# Patient Record
Sex: FEMALE | Race: WHITE | NOT HISPANIC OR LATINO | Employment: FULL TIME | URBAN - METROPOLITAN AREA
[De-identification: names, ages, dates, MRNs, and addresses within clinical notes are randomized per-mention and may not be internally consistent; named-entity substitution may affect disease eponyms.]

---

## 2017-02-10 ENCOUNTER — OFFICE VISIT (OUTPATIENT)
Dept: INTERNAL MEDICINE | Facility: IMAGING CENTER | Age: 47
End: 2017-02-10
Payer: COMMERCIAL

## 2017-02-10 VITALS
BODY MASS INDEX: 34.84 KG/M2 | WEIGHT: 222 LBS | TEMPERATURE: 98.6 F | RESPIRATION RATE: 14 BRPM | HEIGHT: 67 IN | SYSTOLIC BLOOD PRESSURE: 120 MMHG | DIASTOLIC BLOOD PRESSURE: 90 MMHG | HEART RATE: 63 BPM | OXYGEN SATURATION: 95 %

## 2017-02-10 DIAGNOSIS — I10 ESSENTIAL HYPERTENSION: Primary | ICD-10-CM

## 2017-02-10 DIAGNOSIS — E66.9 OBESITY (BMI 30.0-34.9): ICD-10-CM

## 2017-02-10 DIAGNOSIS — E03.9 HYPOTHYROIDISM, UNSPECIFIED TYPE: ICD-10-CM

## 2017-02-10 DIAGNOSIS — F43.0 STRESS REACTION: ICD-10-CM

## 2017-02-10 PROCEDURE — 99214 OFFICE O/P EST MOD 30 MIN: CPT | Performed by: FAMILY MEDICINE

## 2017-02-10 RX ORDER — LEVOTHYROXINE SODIUM 50 MCG
TABLET ORAL
Qty: 180 TAB | Refills: 4 | Status: SHIPPED | OUTPATIENT
Start: 2017-02-10 | End: 2018-03-20 | Stop reason: SDUPTHER

## 2017-02-10 NOTE — MR AVS SNAPSHOT
"        Soraida Fregoso   2/10/2017 8:30 AM   Office Visit   MRN: [id number]    Department:  [facility]   Dept Phone:  [phone]    Description:  Female : 1970   Provider:  Jefry Chavarria M.D.           Reason for Visit     Hypertension           Allergies as of 2/10/2017     Allergen Noted Reactions    Sulfa Drugs 2007   Swelling    Amoxicillin 10/06/2010   Rash      You were diagnosed with     Hypothyroidism, unspecified type   [2297147]       Stress reaction   [211658]         Vital Signs     Blood Pressure Pulse Temperature Respirations Height Weight    120/90 mmHg 63 37 °C (98.6 °F) 14 1.702 m (5' 7.01\") 100.699 kg (222 lb)    Body Mass Index Oxygen Saturation Last Menstrual Period Smoking Status          34.76 kg/m2 95% 10/06/2010 Never Smoker         Basic Information     Date Of Birth Sex Race Ethnicity Preferred Language    1970 Female White Non- English      Your appointments     May 12, 2017 10:00 AM   Established Patient with Jefry Chavarria M.D.   [facility] (--)    [address] [place] [id number]   [phone]           You will be receiving a confirmation call a few days before your appointment from our automated call confirmation system.              Problem List              ICD-10-CM Priority Class Noted - Resolved    Dysthymic disorder F34.1   2012 - Present    Rheumatoid arthritis (CMS-[facility]) M06.9   2012 - Present    Asthma J45.909   2012 - Present    Dysfunction of eustachian tube H69.80   2012 - Present    Premature surgical menopause on HRT E89.40   2012 - Present    Routine general medical examination at a health care facility Z00.00   2012 - Present    Esophagitis K20.9   2012 - Present    Dry eyes H04.123   2012 - Present    GERD (gastroesophageal reflux disease) K21.9   Unknown - Present    Anemia D64.9   Unknown - Present    Polycystic ovaries E28.2   Unknown - Present   " Gluten intolerance K90.0   5/16/2014 - Present    Fatigue R53.83   5/16/2014 - Present    Mixed hyperlipidemia with apolipoprotein E4 variant E78.2   5/16/2014 - Present    Vitamin D deficiency disease E55.9   5/16/2014 - Present    Heterozygous MTHFR mutation Y8969R (CMS-HCC) E72.12   11/7/2014 - Present    Heterozygous MTHFR mutation C677T (CMS-HCC) E72.12   11/7/2014 - Present    Elevated homocysteine (CMS-HCC) E72.11   11/7/2014 - Present    Bladder disorder N32.9   11/7/2014 - Present    Near syncope R55   11/7/2014 - Present    Stress reaction F43.0   8/28/2015 - Present    Hypothyroidism E03.9   8/28/2015 - Present    Other allergic rhinitis J30.89   8/28/2015 - Present    Other specified hypothyroidism E03.8   4/13/2016 - Present    Family history of stroke Z82.3   4/13/2016 - Present    Obesity (BMI 35.0-39.9 without comorbidity) ([facility]) E66.9   11/11/2016 - Present    Essential hypertension I10   11/11/2016 - Present      Health Maintenance        Date Due Completion Dates    IMM PNEUMOCOCCAL 19-64 (ADULT) MEDIUM RISK SERIES (1 of 1 - PPSV23) 2/26/2009 1/1/2009    MAMMOGRAM 9/19/2017 9/19/2015, 3/15/2013 (Done), 7/27/2007, 7/27/2007    Override on 3/15/2013: Done    PAP SMEAR 10/2/2018 10/2/2015, 8/7/2014 (Done), 5/1/2013 (Prv Comp), 11/1/2012 (Done)    Override on 8/7/2014: Done    Override on 5/1/2013: Previously completed    Override on 11/1/2012: Done (Dr. Tracy)    IMM DTaP/Tdap/Td Vaccine (2 - Td) 3/8/2023 3/8/2013            Current Immunizations     13-VALENT PCV PREVNAR 1/1/2009    Influenza TIV (IM) 10/1/2013    Influenza Vaccine Quad Inj (Pf) 10/15/2016    Tdap Vaccine 3/8/2013    Tuberculin Skin Test 11/30/2011  4:31 PM, 12/15/2010, 12/8/2010      Below and/or attached are the medications your provider expects you to take. Review all of your home medications and newly ordered medications with your provider and/or pharmacist. Follow medication instructions as directed by your provider and/or  pharmacist. Please keep your medication list with you and share with your provider. Update the information when medications are discontinued, doses are changed, or new medications (including over-the-counter products) are added; and carry medication information at all times in the event of emergency situations     Allergies:  SULFA DRUGS - Swelling     AMOXICILLIN - Rash               Medications  Valid as of: February 10, 2017 - 10:03 AM    Generic Name Brand Name Tablet Size Instructions for use    Albuterol Sulfate (Aero Soln) albuterol 108 (90 BASE) MCG/ACT Inhale 2 Puffs by mouth every 6 hours as needed for Shortness of Breath.        Beclomethasone Dipropionate (Aero Soln) QVAR 80 MCG/ACT Inhale 1 Puff by mouth every day.        Cholecalciferol (Cap) Vitamin D 2000 UNITS Take 1 Cap by mouth every day.        CycloSPORINE (Emulsion) RESTASIS 0.05 % Place 1 Drop in both eyes 2 times a day.        Escitalopram Oxalate (Tab) LEXAPRO 20 MG TAKE 1 TAB BY MOUTH EVERY DAY.        Estradiol (Solution) Estradiol 1.53 MG/SPRAY Apply 1-3 Sprays to skin as directed every day.        Evening Primrose Oil (Cap) Evening Primrose Oil 500 MG Take 1 Cap by mouth 2 Times a Day.        Fish Oil (Oil) Fish Oil  2 Each by Does not apply route every day. Nature Made Fish Oil [place] 3s EPA and DHA. 227 mg Fish Oil, Omega 3 57 mg, Omega 3 DHA 47.5 mg, Omega 3 EPA 9.5 mg        L-Methylfolate (Tab) L-Methylfolate 1 MG Take 1 Tab by mouth every day. Solgar - Metafolin        Leflunomide (Tab) ARAVA 20 MG Take 1 Tab by mouth every day.        Levalbuterol HCl (Nebu Soln) XOPENEX 0.63 MG/3ML 0.63 mg by Nebulization route every 8 hours as needed. Indications: Spasm of Lung Air Passages        Levothyroxine Sodium (Tab) SYNTHROID 50 MCG TAKE 2 TABS BY MOUTH EVERY DAY. BRAND NAME ONLY!!        Mometasone Furoate (Suspension) NASONEX 50 MCG/ACT Spray 2 Sprays in nose every day. Each nostril        Multiple Vitamin (Tab) THERAGRAN  Take 1 Tab by  mouth every day.        NON SPECIFIED   Take 1 Each by mouth every day. Vitafusion Calcium 500mg Gummy Vitamins        Ramipril (Cap) ALTACE 5 MG Take 1 Cap by mouth every day.        Scopolamine Base (PATCH 72 HR) TRANSDERM-SCOP 1.5 MG/3DAYS Apply 1 Patch to skin as directed every 72 hours.        Testosterone (Gel) TESTIM,ANDROGEL 50 MG/5GM (1%) Apply 5 g to skin as directed every day.        .                 Medicines prescribed today were sent to:     [id number] - [place] - [address] [place] [address]    Phone: [phone] Fax: [phone]    Open 24 Hours?: No      Medication refill instructions:       If your prescription bottle indicates you have medication refills left, it is not necessary to call your provider’s office. Please contact your pharmacy and they will refill your medication.    If your prescription bottle indicates you do not have any refills left, you may request refills at any time through one of the following ways: The online [employer] system (except Urgent Care), by calling your provider’s office, or by asking your pharmacy to contact your provider’s office with a refill request. Medication refills are processed only during regular business hours and may not be available until the next business day. Your provider may request additional information or to have a follow-up visit with you prior to refilling your medication.   *Please Note: Medication refills are assigned a new Rx number when refilled electronically. Your pharmacy may indicate that no refills were authorized even though a new prescription for the same medication is available at the pharmacy. Please request the medicine by name with the pharmacy before contacting your provider for a refill.        Other Notes About Your Plan     Colonoscopy not indicated  Dr. Harris GYN  Rheumatology-[facility]   Pulmonary-[facility] Surgery-[facility]    [facility] insurance - Live[employer] Access Code:  Activation code not generated  Current MyChart Status: Active

## 2017-02-10 NOTE — PROGRESS NOTES
SUBJECTIVE:    Chief Complaint   Patient presents with   • Hypertension       Soraida Fregoso is a 46 y.o. female,   Established Patient     PROBLEM #1-HISTORY OF PRESENT ILLNESS  Existing Problem, but requiring re-evaluation  PATIENT STATEMENT OF PROBLEM - ess. HTN  ONSET - years  COURSE - variable readings. 120/90 today. Asymptomatic.   INTENSITY/STATUS/LOCATION/RADIATION - variable/ on rx  AGGRAVATORS - Multifactorial including inadequate Therapeutic Lifestyle Changes, work stress?  RELIEVERS - meds?   TREATMENTS/COMPLIANCE/@GOAL? - same/ inadequate Therapeutic Lifestyle Changes / no     Allergies   Allergen Reactions   • Sulfa Drugs Swelling   • Amoxicillin Rash       Patient Active Problem List    Diagnosis Date Noted   • Obesity (BMI 35.0-39.9 without comorbidity) ([facility]) 11/11/2016   • Essential hypertension 11/11/2016   • Other specified hypothyroidism 04/13/2016   • Family history of stroke 04/13/2016   • Stress reaction 08/28/2015   • Hypothyroidism 08/28/2015   • Other allergic rhinitis 08/28/2015   • Heterozygous MTHFR mutation V4547A (CMS-HCC) 11/07/2014   • Heterozygous MTHFR mutation C677T (CMS-HCC) 11/07/2014   • Elevated homocysteine (CMS-HCC) 11/07/2014   • Bladder disorder 11/07/2014   • Near syncope 11/07/2014   • Gluten intolerance 05/16/2014   • Fatigue 05/16/2014   • Mixed hyperlipidemia with apolipoprotein E4 variant 05/16/2014   • Vitamin D deficiency disease 05/16/2014   • GERD (gastroesophageal reflux disease)    • Anemia    • Polycystic ovaries    • Dysthymic disorder 11/07/2012   • Rheumatoid arthritis (CMS-HCC) 11/07/2012   • Asthma 11/07/2012   • Dysfunction of eustachian tube 11/07/2012   • Premature surgical menopause on HRT 11/07/2012   • Routine general medical examination at a health care facility 11/07/2012   • Esophagitis 11/07/2012   • Dry eyes 11/07/2012       Outpatient Encounter Prescriptions as of 2/10/2017   Medication Sig Dispense Refill   • SYNTHROID 50 MCG Tab TAKE 2  TABS BY MOUTH EVERY DAY. BRAND NAME ONLY!! 180 Tab 4   • escitalopram (LEXAPRO) 20 MG tablet TAKE 1 TAB BY MOUTH EVERY DAY. 90 Tab 3   • ramipril (ALTACE) 5 MG Cap Take 1 Cap by mouth every day. 90 Cap 4   • Evening Primrose Oil 500 MG Cap Take 1 Cap by mouth 2 Times a Day.     • Estradiol (EVAMIST) 1.53 MG/SPRAY SOLN Apply 1-3 Sprays to skin as directed every day. 1 Bottle 0   • albuterol (VENTOLIN OR PROVENTIL) 108 (90 BASE) MCG/ACT AERS inhalation aerosol Inhale 2 Puffs by mouth every 6 hours as needed for Shortness of Breath. 8.5 g 12   • L-Methylfolate 1 MG TABS Take 1 Tab by mouth every day. Solgar - Metafolin     • leflunomide (ARAVA) 20 MG TABS Take 1 Tab by mouth every day. 90 Tab 3   • testosterone (TESTIM,ANDROGEL) 50 MG/5GM GEL gel Apply 5 g to skin as directed every day.     • Cholecalciferol (VITAMIN D) 2000 UNITS CAPS Take 1 Cap by mouth every day. 30 Cap    • NON SPECIFIED Take 1 Each by mouth every day. Vitafusion Calcium 500mg Gummy Vitamins     • Fish Oil OIL 2 Each by Does not apply route every day. Nature Made Fish Oil [place] 3s EPA and DHA. 227 mg Fish Oil, Omega 3 57 mg, Omega 3 DHA 47.5 mg, Omega 3 EPA 9.5 mg     • levalbuterol (XOPENEX) 0.63 MG/3ML NEBU 0.63 mg by Nebulization route every 8 hours as needed. Indications: Spasm of Lung Air Passages     • multivitamin (THERAGRAN) per tablet Take 1 Tab by mouth every day.     • cyclosporin (RESTASIS) 0.05 % ophthalmic emulsion Place 1 Drop in both eyes 2 times a day. 1 Each 3   • beclomethasone (QVAR) 80 MCG/ACT inhaler Inhale 1 Puff by mouth every day.     • mometasone (NASONEX) 50 MCG/ACT nasal spray Spray 2 Sprays in nose every day. Each nostril 3 Inhaler 4   • scopolamine (TRANSDERM-SCOP) 1.5 MG/3DAYS PATCH 72 HR Apply 1 Patch to skin as directed every 72 hours. 4 Patch 3   • [DISCONTINUED] SYNTHROID 50 MCG Tab TAKE 2 TABS BY MOUTH EVERY DAY. BRAND NAME ONLY!! 180 Tab 4   • [DISCONTINUED] alprazolam (XANAX) 0.25 MG TABS Take 0.5 Tabs by mouth  "3 times a day as needed. 45 Each 1     No facility-administered encounter medications on file as of 2/10/2017.       Social History   Substance Use Topics   • Smoking status: Never Smoker    • Smokeless tobacco: Never Used   • Alcohol Use: No      Comment: twice ayear       Family History   Problem Relation Age of Onset   • Stroke Father 41   • Heart Disease Father      \"hole\" in heart causing stroke   • Alcohol/Drug Brother    • Arthritis Sister      rheumatoid-severe       Patient's Past, Social, and Family History reviewed and updated by me in EPIC today.    REVIEW OF SYMPTOMS:               Pertinent Positives as above.    All other systems reviewed and negative.     OBJECTIVE:    /90 mmHg  Pulse 63  Temp(Src) 37 °C (98.6 °F)  Resp 14  Ht 1.702 m (5' 7.01\")  Wt 100.699 kg (222 lb)  BMI 34.76 kg/m2  SpO2 95%  LMP 10/06/2010  Body mass index is 34.76 kg/(m^2).    Well developed, well nourished female, no acute distress, non-ill appearing. Comfortable, appears stated age, pleasant and cooperative  HEAD: atraumatic, normocephalic   EYES: Conjunctiva normal, EOMI, PERRLA, acuity grossly intact.   EARS/NOSE/THROAT: TM's normal, no SSX of infection, no perforation, no hemotympanum, acuity grossly intact. Oropharynx: benign, no lesions noted. Nares: benign.   NECK: supple, no adenopathy, no thyromegaly or nodules, no JVD, no carotid bruits.   CHEST/LUNGS: clear to auscultation and percussion bilaterally. No adventitious breath sounds.   CARDIOVASCULAR: regular rate and rhythm, no murmur. PMI not displaced. Good central and peripheral pulses.   BACK: no CVA tenderness.   ABDOMEN: soft, non-tender, non-distended, no masses, no hepatosplenomegaly. Normal active bowel tones.   : deferred.   Rectal: deferred.   Extremities: warm/well-perfused, no cyanosis, clubbing, or edema.   SKIN: clear, unbroken, no rashes, normal turgor.   Neuro: Mental Status: Alert and Oriented x 3. CN II-XII grossly intact. Gait " normal. Non-focal, intact. Normal strength, sensation    ASSESSMENT:    1. Essential hypertension     2. Stress reaction     3. Obesity (BMI 30.0-34.9)     4. Hypothyroidism, unspecified type  SYNTHROID 50 MCG Tab       PLAN:    Total Face-to-Face time spent with patient: 30 minutes  Amount of time spent counseling patient and/or coordinating care: 20 minutes    The nature of patient counseling as below:  -Patient Education, including below topics:  -Differential Diagnoses and treatment options discussed  -Risks, benefits, alternatives discussed  -Labs reviewed with patient in detail  -Health Maintenance Exam issues discussed  -Exercise  -Dietary recommendations discussed  -Weight Loss strategies discussed  -Stress Management  -Therapeutic Lifestyle Changes discussed    The nature of coordination of care as below:  -Medications added: Multiple Refills  -Medications discontinued: none. Med list updated.   -Medications adjusted: none  -Continue (other) present chronic medications  -Blood Pressure Diary  -I asked her to bring home BP monitor in to clinic to assess accuracy  -Seek medical attention immediately if worse    FOLLOW-UP:  -in 3 months  -and sooner if test/consult results warrant  And for Health Care Maintenance Exams  And as needed.

## 2017-02-15 NOTE — PATIENT INSTRUCTIONS
Current Outpatient Prescriptions Ordered in [facility]   Medication Sig Dispense Refill   • SYNTHROID 50 MCG Tab TAKE 2 TABS BY MOUTH EVERY DAY. BRAND NAME ONLY!! 180 Tab 4   • escitalopram (LEXAPRO) 20 MG tablet TAKE 1 TAB BY MOUTH EVERY DAY. 90 Tab 3   • ramipril (ALTACE) 5 MG Cap Take 1 Cap by mouth every day. 90 Cap 4   • Evening Primrose Oil 500 MG Cap Take 1 Cap by mouth 2 Times a Day.     • Estradiol (EVAMIST) 1.53 MG/SPRAY SOLN Apply 1-3 Sprays to skin as directed every day. 1 Bottle 0   • albuterol (VENTOLIN OR PROVENTIL) 108 (90 BASE) MCG/ACT AERS inhalation aerosol Inhale 2 Puffs by mouth every 6 hours as needed for Shortness of Breath. 8.5 g 12   • L-Methylfolate 1 MG TABS Take 1 Tab by mouth every day. Solgar - Metafolin     • leflunomide (ARAVA) 20 MG TABS Take 1 Tab by mouth every day. 90 Tab 3   • testosterone (TESTIM,ANDROGEL) 50 MG/5GM GEL gel Apply 5 g to skin as directed every day.     • Cholecalciferol (VITAMIN D) 2000 UNITS CAPS Take 1 Cap by mouth every day. 30 Cap    • NON SPECIFIED Take 1 Each by mouth every day. Vitafusion Calcium 500mg Gummy Vitamins     • Fish Oil OIL 2 Each by Does not apply route every day. Nature Made Fish Oil [place] 3s EPA and DHA. 227 mg Fish Oil, Omega 3 57 mg, Omega 3 DHA 47.5 mg, Omega 3 EPA 9.5 mg     • levalbuterol (XOPENEX) 0.63 MG/3ML NEBU 0.63 mg by Nebulization route every 8 hours as needed. Indications: Spasm of Lung Air Passages     • multivitamin (THERAGRAN) per tablet Take 1 Tab by mouth every day.     • cyclosporin (RESTASIS) 0.05 % ophthalmic emulsion Place 1 Drop in both eyes 2 times a day. 1 Each 3   • beclomethasone (QVAR) 80 MCG/ACT inhaler Inhale 1 Puff by mouth every day.     • mometasone (NASONEX) 50 MCG/ACT nasal spray Spray 2 Sprays in nose every day. Each nostril 3 Inhaler 4   • scopolamine (TRANSDERM-SCOP) 1.5 MG/3DAYS PATCH 72 HR Apply 1 Patch to skin as directed every 72 hours. 4 Patch 3     No current Epic-ordered facility-administered  medications on file.

## 2017-05-12 ENCOUNTER — HOSPITAL ENCOUNTER (OUTPATIENT)
Facility: MEDICAL CENTER | Age: 47
End: 2017-05-12
Attending: FAMILY MEDICINE
Payer: COMMERCIAL

## 2017-05-12 ENCOUNTER — OFFICE VISIT (OUTPATIENT)
Dept: INTERNAL MEDICINE | Facility: IMAGING CENTER | Age: 47
End: 2017-05-12
Payer: COMMERCIAL

## 2017-05-12 VITALS
DIASTOLIC BLOOD PRESSURE: 76 MMHG | WEIGHT: 222 LBS | SYSTOLIC BLOOD PRESSURE: 124 MMHG | RESPIRATION RATE: 14 BRPM | BODY MASS INDEX: 34.84 KG/M2 | OXYGEN SATURATION: 93 % | HEIGHT: 67 IN | TEMPERATURE: 98.8 F | HEART RATE: 78 BPM

## 2017-05-12 DIAGNOSIS — E66.9 OBESITY (BMI 35.0-39.9 WITHOUT COMORBIDITY): ICD-10-CM

## 2017-05-12 DIAGNOSIS — E55.9 VITAMIN D DEFICIENCY DISEASE: ICD-10-CM

## 2017-05-12 DIAGNOSIS — M06.00 RHEUMATOID ARTHRITIS WITH NEGATIVE RHEUMATOID FACTOR, INVOLVING UNSPECIFIED SITE (HCC): ICD-10-CM

## 2017-05-12 DIAGNOSIS — I10 ESSENTIAL HYPERTENSION: ICD-10-CM

## 2017-05-12 DIAGNOSIS — E03.9 HYPOTHYROIDISM, UNSPECIFIED TYPE: ICD-10-CM

## 2017-05-12 DIAGNOSIS — J45.20 MILD INTERMITTENT ASTHMA WITHOUT COMPLICATION: ICD-10-CM

## 2017-05-12 DIAGNOSIS — L60.8 TOENAIL DEFORMITY: ICD-10-CM

## 2017-05-12 LAB — TSH SERPL DL<=0.005 MIU/L-ACNC: 0.41 UIU/ML (ref 0.3–3.7)

## 2017-05-12 PROCEDURE — 84443 ASSAY THYROID STIM HORMONE: CPT

## 2017-05-12 PROCEDURE — 99214 OFFICE O/P EST MOD 30 MIN: CPT | Performed by: FAMILY MEDICINE

## 2017-05-12 NOTE — MR AVS SNAPSHOT
"        Soraida Fregoso   2017 3:00 PM   Office Visit   MRN: [id number]    Department:  [facility]   Dept Phone:  [phone]    Description:  Female : 1970   Provider:  Sandie Cuba M.D.           Reason for Visit     Hypertension discontinued Ramipril 5mg 4 days ago due to side effects      Allergies as of 2017     Allergen Noted Reactions    Sulfa Drugs 2007   Swelling    Amoxicillin 10/06/2010   Rash      You were diagnosed with     Hypothyroidism, unspecified type   [3040212]         Vital Signs     Blood Pressure Pulse Temperature Respirations Height Weight    124/76 mmHg 78 37.1 °C (98.8 °F) 14 1.702 m (5' 7.01\") 100.699 kg (222 lb)    Body Mass Index Oxygen Saturation Last Menstrual Period Smoking Status          34.76 kg/m2 93% 10/06/2010 Never Smoker         Basic Information     Date Of Birth Sex Race Ethnicity Preferred Language    1970 Female White Non- English      Your appointments     Nov 10, 2017  3:00 PM   Established Patient with Sandie Cuba M.D.   [facility] (--)    [address] [place] [id number] [phone]           You will be receiving a confirmation call a few days before your appointment from our automated call confirmation system.              Problem List              ICD-10-CM Priority Class Noted - Resolved    Dysthymic disorder F34.1   2012 - Present    Rheumatoid arthritis (CMS-[facility]) M06.9   2012 - Present    Asthma J45.909   2012 - Present    Dysfunction of eustachian tube H69.80   2012 - Present    Premature surgical menopause on HRT E89.40   2012 - Present    Routine general medical examination at a health care facility Z00.00   2012 - Present    Esophagitis K20.9   2012 - Present    Dry eyes H04.123   2012 - Present    GERD (gastroesophageal reflux disease) K21.9   Unknown - Present    Anemia D64.9   Unknown - Present    Polycystic " ovaries E28.2   Unknown - Present    Gluten intolerance K90.0   5/16/2014 - Present    Fatigue R53.83   5/16/2014 - Present    Mixed hyperlipidemia with apolipoprotein E4 variant E78.2   5/16/2014 - Present    Vitamin D deficiency disease E55.9   5/16/2014 - Present    Heterozygous MTHFR mutation L4600D (CMS-HCC) E72.12   11/7/2014 - Present    Heterozygous MTHFR mutation C677T (CMS-HCC) E72.12   11/7/2014 - Present    Elevated homocysteine (CMS-HCC) E72.11   11/7/2014 - Present    Bladder disorder N32.9   11/7/2014 - Present    Near syncope R55   11/7/2014 - Present    Stress reaction F43.0   8/28/2015 - Present    Hypothyroidism E03.9   8/28/2015 - Present    Other allergic rhinitis J30.89   8/28/2015 - Present    Other specified hypothyroidism E03.8   4/13/2016 - Present    Family history of stroke Z82.3   4/13/2016 - Present    Obesity (BMI 35.0-39.9 without comorbidity) ([facility]) E66.9   11/11/2016 - Present    Essential hypertension I10   11/11/2016 - Present      Health Maintenance        Date Due Completion Dates    IMM PNEUMOCOCCAL 19-64 (ADULT) MEDIUM RISK SERIES (1 of 1 - PPSV23) 2/26/2009 1/1/2009    MAMMOGRAM 9/19/2017 9/19/2015, 3/15/2013 (Done), 7/27/2007, 7/27/2007    Override on 3/15/2013: Done    PAP SMEAR 10/2/2018 10/2/2015, 8/7/2014 (Done), 5/1/2013 (Prv Comp), 11/1/2012 (Done)    Override on 8/7/2014: Done    Override on 5/1/2013: Previously completed    Override on 11/1/2012: Done (Dr. Tracy)    IMM DTaP/Tdap/Td Vaccine (2 - Td) 3/8/2023 3/8/2013            Current Immunizations     13-VALENT PCV PREVNAR 1/1/2009    Influenza TIV (IM) 10/1/2013    Influenza Vaccine Quad Inj (Pf) 10/15/2016    Tdap Vaccine 3/8/2013    Tuberculin Skin Test 11/30/2011  4:31 PM, 12/15/2010, 12/8/2010      Below and/or attached are the medications your provider expects you to take. Review all of your home medications and newly ordered medications with your provider and/or pharmacist. Follow medication instructions as  directed by your provider and/or pharmacist. Please keep your medication list with you and share with your provider. Update the information when medications are discontinued, doses are changed, or new medications (including over-the-counter products) are added; and carry medication information at all times in the event of emergency situations     Allergies:  SULFA DRUGS - Swelling     AMOXICILLIN - Rash               Medications  Valid as of: May 12, 2017 -  4:06 PM    Generic Name Brand Name Tablet Size Instructions for use    Albuterol Sulfate (Aero Soln) albuterol 108 (90 BASE) MCG/ACT Inhale 2 Puffs by mouth every 6 hours as needed for Shortness of Breath.        Beclomethasone Dipropionate (Aero Soln) QVAR 80 MCG/ACT Inhale 1 Puff by mouth every day.        Cholecalciferol (Cap) Vitamin D 2000 UNITS Take 1 Cap by mouth every day.        CycloSPORINE (Emulsion) RESTASIS 0.05 % Place 1 Drop in both eyes 2 times a day.        Escitalopram Oxalate (Tab) LEXAPRO 20 MG TAKE 1 TAB BY MOUTH EVERY DAY.        Estradiol (Solution) Estradiol 1.53 MG/SPRAY Apply 1-3 Sprays to skin as directed every day.        Evening Primrose Oil (Cap) Evening Primrose Oil 500 MG Take 1 Cap by mouth 2 Times a Day.        Fish Oil (Oil) Fish Oil  2 Each by Does not apply route every day. Nature Made Fish Oil [place] 3s EPA and DHA. 227 mg Fish Oil, Omega 3 57 mg, Omega 3 DHA 47.5 mg, Omega 3 EPA 9.5 mg        L-Methylfolate (Tab) L-Methylfolate 1 MG Take 1 Tab by mouth every day. Solgar - Metafolin        Leflunomide (Tab) ARAVA 20 MG Take 1 Tab by mouth every day.        Levalbuterol HCl (Nebu Soln) XOPENEX 0.63 MG/3ML 0.63 mg by Nebulization route every 8 hours as needed. Indications: Spasm of Lung Air Passages        Levothyroxine Sodium (Tab) SYNTHROID 50 MCG TAKE 2 TABS BY MOUTH EVERY DAY. BRAND NAME ONLY!!        Mometasone Furoate (Suspension) NASONEX 50 MCG/ACT Spray 2 Sprays in nose every day. Each nostril        Multiple Vitamin  (Tab) THERAGRAN  Take 1 Tab by mouth every day.        NON SPECIFIED   Take 1 Each by mouth every day. Vitafusion Calcium 500mg Gummy Vitamins        Scopolamine Base (PATCH 72 HR) TRANSDERM-SCOP 1.5 MG/3DAYS Apply 1 Patch to skin as directed every 72 hours.        .                 Medicines prescribed today were sent to:     [facility]/PHARMACY #[id number] - [place] - [address] [place] [address]    Phone: [phone] Fax: [phone]    Open 24 Hours?: No      Medication refill instructions:       If your prescription bottle indicates you have medication refills left, it is not necessary to call your provider’s office. Please contact your pharmacy and they will refill your medication.    If your prescription bottle indicates you do not have any refills left, you may request refills at any time through one of the following ways: The online [employer] system (except Urgent Care), by calling your provider’s office, or by asking your pharmacy to contact your provider’s office with a refill request. Medication refills are processed only during regular business hours and may not be available until the next business day. Your provider may request additional information or to have a follow-up visit with you prior to refilling your medication.   *Please Note: Medication refills are assigned a new Rx number when refilled electronically. Your pharmacy may indicate that no refills were authorized even though a new prescription for the same medication is available at the pharmacy. Please request the medicine by name with the pharmacy before contacting your provider for a refill.        Your To Do List     Future Labs/Procedures Complete By Expires    TSH WITH REFLEX TO FT4  As directed 5/12/2018      Other Notes About Your Plan     Dr. Harris GYN  Rheumatology-Calixto; Pulmonary-Jen; Surgery-Carlos; Opth-Baltazar             [email or web address] Access Code: Activation code not generated  Current [employer] Status: Active

## 2017-05-12 NOTE — PROGRESS NOTES
"Chief Complaint   Patient presents with   • Hypertension     discontinued Ramipril 5mg 4 days ago due to side effects       HPI:  Patient is a 47 y.o. female established patient who presents today to transfer care from Dr. Chavarria to myself and discuss multiple issues. Pt had an extremely stressful and malignant job in the past which had caused many health and stress issues. During that time, she was diagnosed with HTN and placed on ramipril. She has reduced her stress considerably since changing jobs within the last year and has been monitoring her BP at home. She has noticed low BP with feelings of being \"unbalanced\" and incurred headaches. Pt stopped medication four days ago on her own and has not had any symptoms since that time. She has chronic RA followed by Dr. De Luna and had recent labs done which we will request. She has chronic hypothyroidism and is well controlled on Synthroid (brand name) only and is due for her 6 month thyroid panel at our visit. She has chronic obesity and has struggles with weight loss. She has started an exercise program last week and is committed to improving her diet - target weight 170lb. She is also concerned about new b/l great toe toenail growth problems that she would like me to evaluate. She has her yearly gyn appointment with Dr. Harris and UTD with her pap/pelvic exam. She is in good spirits today and reports 100% medication compliance.     Patient Active Problem List    Diagnosis Date Noted   • Obesity (BMI 35.0-39.9 without comorbidity) ([facility]) 11/11/2016   • Essential hypertension 11/11/2016   • Family history of stroke 04/13/2016   • Stress reaction 08/28/2015   • Hypothyroidism 08/28/2015   • Other allergic rhinitis 08/28/2015   • Heterozygous MTHFR mutation U8083P (CMS-HCC) 11/07/2014   • Heterozygous MTHFR mutation C677T (CMS-HCC) 11/07/2014   • Elevated homocysteine (CMS-HCC) 11/07/2014   • Bladder disorder 11/07/2014   • Gluten intolerance 05/16/2014   • Fatigue " "05/16/2014   • Mixed hyperlipidemia with apolipoprotein E4 variant 05/16/2014   • Vitamin D deficiency disease 05/16/2014   • GERD (gastroesophageal reflux disease)    • Anemia    • Polycystic ovaries    • Dysthymic disorder 11/07/2012   • Rheumatoid arthritis with negative rheumatoid factor (CMS-[facility]) 11/07/2012   • Asthma 11/07/2012   • Premature surgical menopause on HRT 11/07/2012   • Routine general medical examination at a health care facility 11/07/2012   • Dry eyes 11/07/2012     Past medical, surgical, family, and social history was reviewed and updated in Epic chart by me today.     Medications and allergies reviewed and updated in Epic chart by me today.     ROS:  Pertinent positives listed above in HPI. All other systems have been reviewed and are negative.    PE:   /76 mmHg  Pulse 78  Temp(Src) 37.1 °C (98.8 °F)  Resp 14  Ht 1.702 m (5' 7.01\")  Wt 100.699 kg (222 lb)  BMI 34.76 kg/m2  SpO2 93%  LMP 10/06/2010  Vital signs reviewed with patient.     Gen: Well developed; well nourished; no acute distress; age appropriate appearance   HEENT: Normocephalic; atraumatic; PEERLA b/l; sclera clear b/l; b/l external auditory canals WNL; b/l TM WNL; oropharynx clear; oral mucosa moist; tongue midline; dentition adequate   Neck: No adenopathy; no thyromegaly  CV: Regular rate and rhythm; S1/ S2 present; no murmur, gallop or rub noted  Pulm: No respiratory distress; clear to ascultation b/l; no wheezing or stridor noted b/l  Abd: Adequate bowel sounds noted; soft and nontender; no rebound, rigidity, nor distention; obese   Extremities: No peripheral edema b/l LE extremities/ no clubbing nor cyanosis noted; b/l pedal pulses present; all toenails have gel polish on them so I cannot visualize natural nail color and texture. Both great toenails have curved stunted growth with significant debris noted underneath - no odor or secondary infection noted; mild inward growth of great toes noted with lateral " calluses.   Skin: Warm and dry; no rashes noted   Neuro: No focal deficits noted   Psych: AAOx4; mood and affect are appropriate    A/P:  1. Chronic hypothyroidism, unspecified type  Stable/ pt due for thyroid panel at visit today. Pt to continue taking daily synthroid.  - TSH WITH REFLEX TO FT4; Future    2. Chronic mild intermittent asthma without complication  Stable/ no recent exacerbation/ well controlled with current medications.     3. Chronic vitamin D deficiency   Stable/ pt to continue taking daily vitamin D3 replacement.     4. Obesity (BMI 35.0-39.9 without comorbidity) ([facility])  Pt has started dedicated exercise program and is focusing on her diet - target weight 170 lbs. Encouragement provided.  - Patient identified as having weight management issue.  Appropriate orders and counseling given.    5. Chronic essential hypertension  Markedly improved with lifestyle changes. Pt remains off ramipril and BP well controlled. Pt will maintain home BP log and contact me if her SBP trends >140.     6. Chronic rheumatoid arthritis with negative rheumatoid factor, involving unspecified site (CMS-[facility])  Stable on current treatment plan with no recent flair. Will request most recent lab result and note from Dr De Luna. Pt to continue yearly f/u exam with Dr. De Luna.     7. Toenail deformity  High suspicion for toenail fungus. Given her RA history and mild great toe deformities in addition to nail issues, will refer to podiatry for formal evaluation and treatment planning.   - REFERRAL TO PODIATRY     I will f/u with pt directly when lab results available. She is to see me in 6 months/ PRN sooner if current condition changes.

## 2017-05-17 PROBLEM — M05.79 RHEUMATOID ARTHRITIS INVOLVING MULTIPLE SITES WITH POSITIVE RHEUMATOID FACTOR (HCC): Chronic | Status: ACTIVE | Noted: 2017-05-17

## 2017-05-19 DIAGNOSIS — J45.30 ASTHMA IN ADULT, MILD PERSISTENT, UNCOMPLICATED: ICD-10-CM

## 2017-05-19 RX ORDER — ALBUTEROL SULFATE 90 UG/1
2 AEROSOL, METERED RESPIRATORY (INHALATION) EVERY 6 HOURS PRN
Qty: 8.5 G | Refills: 12 | Status: SHIPPED | OUTPATIENT
Start: 2017-05-19 | End: 2022-06-28 | Stop reason: SDUPTHER

## 2017-07-14 ENCOUNTER — OFFICE VISIT (OUTPATIENT)
Dept: INTERNAL MEDICINE | Facility: IMAGING CENTER | Age: 47
End: 2017-07-14
Payer: COMMERCIAL

## 2017-07-14 ENCOUNTER — HOSPITAL ENCOUNTER (OUTPATIENT)
Facility: MEDICAL CENTER | Age: 47
End: 2017-07-14
Attending: FAMILY MEDICINE
Payer: COMMERCIAL

## 2017-07-14 VITALS
WEIGHT: 226 LBS | HEIGHT: 67 IN | BODY MASS INDEX: 35.47 KG/M2 | SYSTOLIC BLOOD PRESSURE: 140 MMHG | RESPIRATION RATE: 14 BRPM | TEMPERATURE: 98.4 F | OXYGEN SATURATION: 97 % | DIASTOLIC BLOOD PRESSURE: 90 MMHG | HEART RATE: 65 BPM

## 2017-07-14 DIAGNOSIS — R60.0 EDEMA EXTREMITIES: ICD-10-CM

## 2017-07-14 DIAGNOSIS — E78.2 MIXED HYPERLIPIDEMIA WITH APOLIPOPROTEIN E4 VARIANT: Chronic | ICD-10-CM

## 2017-07-14 DIAGNOSIS — Z00.00 HEALTH CARE MAINTENANCE: ICD-10-CM

## 2017-07-14 DIAGNOSIS — R53.83 FATIGUE, UNSPECIFIED TYPE: ICD-10-CM

## 2017-07-14 DIAGNOSIS — E55.9 VITAMIN D DEFICIENCY DISEASE: ICD-10-CM

## 2017-07-14 DIAGNOSIS — I10 ESSENTIAL HYPERTENSION: Chronic | ICD-10-CM

## 2017-07-14 LAB
25(OH)D3 SERPL-MCNC: 30 NG/ML (ref 30–100)
ALBUMIN SERPL BCP-MCNC: 4.2 G/DL (ref 3.2–4.9)
ALBUMIN/GLOB SERPL: 1.5 G/DL
ALP SERPL-CCNC: 77 U/L (ref 30–99)
ALT SERPL-CCNC: 29 U/L (ref 2–50)
ANION GAP SERPL CALC-SCNC: 6 MMOL/L (ref 0–11.9)
AST SERPL-CCNC: 29 U/L (ref 12–45)
BASOPHILS # BLD AUTO: 0.7 % (ref 0–1.8)
BASOPHILS # BLD: 0.03 K/UL (ref 0–0.12)
BILIRUB SERPL-MCNC: 0.5 MG/DL (ref 0.1–1.5)
BUN SERPL-MCNC: 11 MG/DL (ref 8–22)
CALCIUM SERPL-MCNC: 9.6 MG/DL (ref 8.5–10.5)
CHLORIDE SERPL-SCNC: 107 MMOL/L (ref 96–112)
CHOLEST SERPL-MCNC: 211 MG/DL (ref 100–199)
CO2 SERPL-SCNC: 24 MMOL/L (ref 20–33)
CREAT SERPL-MCNC: 0.76 MG/DL (ref 0.5–1.4)
EOSINOPHIL # BLD AUTO: 0.27 K/UL (ref 0–0.51)
EOSINOPHIL NFR BLD: 5.9 % (ref 0–6.9)
ERYTHROCYTE [DISTWIDTH] IN BLOOD BY AUTOMATED COUNT: 44.6 FL (ref 35.9–50)
GFR SERPL CREATININE-BSD FRML MDRD: >60 ML/MIN/1.73 M 2
GLOBULIN SER CALC-MCNC: 2.8 G/DL (ref 1.9–3.5)
GLUCOSE SERPL-MCNC: 93 MG/DL (ref 65–99)
HCT VFR BLD AUTO: 51.3 % (ref 37–47)
HDLC SERPL-MCNC: 64 MG/DL
HGB BLD-MCNC: 16.2 G/DL (ref 12–16)
IMM GRANULOCYTES # BLD AUTO: 0.01 K/UL (ref 0–0.11)
IMM GRANULOCYTES NFR BLD AUTO: 0.2 % (ref 0–0.9)
LDLC SERPL CALC-MCNC: 122 MG/DL
LYMPHOCYTES # BLD AUTO: 1.5 K/UL (ref 1–4.8)
LYMPHOCYTES NFR BLD: 32.6 % (ref 22–41)
MAGNESIUM SERPL-MCNC: 2 MG/DL (ref 1.5–2.5)
MCH RBC QN AUTO: 29.2 PG (ref 27–33)
MCHC RBC AUTO-ENTMCNC: 31.6 G/DL (ref 33.6–35)
MCV RBC AUTO: 92.6 FL (ref 81.4–97.8)
MONOCYTES # BLD AUTO: 0.36 K/UL (ref 0–0.85)
MONOCYTES NFR BLD AUTO: 7.8 % (ref 0–13.4)
NEUTROPHILS # BLD AUTO: 2.43 K/UL (ref 2–7.15)
NEUTROPHILS NFR BLD: 52.8 % (ref 44–72)
NRBC # BLD AUTO: 0 K/UL
NRBC BLD AUTO-RTO: 0 /100 WBC
PLATELET # BLD AUTO: 199 K/UL (ref 164–446)
PMV BLD AUTO: 11.9 FL (ref 9–12.9)
POTASSIUM SERPL-SCNC: 3.9 MMOL/L (ref 3.6–5.5)
PROT SERPL-MCNC: 7 G/DL (ref 6–8.2)
RBC # BLD AUTO: 5.54 M/UL (ref 4.2–5.4)
SODIUM SERPL-SCNC: 137 MMOL/L (ref 135–145)
TRIGL SERPL-MCNC: 123 MG/DL (ref 0–149)
VIT B12 SERPL-MCNC: 554 PG/ML (ref 211–911)
WBC # BLD AUTO: 4.6 K/UL (ref 4.8–10.8)

## 2017-07-14 PROCEDURE — 82607 VITAMIN B-12: CPT

## 2017-07-14 PROCEDURE — 83735 ASSAY OF MAGNESIUM: CPT

## 2017-07-14 PROCEDURE — 82306 VITAMIN D 25 HYDROXY: CPT

## 2017-07-14 PROCEDURE — 99214 OFFICE O/P EST MOD 30 MIN: CPT | Performed by: FAMILY MEDICINE

## 2017-07-14 PROCEDURE — 80053 COMPREHEN METABOLIC PANEL: CPT

## 2017-07-14 PROCEDURE — 85025 COMPLETE CBC W/AUTO DIFF WBC: CPT

## 2017-07-14 PROCEDURE — 80061 LIPID PANEL: CPT

## 2017-07-14 RX ORDER — FUROSEMIDE 20 MG/1
20 TABLET ORAL DAILY
Qty: 5 TAB | Refills: 0 | Status: SHIPPED | OUTPATIENT
Start: 2017-07-14 | End: 2017-08-18 | Stop reason: SDUPTHER

## 2017-07-14 RX ORDER — POTASSIUM CHLORIDE 20 MEQ/1
20 TABLET, EXTENDED RELEASE ORAL DAILY
Qty: 5 TAB | Refills: 0 | Status: SHIPPED | OUTPATIENT
Start: 2017-07-14 | End: 2017-08-18 | Stop reason: SDUPTHER

## 2017-07-14 NOTE — MR AVS SNAPSHOT
"        Soraida Fregoso   2017 11:00 AM   Office Visit   MRN: [id number]    Department:  [facility]   Dept Phone:  [phone]    Description:  Female : 1970   Provider:  Sandie Cuba M.D.           Reason for Visit     Other burning sensation bilaterally below knees x 10 days    Foot Swelling bilaterally L>R      Allergies as of 2017     Allergen Noted Reactions    Sulfa Drugs 2007   Swelling    Amoxicillin 10/06/2010   Rash      You were diagnosed with     Edema extremities   [[id number]]       Health care maintenance   [858868]       Fatigue, unspecified type   [4850464]       Essential hypertension   [9467914]         Vital Signs     Blood Pressure Pulse Temperature Respirations Height Weight    140/90 mmHg 65 36.9 °C (98.4 °F) 14 1.702 m (5' 7.01\") 102.513 kg (226 lb)    Body Mass Index Oxygen Saturation Last Menstrual Period Smoking Status          35.39 kg/m2 97% 10/06/2010 Never Smoker         Basic Information     Date Of Birth Sex Race Ethnicity Preferred Language    1970 Female White Non- English      Your appointments     Nov 10, 2017  3:00 PM   Established Patient with Sandie Cuba M.D.   [facility] (--)    [address] [facility] [address] [facility] [id number] [phone]           You will be receiving a confirmation call a few days before your appointment from our automated call confirmation system.              Problem List              ICD-10-CM Priority Class Noted - Resolved    Dysthymic disorder F34.1   2012 - Present    Asthma J45.909   2012 - Present    Premature surgical menopause on HRT E89.40   2012 - Present    Routine general medical examination at a health care facility Z00.00   2012 - Present    Dry eyes H04.123   2012 - Present    GERD (gastroesophageal reflux disease) K21.9   Unknown - Present    Polycystic ovaries (Chronic) E28.2   Unknown - Present    Gluten intolerance K90.0   " 5/16/2014 - Present    Mixed hyperlipidemia with apolipoprotein E4 variant E78.2   5/16/2014 - Present    Vitamin D deficiency disease E55.9   5/16/2014 - Present    Heterozygous MTHFR mutation V9557X (CMS-HCC) E72.12   11/7/2014 - Present    Heterozygous MTHFR mutation C677T (CMS-HCC) E72.12   11/7/2014 - Present    Elevated homocysteine (CMS-HCC) E72.11   11/7/2014 - Present    Bladder disorder N32.9   11/7/2014 - Present    Stress reaction F43.0   8/28/2015 - Present    Hypothyroidism (Chronic) E03.9   8/28/2015 - Present    Other allergic rhinitis J30.89   8/28/2015 - Present    Family history of stroke Z82.3   4/13/2016 - Present    Obesity (BMI 35.0-39.9 without comorbidity) (HCC) (Chronic) E66.9   11/11/2016 - Present    Essential hypertension (Chronic) I10   11/11/2016 - Present    Rheumatoid arthritis involving multiple sites with positive rheumatoid factor (CMS-HCC) (Chronic) M05.79   5/17/2017 - Present      Health Maintenance        Date Due Completion Dates    IMM PNEUMOCOCCAL 19-64 (ADULT) MEDIUM RISK SERIES (1 of 1 - PPSV23) 5/1/2018 (Originally 2/26/2009) 1/1/2009    IMM INFLUENZA (1) 9/1/2017 10/15/2016, 10/1/2013    MAMMOGRAM 9/19/2017 9/19/2015, 3/15/2013 (Done), 7/27/2007, 7/27/2007    Override on 3/15/2013: Done    PAP SMEAR 10/2/2017 10/2/2015, 10/2/2015, 8/7/2014 (Done), 5/1/2013 (Prv Comp), 11/1/2012 (Done)    Override on 8/7/2014: Done    Override on 5/1/2013: Previously completed    Override on 11/1/2012: Done (Dr. Tracy)    IMM DTaP/Tdap/Td Vaccine (2 - Td) 3/8/2023 3/8/2013            Current Immunizations     13-VALENT PCV PREVNAR 1/1/2009    Influenza TIV (IM) 10/1/2013    Influenza Vaccine Quad Inj (Pf) 10/15/2016    Tdap Vaccine 3/8/2013    Tuberculin Skin Test 11/30/2011  4:31 PM, 12/15/2010, 12/8/2010      Below and/or attached are the medications your provider expects you to take. Review all of your home medications and newly ordered medications with your provider and/or  pharmacist. Follow medication instructions as directed by your provider and/or pharmacist. Please keep your medication list with you and share with your provider. Update the information when medications are discontinued, doses are changed, or new medications (including over-the-counter products) are added; and carry medication information at all times in the event of emergency situations     Allergies:  SULFA DRUGS - Swelling     AMOXICILLIN - Rash               Medications  Valid as of: July 14, 2017 -  1:08 PM    Generic Name Brand Name Tablet Size Instructions for use    Albuterol Sulfate (Aero Soln) albuterol 108 (90 BASE) MCG/ACT Inhale 2 Puffs by mouth every 6 hours as needed for Shortness of Breath.        Beclomethasone Dipropionate (Aero Soln) QVAR 80 MCG/ACT Inhale 1 Puff by mouth every day.        Cholecalciferol (Cap) Vitamin D 2000 UNITS Take 1 Cap by mouth every day.        CycloSPORINE (Emulsion) RESTASIS 0.05 % Place 1 Drop in both eyes 2 times a day.        Escitalopram Oxalate (Tab) LEXAPRO 20 MG TAKE 1 TAB BY MOUTH EVERY DAY.        Estradiol (Solution) Estradiol 1.53 MG/SPRAY Apply 1-3 Sprays to skin as directed every day.        Evening Primrose Oil (Cap) Evening Primrose Oil 500 MG Take 1 Cap by mouth 2 Times a Day.        Fish Oil (Oil) Fish Oil  2 Each by Does not apply route every day. Nature Made Fish Oil [place] 3s EPA and DHA. 227 mg Fish Oil, Omega 3 57 mg, Omega 3 DHA 47.5 mg, Omega 3 EPA 9.5 mg        Furosemide (Tab) LASIX 20 MG Take 1 Tab by mouth every day.        L-Methylfolate (Tab) L-Methylfolate 1 MG Take 1 Tab by mouth every day. Solgar - Metafolin        Leflunomide (Tab) ARAVA 20 MG Take 1 Tab by mouth every day.        Levalbuterol HCl (Nebu Soln) XOPENEX 0.63 MG/3ML 0.63 mg by Nebulization route every 8 hours as needed. Indications: Spasm of Lung Air Passages        Levothyroxine Sodium (Tab) SYNTHROID 50 MCG TAKE 2 TABS BY MOUTH EVERY DAY. BRAND NAME ONLY!!        Mometasone  Furoate (Suspension) NASONEX 50 MCG/ACT Spray 2 Sprays in nose every day. Each nostril        Multiple Vitamin (Tab) THERAGRAN  Take 1 Tab by mouth every day.        NON SPECIFIED   Take 1 Each by mouth every day. Vitafusion Calcium 500mg Gummy Vitamins        Potassium Chloride Omayra CR (Tab CR) Kdur 20 MEQ Take 1 Tab by mouth every day. (Take tablet each morning with lasix tablet)        Scopolamine Base (PATCH 72 HR) TRANSDERM-SCOP 1.5 MG/3DAYS Apply 1 Patch to skin as directed every 72 hours.        Testosterone   Apply  to skin as directed. Indications: compounded cream - 1/4 tsp applied to thigh daily        .                 Medicines prescribed today were sent to:     [facility]/PHARMACY #[id number] - [place] - [address]    [address] [place] [address]    Phone: [phone] Fax: [phone]    Open 24 Hours?: No      Medication refill instructions:       If your prescription bottle indicates you have medication refills left, it is not necessary to call your provider’s office. Please contact your pharmacy and they will refill your medication.    If your prescription bottle indicates you do not have any refills left, you may request refills at any time through one of the following ways: The online [employer] system (except Urgent Care), by calling your provider’s office, or by asking your pharmacy to contact your provider’s office with a refill request. Medication refills are processed only during regular business hours and may not be available until the next business day. Your provider may request additional information or to have a follow-up visit with you prior to refilling your medication.   *Please Note: Medication refills are assigned a new Rx number when refilled electronically. Your pharmacy may indicate that no refills were authorized even though a new prescription for the same medication is available at the pharmacy. Please request the medicine by name with the pharmacy before contacting your provider for a  refill.        Your To Do List     Future Labs/Procedures Complete By Expires    CBC WITH DIFFERENTIAL  As directed 7/14/2018    COMP METABOLIC PANEL  As directed 7/14/2018    LIPID PROFILE  As directed 7/14/2018    MAGNESIUM  As directed 7/14/2018    VITAMIN B12  As directed 7/14/2018    VITAMIN D,25 HYDROXY  As directed 7/14/2018      Other Notes About Your Plan     GYN: Dr. Harris  Rheumatology: Dr. De Luna  Pulmonary: Jen  GS: Dr. Henrry Gonzalez  Opth: Dr. Baltazar Camp Access Code: Activation code not generated  Current [employer] Status: Active

## 2017-07-14 NOTE — PROGRESS NOTES
Chief Complaint   Patient presents with   • Other     burning sensation bilaterally below knees x 10 days   • Foot Swelling     bilaterally L>R       HPI:  Patient is a 47 y.o. female established patient who presents today for evaluation of new ten day history of b/l foot and mainly ankle swelling, intermittent burning sensation and zinging pain for the past ten days. During this time frame, pt went camping with family and denies any changes with activity/no additional exertion/ no bug bites on lower legs/ no air travel/ no medication changes/ no diet changes. Pt cannot correlate complaints directly to rest vs activity or morning vs. Night and states that today her symptoms are much improved - residual L ankle swelling noted. Pt states that her leg skin is always red and does not notice any changes with this recently. She denies associated systemic complaints. She also complains of non - specific fatigue that is ongoing and hard to characterize for her. She has chronic HTN and reports controlled home BP readings - she is aware that her BP is elevated today. She is due for fasting labs and would like results sent to Dr. De Luna for his records.     Patient Active Problem List    Diagnosis Date Noted   • Rheumatoid arthritis involving multiple sites with positive rheumatoid factor (CMS-HCC) 05/17/2017   • Obesity (BMI 35.0-39.9 without comorbidity) ([facility]) 11/11/2016   • Essential hypertension 11/11/2016   • Family history of stroke 04/13/2016   • Stress reaction 08/28/2015   • Hypothyroidism 08/28/2015   • Other allergic rhinitis 08/28/2015   • Heterozygous MTHFR mutation Q8146E (CMS-HCC) 11/07/2014   • Heterozygous MTHFR mutation C677T (CMS-HCC) 11/07/2014   • Elevated homocysteine (CMS-HCC) 11/07/2014   • Bladder disorder 11/07/2014   • Gluten intolerance 05/16/2014   • Mixed hyperlipidemia with apolipoprotein E4 variant 05/16/2014   • Vitamin D deficiency disease 05/16/2014   • GERD (gastroesophageal reflux disease)   "  • Polycystic ovaries    • Dysthymic disorder 11/07/2012   • Asthma 11/07/2012   • Premature surgical menopause on HRT 11/07/2012   • Dry eyes 11/07/2012     Past medical, surgical, family, and social history was reviewed and updated in Epic chart by me today.     Medications and allergies reviewed and updated in Epic chart by me today.     ROS:  Pertinent positives listed above in HPI. All other systems have been reviewed and are negative.    PE:   /90 mmHg  Pulse 65  Temp(Src) 36.9 °C (98.4 °F)  Resp 14  Ht 1.702 m (5' 7.01\")  Wt 102.513 kg (226 lb)  BMI 35.39 kg/m2  SpO2 97%  LMP 10/06/2010  Vital signs reviewed with patient.     Gen: Well developed; well nourished; no acute distress; age appropriate appearance   CV: Regular rate and rhythm; S1/ S2 present; no murmur, gallop or rub noted  Pulm: No respiratory distress; clear to ascultation b/l; no wheezing or stridor noted b/l  Abd: Adequate bowel sounds noted; soft and nontender; no rebound, rigidity, nor distention  Extremities: No peripheral edema R LE extremities/ L ankle 2+ global edema noted/ no clubbing nor cyanosis noted; strong b/l pedal pulses noted/ no calf pain b/l with palpation/ no gross deformities noted; no open lesions or sores  Skin: Warm and dry; no rashes noted; b/l LE skin is red but no signs of infection (same hue as her arms) and no increased temp to touch/ no bug bites noted  Neuro: No focal deficits noted   Psych: AAOx4; mood and affect are appropriate    A/P:  1. Edema extremities  No signs of heart failure noted - will try diuresis trial with lasix and KCl supplementation to see if this is the root cause of her concurrent pain complaints. Will do imaging/ consider causes of neuropathy if not effective.   - furosemide (LASIX) 20 MG Tab; Take 1 Tab by mouth every day.  Dispense: 5 Tab; Refill: 0  - potassium chloride SA (KDUR) 20 MEQ Tab CR; Take 1 Tab by mouth every day. (Take tablet each morning with lasix tablet)  " Dispense: 5 Tab; Refill: 0  - CBC WITH DIFFERENTIAL; Future  - COMP METABOLIC PANEL; Future    2. Health care maintenance  Due for fasting labs  - CBC WITH DIFFERENTIAL; Future    3. Fatigue, unspecified type  Complaint is very non-specific - will check B12 and Mg levels with labs today.   - VITAMIN B12; Future  - MAGNESIUM; Future    4. Essential hypertension  Currently not on BP control medication/ BP mildly elevated today - hopeful that lasix will help with BP control. Pt is to continue home monitoring.     5. Mixed hyperlipidemia with apolipoprotein E4 variant  Due for fasting labs today.  - LIPID PROFILE; Future    6. Vitamin D deficiency disease   - VITAMIN D,25 HYDROXY; Future     I will follow-up with patient regarding lab results when available, and she will let me know if above treatment plan is effective on Monday/ Tuesday/ PRN sooner if condition worsens.

## 2017-07-19 ENCOUNTER — TELEPHONE (OUTPATIENT)
Dept: INTERNAL MEDICINE | Facility: IMAGING CENTER | Age: 47
End: 2017-07-19

## 2017-08-18 ENCOUNTER — OFFICE VISIT (OUTPATIENT)
Dept: INTERNAL MEDICINE | Facility: IMAGING CENTER | Age: 47
End: 2017-08-18
Payer: COMMERCIAL

## 2017-08-18 VITALS
TEMPERATURE: 98.2 F | HEIGHT: 67 IN | OXYGEN SATURATION: 95 % | HEART RATE: 83 BPM | WEIGHT: 225 LBS | BODY MASS INDEX: 35.31 KG/M2 | RESPIRATION RATE: 14 BRPM | SYSTOLIC BLOOD PRESSURE: 120 MMHG | DIASTOLIC BLOOD PRESSURE: 90 MMHG

## 2017-08-18 DIAGNOSIS — E66.9 OBESITY (BMI 35.0-39.9 WITHOUT COMORBIDITY): Chronic | ICD-10-CM

## 2017-08-18 DIAGNOSIS — I10 ESSENTIAL HYPERTENSION: Chronic | ICD-10-CM

## 2017-08-18 DIAGNOSIS — R60.0 BILATERAL LOWER EXTREMITY EDEMA: ICD-10-CM

## 2017-08-18 PROCEDURE — 99214 OFFICE O/P EST MOD 30 MIN: CPT | Performed by: FAMILY MEDICINE

## 2017-08-18 RX ORDER — POTASSIUM CHLORIDE 20 MEQ/1
20 TABLET, EXTENDED RELEASE ORAL DAILY
Qty: 30 TAB | Refills: 1 | Status: SHIPPED | OUTPATIENT
Start: 2017-08-18 | End: 2017-10-11 | Stop reason: SDUPTHER

## 2017-08-18 RX ORDER — FUROSEMIDE 20 MG/1
20 TABLET ORAL DAILY
Qty: 30 TAB | Refills: 1 | Status: SHIPPED | OUTPATIENT
Start: 2017-08-18 | End: 2017-10-11 | Stop reason: SDUPTHER

## 2017-08-18 NOTE — PROGRESS NOTES
Chief Complaint   Patient presents with   • Hypertension     HPI:  Patient is a 47 y.o. female established patient who presents today to discuss many issues. She has chronic essential HTN and has been sporadically taking her BP at home averaging 129-165/ with higher number predominant with record review today. She is currently not taking a daily BP control medication and had some brief BP control when taking 5 day course of lasix/KCl for lower extremity edema. Her edema did significantly improve during this treatment period, and she feels like her edema is slowly coming back at ankle level, specifically medial malleoli area. She is having her b/l great toe nails removed by Dr. Medina on 9/8/17 and has detailed this treatment process for me today. She is also concerned about her chronic obesity despite having lap band surgery in 2006 - she does work out multiple times per week and eats 6 small meals a day - is not losing weight despite these measures. She also had vaginal biofeedback done by Dr. Harris in the past and she is feeling some intermittent fullness at times, more in her rectal area than in her bladder, but denies overt problems with bowel movements or urination. She also has had momentary episodes of pinpoint muscle pain in her chest and sternum area with reaching or stretching that are new to her. She reports that her other chronic medical issues are relatively stable and has 100% medication compliance.    Patient Active Problem List    Diagnosis Date Noted   • Rheumatoid arthritis involving multiple sites with positive rheumatoid factor (CMS-HCC) 05/17/2017   • Obesity (BMI 35.0-39.9 without comorbidity) ([facility]) 11/11/2016   • Essential hypertension 11/11/2016   • Family history of stroke 04/13/2016   • Hypothyroidism 08/28/2015   • Heterozygous MTHFR mutation Q4008A (CMS-HCC) 11/07/2014   • Heterozygous MTHFR mutation C677T (CMS-HCC) 11/07/2014   • Elevated homocysteine (CMS-HCC) 11/07/2014   •  "Gluten intolerance 05/16/2014   • Mixed hyperlipidemia with apolipoprotein E4 variant 05/16/2014   • Vitamin D deficiency disease 05/16/2014   • GERD (gastroesophageal reflux disease)    • Polycystic ovaries    • Dysthymic disorder 11/07/2012   • Asthma 11/07/2012   • Premature surgical menopause on HRT 11/07/2012   • Dry eyes 11/07/2012     Past medical, surgical, family, and social history was reviewed and updated in Epic chart by me today.     Medications and allergies reviewed and updated in Epic chart by me today.     ROS:  Pertinent positives listed above in HPI. All other systems have been reviewed and are negative.    PE:   /90 mmHg  Pulse 83  Temp(Src) 36.8 °C (98.2 °F)  Resp 14  Ht 1.702 m (5' 7.01\")  Wt 102.059 kg (225 lb)  BMI 35.23 kg/m2  SpO2 95%  LMP 10/06/2010  Vital signs reviewed with patient.     Gen: Well developed; well nourished; no acute distress; age appropriate appearance   Chest: I cannot reproduce her pain symptoms with palpation of anterior wall/ sternum; cannot reproduce symptoms with passive ROM of each arm over head and lateral movement b/l  CV: Regular rate and rhythm; S1/ S2 present; no murmur, gallop or rub noted  Pulm: No respiratory distress; clear to ascultation b/l; no wheezing or stridor noted b/l  Abd: Adequate bowel sounds noted; soft and nontender; no rebound, rigidity, nor distention  Extremities: mild peripheral edema noted at medial malleoli b/l LE extremities/ no clubbing nor cyanosis noted  Skin: Warm and dry; no rashes noted; her skin tone is red at baseline   Neuro: No focal deficits noted   Psych: AAOx4; mood and affect are appropriate    A/P:  1. Chronic essential hypertension  Uncontrolled/ will have patient start daily lasix/KCl both for BP control and edema control - will follow response.   - furosemide (LASIX) 20 MG Tab; Take 1 Tab by mouth every day.  Dispense: 30 Tab; Refill: 1  - potassium chloride SA (KDUR) 20 MEQ Tab CR; Take 1 Tab by mouth " every day. (Take tablet each morning with lasix tablet)  Dispense: 30 Tab; Refill: 1    2. Bilateral lower extremity edema  Markedly improved with prior 5 day use of lasix/KCl combo; will restart lasix and KCl daily to control edema accumulation.  - furosemide (LASIX) 20 MG Tab; Take 1 Tab by mouth every day.  Dispense: 30 Tab; Refill: 1  - potassium chloride SA (KDUR) 20 MEQ Tab CR; Take 1 Tab by mouth every day. (Take tablet each morning with lasix tablet)  Dispense: 30 Tab; Refill: 1     3. Chronic obesity (BMI 35)  Stable/ pt is frustrated with this topic overall and discussed diet strategies at length for her to research (weight watchers/ carb cycling in particular) that may interest her and have proven, safe results.     Pt is to return in approximately one month for medication reassessment/ PRN sooner if current condition changes. Pt is to contact me if she experiences more frequent pains in her anterior chest area - likely musculoskeletal in origin and any bowel or bladder complaints.

## 2017-08-18 NOTE — MR AVS SNAPSHOT
"        Soraida Fregoso   2017 9:00 AM   Office Visit   MRN: [id number]    [facility]   Dept Phone:  [phone]    Description:  Female : 1970   Provider:  Sandie Cuba M.D.           Reason for Visit     Hypertension           Allergies as of 2017     Allergen Noted Reactions    Sulfa Drugs 2007   Swelling    Amoxicillin 10/06/2010   Rash      You were diagnosed with     Essential hypertension   [5957275]       Bilateral lower extremity edema   [396860]       Edema extremities   [858723]         Vital Signs     Blood Pressure Pulse Temperature Respirations Height Weight    120/90 mmHg 83 36.8 °C (98.2 °F) 14 1.702 m (5' 7.01\") 102.059 kg (225 lb)    Body Mass Index Oxygen Saturation Last Menstrual Period Smoking Status          35.23 kg/m2 95% 10/06/2010 Never Smoker         Basic Information     Date Of Birth Sex Race Ethnicity Preferred Language    1970 Female White Non- English      Your appointments     Sep 22, 2017  9:00 AM   Established Patient with Sandie Cuba M.D.   [facility] (--)    [address]  [place] [id number] [phone]           You will be receiving a confirmation call a few days before your appointment from our automated call confirmation system.            Nov 10, 2017  3:00 PM   Established Patient with Sandie Cuba M.D.   [facility] (--)    [address]  [place] [id number] [phone]           You will be receiving a confirmation call a few days before your appointment from our automated call confirmation system.              Problem List              ICD-10-CM Priority Class Noted - Resolved    Dysthymic disorder (Chronic) F34.1   2012 - Present    Asthma (Chronic) J45.909   2012 - Present    Premature surgical menopause on HRT (Chronic) E89.40   2012 - Present    Dry eyes (Chronic) H04.123   2012 - Present "    GERD (gastroesophageal reflux disease) K21.9   Unknown - Present    Polycystic ovaries (Chronic) E28.2   Unknown - Present    Gluten intolerance K90.0   5/16/2014 - Present    Mixed hyperlipidemia with apolipoprotein E4 variant (Chronic) E78.2   5/16/2014 - Present    Vitamin D deficiency disease E55.9   5/16/2014 - Present    Heterozygous MTHFR mutation Q7546L (CMS-HCC) E72.12   11/7/2014 - Present    Heterozygous MTHFR mutation C677T (CMS-HCC) E72.12   11/7/2014 - Present    Elevated homocysteine (CMS-HCC) E72.11   11/7/2014 - Present    Bladder disorder N32.9   11/7/2014 - Present    Stress reaction F43.0   8/28/2015 - Present    Hypothyroidism (Chronic) E03.9   8/28/2015 - Present    Other allergic rhinitis J30.89   8/28/2015 - Present    Family history of stroke Z82.3   4/13/2016 - Present    Obesity (BMI 35.0-39.9 without comorbidity) (HCC) (Chronic) E66.9   11/11/2016 - Present    Essential hypertension (Chronic) I10   11/11/2016 - Present    Rheumatoid arthritis involving multiple sites with positive rheumatoid factor (CMS-HCC) (Chronic) M05.79   5/17/2017 - Present      Health Maintenance        Date Due Completion Dates    IMM PNEUMOCOCCAL 19-64 (ADULT) MEDIUM RISK SERIES (1 of 1 - PPSV23) 5/1/2018 (Originally 2/26/2009) 1/1/2009    IMM INFLUENZA (1) 9/1/2017 10/15/2016, 10/1/2013    MAMMOGRAM 9/19/2017 9/19/2015, 3/15/2013 (Done), 7/27/2007, 7/27/2007    Override on 3/15/2013: Done    PAP SMEAR 10/2/2017 10/2/2015, 10/2/2015, 8/7/2014 (Done), 5/1/2013 (Prv Comp), 11/1/2012 (Done)    Override on 8/7/2014: Done    Override on 5/1/2013: Previously completed    Override on 11/1/2012: Done (Dr. Tracy)    IMM DTaP/Tdap/Td Vaccine (2 - Td) 3/8/2023 3/8/2013            Current Immunizations     13-VALENT PCV PREVNAR 1/1/2009    Influenza TIV (IM) 10/1/2013    Influenza Vaccine Quad Inj (Pf) 10/15/2016    Tdap Vaccine 3/8/2013    Tuberculin Skin Test 11/30/2011  4:31 PM, 12/15/2010, 12/8/2010      Below and/or  attached are the medications your provider expects you to take. Review all of your home medications and newly ordered medications with your provider and/or pharmacist. Follow medication instructions as directed by your provider and/or pharmacist. Please keep your medication list with you and share with your provider. Update the information when medications are discontinued, doses are changed, or new medications (including over-the-counter products) are added; and carry medication information at all times in the event of emergency situations     Allergies:  SULFA DRUGS - Swelling     AMOXICILLIN - Rash               Medications  Valid as of: August 18, 2017 -  1:07 PM    Generic Name Brand Name Tablet Size Instructions for use    Albuterol Sulfate (Aero Soln) albuterol 108 (90 Base) MCG/ACT Inhale 2 Puffs by mouth every 6 hours as needed for Shortness of Breath.        Beclomethasone Dipropionate (Aero Soln) QVAR 80 MCG/ACT Inhale 1 Puff by mouth every day.        Cholecalciferol (Cap) Vitamin D 2000 units Take 1 Cap by mouth every day.        CycloSPORINE (Emulsion) RESTASIS 0.05 % Place 1 Drop in both eyes 2 times a day.        Escitalopram Oxalate (Tab) LEXAPRO 20 MG TAKE 1 TAB BY MOUTH EVERY DAY.        Estradiol (Solution) Estradiol 1.53 MG/SPRAY Apply 1-3 Sprays to skin as directed every day.        Evening Primrose Oil (Cap) Evening Primrose Oil 500 MG Take 1 Cap by mouth 2 Times a Day.        Fish Oil (Oil) Fish Oil  2 Each by Does not apply route every day. Nature Made Fish Oil Arrey 3s EPA and DHA. 227 mg Fish Oil, Omega 3 57 mg, Omega 3 DHA 47.5 mg, Omega 3 EPA 9.5 mg        Furosemide (Tab) LASIX 20 MG Take 1 Tab by mouth every day.        L-Methylfolate (Tab) L-Methylfolate 1 MG Take 1 Tab by mouth every day. Solgar - Metafolin        Leflunomide (Tab) ARAVA 20 MG Take 1 Tab by mouth every day.        Levalbuterol HCl (Nebu Soln) XOPENEX 0.63 MG/3ML 0.63 mg by Nebulization route every 8 hours as needed.  Indications: Spasm of Lung Air Passages        Levothyroxine Sodium (Tab) SYNTHROID 50 MCG TAKE 2 TABS BY MOUTH EVERY DAY. BRAND NAME ONLY!!        Mometasone Furoate (Suspension) NASONEX 50 MCG/ACT Spray 2 Sprays in nose every day. Each nostril        Multiple Vitamin (Tab) THERAGRAN  Take 1 Tab by mouth every day.        NON SPECIFIED   Take 1 Each by mouth every day. Vitafusion Calcium 500mg Gummy Vitamins        Potassium Chloride Omayra CR (Tab CR) Kdur 20 MEQ Take 1 Tab by mouth every day. (Take tablet each morning with lasix tablet)        Scopolamine Base (PATCH 72 HR) TRANSDERM-SCOP 1 MG/3DAYS Apply 1 Patch to skin as directed every 72 hours.        Testosterone   Apply  to skin as directed. Indications: compounded cream - 1/4 tsp applied to thigh daily        .                 Medicines prescribed today were sent to:     [id number] - [place] - [address] [place] [address]    Phone: [phone] Fax: [phone]    Open 24 Hours?: No      Medication refill instructions:       If your prescription bottle indicates you have medication refills left, it is not necessary to call your provider’s office. Please contact your pharmacy and they will refill your medication.    If your prescription bottle indicates you do not have any refills left, you may request refills at any time through one of the following ways: The online [employer] system (except Urgent Care), by calling your provider’s office, or by asking your pharmacy to contact your provider’s office with a refill request. Medication refills are processed only during regular business hours and may not be available until the next business day. Your provider may request additional information or to have a follow-up visit with you prior to refilling your medication.   *Please Note: Medication refills are assigned a new Rx number when refilled electronically. Your pharmacy may indicate that no refills were authorized even though a new  prescription for the same medication is available at the pharmacy. Please request the medicine by name with the pharmacy before contacting your provider for a refill.        Other Notes About Your Plan     GYN: Dr. Harris  Rheumatology: Dr. De Luna  Pulmonary: Jen  GS: Dr. Henrry Gonzalez  Opth: Dr. Blatazar Camp Access Code: Activation code not generated  Current [facility] Status: Active

## 2017-10-02 RX ORDER — ESCITALOPRAM OXALATE 20 MG/1
20 TABLET ORAL
Qty: 90 TAB | Refills: 3 | Status: SHIPPED | OUTPATIENT
Start: 2017-10-02 | End: 2018-06-20 | Stop reason: SDUPTHER

## 2017-10-20 DIAGNOSIS — R60.0 BILATERAL LOWER EXTREMITY EDEMA: ICD-10-CM

## 2017-10-20 DIAGNOSIS — I10 ESSENTIAL HYPERTENSION: Chronic | ICD-10-CM

## 2017-10-20 RX ORDER — POTASSIUM CHLORIDE 20 MEQ/1
TABLET, EXTENDED RELEASE ORAL
Qty: 30 TAB | Refills: 3 | Status: SHIPPED | OUTPATIENT
Start: 2017-10-20 | End: 2018-03-09 | Stop reason: SDUPTHER

## 2017-10-20 RX ORDER — FUROSEMIDE 20 MG/1
20 TABLET ORAL
Qty: 30 TAB | Refills: 3 | Status: SHIPPED | OUTPATIENT
Start: 2017-10-20 | End: 2018-03-09 | Stop reason: SDUPTHER

## 2017-11-17 ENCOUNTER — OFFICE VISIT (OUTPATIENT)
Dept: INTERNAL MEDICINE | Facility: IMAGING CENTER | Age: 47
End: 2017-11-17
Payer: COMMERCIAL

## 2017-11-17 VITALS
TEMPERATURE: 97.7 F | RESPIRATION RATE: 14 BRPM | BODY MASS INDEX: 35.63 KG/M2 | SYSTOLIC BLOOD PRESSURE: 130 MMHG | WEIGHT: 227 LBS | HEIGHT: 67 IN | HEART RATE: 82 BPM | OXYGEN SATURATION: 95 % | DIASTOLIC BLOOD PRESSURE: 86 MMHG

## 2017-11-17 DIAGNOSIS — J06.9 UPPER RESPIRATORY TRACT INFECTION, UNSPECIFIED TYPE: ICD-10-CM

## 2017-11-17 DIAGNOSIS — J30.89 OTHER ALLERGIC RHINITIS: ICD-10-CM

## 2017-11-17 DIAGNOSIS — I10 ESSENTIAL HYPERTENSION: Chronic | ICD-10-CM

## 2017-11-17 PROCEDURE — 99214 OFFICE O/P EST MOD 30 MIN: CPT | Performed by: FAMILY MEDICINE

## 2017-11-17 RX ORDER — AZITHROMYCIN 250 MG/1
TABLET, FILM COATED ORAL
Qty: 6 TAB | Refills: 0 | Status: SHIPPED | OUTPATIENT
Start: 2017-11-17 | End: 2018-01-05

## 2017-11-17 RX ORDER — MOMETASONE FUROATE 50 UG/1
2 SPRAY, METERED NASAL DAILY
Qty: 3 INHALER | Refills: 4 | Status: SHIPPED | OUTPATIENT
Start: 2017-11-17 | End: 2019-03-18 | Stop reason: SDUPTHER

## 2017-11-17 RX ORDER — LEVALBUTEROL INHALATION SOLUTION 0.63 MG/3ML
0.63 SOLUTION RESPIRATORY (INHALATION) EVERY 8 HOURS PRN
Qty: 24 ML | Refills: 3 | Status: SHIPPED | OUTPATIENT
Start: 2017-11-17 | End: 2019-11-11 | Stop reason: SDUPTHER

## 2017-11-17 NOTE — PROGRESS NOTES
"CC: URI for past 11 days    HPI:  Patient is a 47 y.o. female established patient who presents today to for evaluation of new URI symptoms, including nasal congestion, scratchy throat, clogged ears, and hacking cough present for the past 11 days. At the beginning of her illness, she also experienced GI symptoms that have since resolved. She has used her 2 inhalers, nasonex, humidifier with Vicks, salt water gargles, and intermittent mucinex without complete resolution of her symptoms. She continues to have nighttime cough and sputum production. She has chronic essential HTN and has brought in her home BP log today - averaging 130s/80s and b/l LE edema is completely resolved on daily lasix 20 mg/ KCl 20 meq daily.     Patient Active Problem List    Diagnosis Date Noted   • Rheumatoid arthritis involving multiple sites with positive rheumatoid factor (CMS-HCC) 05/17/2017   • Obesity (BMI 35.0-39.9 without comorbidity) 11/11/2016   • Essential hypertension 11/11/2016   • Family history of stroke 04/13/2016   • Hypothyroidism 08/28/2015   • Heterozygous MTHFR mutation F0394Q (CMS-HCC) 11/07/2014   • Heterozygous MTHFR mutation C677T (CMS-HCC) 11/07/2014   • Elevated homocysteine (CMS-HCC) 11/07/2014   • Gluten intolerance 05/16/2014   • Mixed hyperlipidemia with apolipoprotein E4 variant 05/16/2014   • Vitamin D deficiency disease 05/16/2014   • GERD (gastroesophageal reflux disease)    • Polycystic ovaries    • Dysthymic disorder 11/07/2012   • Asthma 11/07/2012   • Premature surgical menopause on HRT 11/07/2012   • Dry eyes 11/07/2012       Past medical, surgical, family, and social history was reviewed and updated in Epic chart by me today.     Medications and allergies reviewed and updated in Epic chart by me today.     ROS:  Pertinent positives listed above in HPI. All other systems have been reviewed and are negative.    PE:   /86   Pulse 82   Temp 36.5 °C (97.7 °F)   Resp 14   Ht 1.702 m (5' 7.01\")   " Wt 103 kg (227 lb)   LMP 10/06/2010   SpO2 95%   BMI 35.54 kg/m²   Vital signs reviewed with patient.     Gen: Well developed; well nourished; no acute distress; age appropriate appearance   HEENT: Normocephalic; atraumatic; PEERLA b/l; sclera clear b/l; b/l external auditory canals WNL; b/l TM WNL; b/l turbinates inflamed; oropharynx clear/posterior injected; oral mucosa moist; tongue midline; dentition adequate   Neck: No adenopathy; no thyromegaly  CV: Regular rate and rhythm; S1/ S2 present; no murmur, gallop or rub noted  Pulm: No respiratory distress; clear to ascultation b/l; no wheezing or stridor noted b/l  Abd: Adequate bowel sounds noted; soft and nontender; no rebound, rigidity, nor distention  Extremities: No significant edema b/l LE extremities/ no clubbing nor cyanosis noted  Skin: Warm and dry; no rashes noted   Neuro: No focal deficits noted   Psych: AAOx4; mood and affect are appropriate    A/P:  1. Other allergic rhinitis  Stable/ pt can use nasonex BID while ill/ refill sent to pharmacy.  - mometasone (NASONEX) 50 MCG/ACT nasal spray; Spray 2 Sprays in nose every day.  Dispense: 3 Inhaler; Refill: 4    2. Upper respiratory tract infection, unspecified type  Pt is to continue mucinex BID/nasonex BID while ill/ inhalers and nebulizers as needed/ OTC cough medication (she did not want RX type)/ rest/ maintain hydration/ continue salt gargles/ use humidifier.   - azithromycin (ZITHROMAX) 250 MG Tab; Take tablets by mouth as directed.  Dispense: 6 Tab; Refill: 0  - mometasone (NASONEX) 50 MCG/ACT nasal spray; Spray 2 Sprays in nose every day.  Dispense: 3 Inhaler; Refill: 4  - levalbuterol (XOPENEX) 0.63 MG/3ML Nebu Soln; 3 mL by Nebulization route every 8 hours as needed.  Dispense: 24 mL; Refill: 3    3. Chronic essential hypertension  Stable/ pt to continue current daily medication - also b/l LE edema resolved.    Pt will call our office if current URI does not improve with treatment plan  outline above.

## 2018-01-05 ENCOUNTER — OFFICE VISIT (OUTPATIENT)
Dept: INTERNAL MEDICINE | Facility: IMAGING CENTER | Age: 48
End: 2018-01-05
Payer: COMMERCIAL

## 2018-01-05 VITALS
RESPIRATION RATE: 14 BRPM | DIASTOLIC BLOOD PRESSURE: 84 MMHG | HEART RATE: 88 BPM | SYSTOLIC BLOOD PRESSURE: 128 MMHG | BODY MASS INDEX: 34.84 KG/M2 | WEIGHT: 222 LBS | HEIGHT: 67 IN | TEMPERATURE: 98.6 F | OXYGEN SATURATION: 94 %

## 2018-01-05 DIAGNOSIS — R05.9 COUGH: ICD-10-CM

## 2018-01-05 DIAGNOSIS — E66.9 OBESITY (BMI 35.0-39.9 WITHOUT COMORBIDITY): Chronic | ICD-10-CM

## 2018-01-05 DIAGNOSIS — J10.1 INFLUENZA A: ICD-10-CM

## 2018-01-05 LAB
FLUAV+FLUBV AG SPEC QL IA: NORMAL
INT CON NEG: NEGATIVE
INT CON POS: POSITIVE

## 2018-01-05 PROCEDURE — 99214 OFFICE O/P EST MOD 30 MIN: CPT | Performed by: FAMILY MEDICINE

## 2018-01-05 PROCEDURE — 87804 INFLUENZA ASSAY W/OPTIC: CPT | Performed by: FAMILY MEDICINE

## 2018-01-05 RX ORDER — OSELTAMIVIR PHOSPHATE 75 MG/1
75 CAPSULE ORAL 2 TIMES DAILY
Qty: 10 CAP | Refills: 0 | Status: SHIPPED | OUTPATIENT
Start: 2018-01-05 | End: 2018-03-09

## 2018-01-05 NOTE — PROGRESS NOTES
"Chief Complaint   Patient presents with   • Cough   • Emesis   • Diarrhea       HPI:  Patient is a 47 y.o. female established patient who presents today for evaluation of new illness present for the past week including body aches, poor appetite, malaise, nausea, congestion and dry cough. She recently traveled to [facility] and has student sick with Influenza A currently. She denies associated F/D/bowel or bladder concerns and has been using Dayquil/Nyquil products for relief. She reports that her other medical conditions are stable at this time and she would like to obtain Pneumovax when feeling better.     Patient Active Problem List    Diagnosis Date Noted   • Rheumatoid arthritis involving multiple sites with positive rheumatoid factor (CMS-HCC) 05/17/2017   • Obesity (BMI 35.0-39.9 without comorbidity) 11/11/2016   • Essential hypertension 11/11/2016   • Family history of stroke 04/13/2016   • Hypothyroidism 08/28/2015   • Heterozygous MTHFR mutation [id number] (CMS-HCC) 11/07/2014   • Heterozygous MTHFR mutation C677T (CMS-HCC) 11/07/2014   • Elevated homocysteine (CMS-HCC) 11/07/2014   • Gluten intolerance 05/16/2014   • Mixed hyperlipidemia with apolipoprotein E4 variant 05/16/2014   • Vitamin D deficiency disease 05/16/2014   • GERD (gastroesophageal reflux disease)    • Polycystic ovaries    • Dysthymic disorder 11/07/2012   • Asthma 11/07/2012   • Premature surgical menopause on HRT 11/07/2012   • Dry eyes 11/07/2012     Past medical, surgical, family, and social history was reviewed and updated in Epic chart by me today.     Medications and allergies reviewed and updated in Epic chart by me today.     ROS:  Pertinent positives listed above in HPI. All other systems have been reviewed and are negative.    PE:   /84   Pulse 88   Temp 37 °C (98.6 °F)   Resp 14   Ht 1.702 m (5' 7.01\")   Wt 100.7 kg (222 lb)   LMP 10/06/2010   SpO2 94%   BMI 34.76 kg/m²   Vital signs reviewed with patient.     Gen: " Well developed; well nourished; no acute distress; ill appearance   HEENT: Normocephalic; atraumatic; PEERLA b/l; sclera clear b/l; b/l external auditory canals WNL; b/l TM WNL; oropharynx clear; oral mucosa moist; tongue midline; dentition adequate   Neck: No adenopathy; no thyromegaly  CV: Regular rate and rhythm; S1/ S2 present; no murmur, gallop or rub noted  Pulm: No respiratory distress; clear to ascultation b/l; no wheezing or stridor noted b/l  Abd: Adequate bowel sounds noted; soft and nontender; no rebound, rigidity, nor distention; obese  Extremities: No peripheral edema b/l LE extremities/ no clubbing nor cyanosis noted  Skin: Warm and dry; no rashes noted   Neuro: No focal deficits noted   Psych: AAOx4; mood and affect are appropriate    A/P:  1. Influenza A  POCT positive for Influenza A today/ Pt is to start Tamiflu ASAP, continue hydration, rest, good hand hygiene, and stay out of public contact until she feels better.   - oseltamivir (TAMIFLU) 75 MG Cap; Take 1 Cap by mouth 2 times a day.  Dispense: 10 Cap; Refill: 0    2. Cough  Recommend mucinex BID/ OTC cough medication as needed for cough control.   - POCT Influenza A/B    3. Chronic obesity (BMI 35.0-39.9 without comorbidity)  Stable  - Patient identified as having weight management issue.  Appropriate orders and counseling given.      Pt is to call our office if current condition does not improve with treatment plans above. Ok for her to obtain Pneumovax when feeling well.

## 2018-01-27 ENCOUNTER — HOSPITAL ENCOUNTER (OUTPATIENT)
Dept: LAB | Facility: MEDICAL CENTER | Age: 48
End: 2018-01-27
Attending: SPECIALIST
Payer: COMMERCIAL

## 2018-01-27 LAB
ALBUMIN SERPL BCP-MCNC: 4.1 G/DL (ref 3.2–4.9)
ALBUMIN/GLOB SERPL: 1.6 G/DL
ALP SERPL-CCNC: 68 U/L (ref 30–99)
ALT SERPL-CCNC: 13 U/L (ref 2–50)
ANION GAP SERPL CALC-SCNC: 7 MMOL/L (ref 0–11.9)
AST SERPL-CCNC: 18 U/L (ref 12–45)
BASOPHILS # BLD AUTO: 0.8 % (ref 0–1.8)
BASOPHILS # BLD: 0.03 K/UL (ref 0–0.12)
BILIRUB SERPL-MCNC: 0.5 MG/DL (ref 0.1–1.5)
BUN SERPL-MCNC: 14 MG/DL (ref 8–22)
CALCIUM SERPL-MCNC: 9.3 MG/DL (ref 8.5–10.5)
CHLORIDE SERPL-SCNC: 107 MMOL/L (ref 96–112)
CO2 SERPL-SCNC: 28 MMOL/L (ref 20–33)
CREAT SERPL-MCNC: 0.86 MG/DL (ref 0.5–1.4)
EOSINOPHIL # BLD AUTO: 0.12 K/UL (ref 0–0.51)
EOSINOPHIL NFR BLD: 3.1 % (ref 0–6.9)
ERYTHROCYTE [DISTWIDTH] IN BLOOD BY AUTOMATED COUNT: 43.4 FL (ref 35.9–50)
GLOBULIN SER CALC-MCNC: 2.5 G/DL (ref 1.9–3.5)
GLUCOSE SERPL-MCNC: 83 MG/DL (ref 65–99)
HCT VFR BLD AUTO: 48.4 % (ref 37–47)
HGB BLD-MCNC: 15.8 G/DL (ref 12–16)
IMM GRANULOCYTES # BLD AUTO: 0 K/UL (ref 0–0.11)
IMM GRANULOCYTES NFR BLD AUTO: 0 % (ref 0–0.9)
LYMPHOCYTES # BLD AUTO: 1.3 K/UL (ref 1–4.8)
LYMPHOCYTES NFR BLD: 33.5 % (ref 22–41)
MCH RBC QN AUTO: 29.5 PG (ref 27–33)
MCHC RBC AUTO-ENTMCNC: 32.6 G/DL (ref 33.6–35)
MCV RBC AUTO: 90.3 FL (ref 81.4–97.8)
MONOCYTES # BLD AUTO: 0.32 K/UL (ref 0–0.85)
MONOCYTES NFR BLD AUTO: 8.2 % (ref 0–13.4)
NEUTROPHILS # BLD AUTO: 2.11 K/UL (ref 2–7.15)
NEUTROPHILS NFR BLD: 54.4 % (ref 44–72)
NRBC # BLD AUTO: 0 K/UL
NRBC BLD-RTO: 0 /100 WBC
PLATELET # BLD AUTO: 189 K/UL (ref 164–446)
PMV BLD AUTO: 12 FL (ref 9–12.9)
POTASSIUM SERPL-SCNC: 3.6 MMOL/L (ref 3.6–5.5)
PROT SERPL-MCNC: 6.6 G/DL (ref 6–8.2)
RBC # BLD AUTO: 5.36 M/UL (ref 4.2–5.4)
SODIUM SERPL-SCNC: 142 MMOL/L (ref 135–145)
WBC # BLD AUTO: 3.9 K/UL (ref 4.8–10.8)

## 2018-01-27 PROCEDURE — 80053 COMPREHEN METABOLIC PANEL: CPT

## 2018-01-27 PROCEDURE — 85025 COMPLETE CBC W/AUTO DIFF WBC: CPT

## 2018-01-27 PROCEDURE — 36415 COLL VENOUS BLD VENIPUNCTURE: CPT

## 2018-03-09 ENCOUNTER — OFFICE VISIT (OUTPATIENT)
Dept: INTERNAL MEDICINE | Facility: IMAGING CENTER | Age: 48
End: 2018-03-09
Payer: COMMERCIAL

## 2018-03-09 VITALS
OXYGEN SATURATION: 95 % | SYSTOLIC BLOOD PRESSURE: 144 MMHG | HEART RATE: 76 BPM | RESPIRATION RATE: 14 BRPM | HEIGHT: 67 IN | DIASTOLIC BLOOD PRESSURE: 86 MMHG | WEIGHT: 220 LBS | BODY MASS INDEX: 34.53 KG/M2 | TEMPERATURE: 97.9 F

## 2018-03-09 DIAGNOSIS — Z12.31 ENCOUNTER FOR SCREENING MAMMOGRAM FOR BREAST CANCER: ICD-10-CM

## 2018-03-09 DIAGNOSIS — R60.0 BILATERAL LOWER EXTREMITY EDEMA: ICD-10-CM

## 2018-03-09 DIAGNOSIS — Z23 NEED FOR 23-POLYVALENT PNEUMOCOCCAL POLYSACCHARIDE VACCINE: ICD-10-CM

## 2018-03-09 DIAGNOSIS — I10 ESSENTIAL HYPERTENSION: Chronic | ICD-10-CM

## 2018-03-09 PROCEDURE — 90732 PPSV23 VACC 2 YRS+ SUBQ/IM: CPT | Performed by: FAMILY MEDICINE

## 2018-03-09 PROCEDURE — 99214 OFFICE O/P EST MOD 30 MIN: CPT | Mod: 25 | Performed by: FAMILY MEDICINE

## 2018-03-09 PROCEDURE — 90471 IMMUNIZATION ADMIN: CPT | Performed by: FAMILY MEDICINE

## 2018-03-09 RX ORDER — FUROSEMIDE 20 MG/1
40 TABLET ORAL
Qty: 60 TAB | Refills: 6 | Status: SHIPPED | OUTPATIENT
Start: 2018-03-09 | End: 2019-03-08

## 2018-03-09 RX ORDER — POTASSIUM CHLORIDE 20 MEQ/1
40 TABLET, EXTENDED RELEASE ORAL DAILY
Qty: 60 TAB | Refills: 6 | Status: SHIPPED | OUTPATIENT
Start: 2018-03-09 | End: 2019-03-08

## 2018-03-09 ASSESSMENT — PATIENT HEALTH QUESTIONNAIRE - PHQ9: CLINICAL INTERPRETATION OF PHQ2 SCORE: 0

## 2018-03-09 NOTE — PROGRESS NOTES
Chief Complaint   Patient presents with   • Blood Pressure Problem       HPI:  Patient is a 48 y.o. female established patient who presents today to discuss her ongoing blood pressure lability. Her home BP readings have ranged 132-144/86-90 and she has chronic essential HTN. She also has been dealing with bilateral lower extremity edema that has improved with lasix 20 mg daily. Recent labs done by Dr. De Luna are stable with no decreased renal function. She is otherwise feeling well today and will up date her GYN exam this year with Dr. Harris. She reports increased work stress over the past nine months, which is now starting to decrease. She is planning to increase her physical activity now which will help her overall condition. She is due for pneumovax (approved by Dr. De Luna) and was reminded to obtain her screening mammogram.     Patient Active Problem List    Diagnosis Date Noted   • Rheumatoid arthritis involving multiple sites with positive rheumatoid factor (CMS-HCC) 05/17/2017   • Obesity (BMI 35.0-39.9 without comorbidity) 11/11/2016   • Essential hypertension 11/11/2016   • Family history of stroke 04/13/2016   • Hypothyroidism 08/28/2015   • Heterozygous MTHFR mutation [id number] (CMS-HCC) 11/07/2014   • Heterozygous MTHFR mutation C677T (CMS-HCC) 11/07/2014   • Elevated homocysteine (CMS-HCC) 11/07/2014   • Gluten intolerance 05/16/2014   • Mixed hyperlipidemia with apolipoprotein E4 variant 05/16/2014   • Vitamin D deficiency disease 05/16/2014   • GERD (gastroesophageal reflux disease)    • Polycystic ovaries    • Dysthymic disorder 11/07/2012   • Asthma 11/07/2012   • Premature surgical menopause on HRT 11/07/2012   • Dry eyes 11/07/2012     Past medical, surgical, family, and social history was reviewed in Epic chart by me today.     Medications and allergies reviewed and updated in Epic chart by me today.     ROS:  Pertinent positives listed above in HPI. All other systems have been reviewed and are  "negative.    PE:   /86   Pulse 76   Temp 36.6 °C (97.9 °F)   Resp 14   Ht 1.702 m (5' 7.01\")   Wt 99.8 kg (220 lb)   LMP 10/06/2010   SpO2 95%   BMI 34.45 kg/m²   Vital signs reviewed with patient.     Gen: Well developed; well nourished; no acute distress; age appropriate appearance   HEENT: Normocephalic; atraumatic; PEERLA b/l; sclera clear b/l; b/l external auditory canals WNL; b/l TM WNL; nares patent b/l; oropharynx clear; oral mucosa moist; tongue midline; dentition adequate   Neck: No adenopathy; no thyromegaly  CV: Regular rate and rhythm; S1/ S2 present; no murmur, gallop or rub noted  Pulm: No respiratory distress; clear to ascultation b/l; no wheezing or stridor noted b/l  Abd: Adequate bowel sounds noted; soft and nontender; no rebound, rigidity, nor distention; obese  Extremities: 1+ peripheral edema b/l LE extremities/ no clubbing nor cyanosis noted  Skin: Warm and dry; no rashes noted   Neuro: No focal deficits noted   Psych: AAOx4; mood and affect are appropriate    A/P:  1. Need for 23-polyvalent pneumococcal polysaccharide vaccine  Vaccine administered at visit today.   - PneumoVax PPV23 =>1yo    2. Encounter for screening mammogram for breast cancer  Pt is overdue for mammogram - provided her with number to call to schedule this important screening test.   - MA-SCREEN MAMMO W/CAD-BILAT; Future    3. Chronic essential hypertension  Improved but not fully controlled/ recommend trial of increasing lasix to 40 mg daily (can stagger dose) with increased KCl dose and follow response for both blood pressure and lower extremity edema. Pt is to continue home blood pressure monitoring daily.   - furosemide (LASIX) 20 MG Tab; Take 2 Tabs by mouth every day.  Dispense: 60 Tab; Refill: 6  - potassium chloride SA (KDUR) 20 MEQ Tab CR; Take 2 Tabs by mouth every day. (when taking lasix tablets)  Dispense: 60 Tab; Refill: 6    4. Bilateral lower extremity edema  Improved overall/ refer to #3/ will " follow response of increased medication use over the next few weeks.   - furosemide (LASIX) 20 MG Tab; Take 2 Tabs by mouth every day.  Dispense: 60 Tab; Refill: 6  - potassium chloride SA (KDUR) 20 MEQ Tab CR; Take 2 Tabs by mouth every day. (when taking lasix tablets)  Dispense: 60 Tab; Refill: 6      Pt is to update me within the next month about her blood pressure and edema response to medication changes.

## 2018-03-15 DIAGNOSIS — R60.0 BILATERAL LOWER EXTREMITY EDEMA: ICD-10-CM

## 2018-03-15 DIAGNOSIS — I10 ESSENTIAL HYPERTENSION: Chronic | ICD-10-CM

## 2018-03-15 RX ORDER — POTASSIUM CHLORIDE 20 MEQ/1
TABLET, EXTENDED RELEASE ORAL
Refills: 2 | OUTPATIENT
Start: 2018-03-15

## 2018-03-15 RX ORDER — FUROSEMIDE 20 MG/1
TABLET ORAL
Refills: 2 | OUTPATIENT
Start: 2018-03-15

## 2018-03-20 DIAGNOSIS — E03.9 HYPOTHYROIDISM, UNSPECIFIED TYPE: ICD-10-CM

## 2018-03-20 RX ORDER — LEVOTHYROXINE SODIUM 50 MCG
TABLET ORAL
Qty: 180 TAB | Refills: 0 | Status: SHIPPED | OUTPATIENT
Start: 2018-03-20 | End: 2018-06-18 | Stop reason: SDUPTHER

## 2018-03-30 ENCOUNTER — OFFICE VISIT (OUTPATIENT)
Dept: INTERNAL MEDICINE | Facility: IMAGING CENTER | Age: 48
End: 2018-03-30
Payer: COMMERCIAL

## 2018-03-30 ENCOUNTER — HOSPITAL ENCOUNTER (OUTPATIENT)
Dept: RADIOLOGY | Facility: MEDICAL CENTER | Age: 48
End: 2018-03-30
Attending: FAMILY MEDICINE
Payer: COMMERCIAL

## 2018-03-30 ENCOUNTER — HOSPITAL ENCOUNTER (OUTPATIENT)
Dept: RADIOLOGY | Facility: MEDICAL CENTER | Age: 48
End: 2018-03-30

## 2018-03-30 VITALS
BODY MASS INDEX: 34.53 KG/M2 | RESPIRATION RATE: 14 BRPM | HEART RATE: 67 BPM | OXYGEN SATURATION: 96 % | SYSTOLIC BLOOD PRESSURE: 140 MMHG | HEIGHT: 67 IN | DIASTOLIC BLOOD PRESSURE: 80 MMHG | WEIGHT: 220 LBS | TEMPERATURE: 98.1 F

## 2018-03-30 DIAGNOSIS — Z12.31 ENCOUNTER FOR SCREENING MAMMOGRAM FOR BREAST CANCER: ICD-10-CM

## 2018-03-30 DIAGNOSIS — L30.9 DERMATITIS: ICD-10-CM

## 2018-03-30 PROCEDURE — 99213 OFFICE O/P EST LOW 20 MIN: CPT | Performed by: FAMILY MEDICINE

## 2018-03-30 PROCEDURE — 77063 BREAST TOMOSYNTHESIS BI: CPT

## 2018-03-30 RX ORDER — TRIAMCINOLONE ACETONIDE 1 MG/G
1 OINTMENT TOPICAL 2 TIMES DAILY
Qty: 1 TUBE | Refills: 1 | Status: SHIPPED | OUTPATIENT
Start: 2018-03-30 | End: 2018-04-06

## 2018-03-30 NOTE — PROGRESS NOTES
"Chief Complaint   Patient presents with   • Rash     R side of chin       HPI:  Patient is a 48 y.o. female established patient who presents today for evaluation of new red, itchy rash on R chin area present since having a facial and microdermaplane two months ago. She has been using OTC hydrocortisone cream intermittently without resolution but she denies that there is discharge or scabbing at rash site. She is otherwise well at this time.     Patient Active Problem List    Diagnosis Date Noted   • Rheumatoid arthritis involving multiple sites with positive rheumatoid factor (CMS-HCC) 05/17/2017   • Obesity (BMI 35.0-39.9 without comorbidity) 11/11/2016   • Essential hypertension 11/11/2016   • Family history of stroke 04/13/2016   • Hypothyroidism 08/28/2015   • Heterozygous MTHFR mutation L8134M (CMS-HCC) 11/07/2014   • Heterozygous MTHFR mutation C677T (CMS-HCC) 11/07/2014   • Elevated homocysteine (CMS-HCC) 11/07/2014   • Gluten intolerance 05/16/2014   • Mixed hyperlipidemia with apolipoprotein E4 variant 05/16/2014   • Vitamin D deficiency disease 05/16/2014   • GERD (gastroesophageal reflux disease)    • Polycystic ovaries    • Dysthymic disorder 11/07/2012   • Asthma 11/07/2012   • Premature surgical menopause on HRT 11/07/2012   • Dry eyes 11/07/2012     Past medical, surgical, family, and social history was reviewed and updated in Epic chart by me today.     Medications and allergies reviewed and updated in Epic chart by me today.     ROS:  Pertinent positives listed above in HPI. All other systems have been reviewed and are negative.    PE:   /80   Pulse 67   Temp 36.7 °C (98.1 °F)   Resp 14   Ht 1.702 m (5' 7.01\")   Wt 99.8 kg (220 lb)   LMP 10/06/2010   SpO2 96%   BMI 34.45 kg/m²   Vital signs reviewed with patient.     Gen: Well developed; well nourished; no acute distress; age appropriate appearance   Skin: Warm and dry; mild pink patch of bumps noted on R chin area near crease of " mouth  Neuro: No focal deficits noted   Psych: AAOx4; mood and affect are appropriate    A/P:  1. Dermatitis  Recommend patient tiral Kenalog ointment, use Cerave or Eucerin and follow response.   - triamcinolone acetonide (KENALOG) 0.1 % Ointment; Apply 1 Application to affected area(s) 2 times a day for 7 days. (use sparingly to affected chin area)  Dispense: 1 Tube; Refill: 1     Face to face time: 15 minutes spent with greater than 50% of time spent with direct patient care and counseling about diagnosis, prognosis, risk versus benefits of treatment, and importance of compliance with instructions. All questions answered and support provided during visit today.

## 2018-06-01 ENCOUNTER — OFFICE VISIT (OUTPATIENT)
Dept: INTERNAL MEDICINE | Facility: IMAGING CENTER | Age: 48
End: 2018-06-01
Payer: COMMERCIAL

## 2018-06-01 VITALS
DIASTOLIC BLOOD PRESSURE: 88 MMHG | HEIGHT: 67 IN | TEMPERATURE: 98.1 F | BODY MASS INDEX: 35.16 KG/M2 | SYSTOLIC BLOOD PRESSURE: 140 MMHG | OXYGEN SATURATION: 95 % | HEART RATE: 83 BPM | WEIGHT: 224 LBS | RESPIRATION RATE: 14 BRPM

## 2018-06-01 DIAGNOSIS — Z79.890 PREMATURE SURGICAL MENOPAUSE ON HRT: Chronic | ICD-10-CM

## 2018-06-01 DIAGNOSIS — I10 ESSENTIAL HYPERTENSION: Chronic | ICD-10-CM

## 2018-06-01 DIAGNOSIS — E55.9 VITAMIN D DEFICIENCY DISEASE: Chronic | ICD-10-CM

## 2018-06-01 DIAGNOSIS — F34.1 DYSTHYMIC DISORDER: Chronic | ICD-10-CM

## 2018-06-01 DIAGNOSIS — R21 SKIN RASH: ICD-10-CM

## 2018-06-01 DIAGNOSIS — E03.9 ACQUIRED HYPOTHYROIDISM: Chronic | ICD-10-CM

## 2018-06-01 DIAGNOSIS — E78.2 MIXED HYPERLIPIDEMIA WITH APOLIPOPROTEIN E4 VARIANT: Chronic | ICD-10-CM

## 2018-06-01 DIAGNOSIS — Z87.898 HISTORY OF PERIPHERAL EDEMA: ICD-10-CM

## 2018-06-01 DIAGNOSIS — E89.40 PREMATURE SURGICAL MENOPAUSE ON HRT: Chronic | ICD-10-CM

## 2018-06-01 PROCEDURE — 99214 OFFICE O/P EST MOD 30 MIN: CPT | Performed by: FAMILY MEDICINE

## 2018-06-01 NOTE — PROGRESS NOTES
"Chief Complaint   Patient presents with   • Other     feeling distant and flat for one month       HPI:  Patient is a 48 y.o. female established patient who presents today for evaluation of new one month history of feeling \"flat and distant\". She reports that she is lacking motivation to do basic activities and is experiencing changes in mood and hot flashes. She has been on long term HRT managed by her GYN team and denies recent stressors nor changes in medications or routine. She went to her GYN office last Wednesday for GYN exam with pap smear and has pending hormone lab orders. She also has been taking lasix 20 - 40 mg daily to control peripheral edema with burning issues and recently has noticed that she feels dizzy on the higher dosage. She discontinued lasix use entirely and now feels that her feet \"are on fire\" again without significant edema noted. Her home BP averages have been 118/80, and she denies changes in her other chronic medical conditions. She also has used kenalog cream on faint red chin rash without any change in her condition. She denies any safety issues and is wondering if she needs medication adjustments to make her feel improved.     Patient Active Problem List    Diagnosis Date Noted   • Rheumatoid arthritis involving multiple sites with positive rheumatoid factor ([facility]) 05/17/2017   • Obesity (BMI 35.0-39.9 without comorbidity) 11/11/2016   • Essential hypertension 11/11/2016   • Family history of stroke 04/13/2016   • Hypothyroidism 08/28/2015   • Heterozygous MTHFR mutation [id number] ([facility]) 11/07/2014   • Heterozygous MTHFR mutation C677T ([facility]) 11/07/2014   • Elevated homocysteine ([facility]) 11/07/2014   • Gluten intolerance 05/16/2014   • Mixed hyperlipidemia with apolipoprotein E4 variant 05/16/2014   • Vitamin D deficiency disease 05/16/2014   • GERD (gastroesophageal reflux disease)    • Polycystic ovaries    • Dysthymic disorder 11/07/2012   • Asthma 11/07/2012   • Premature surgical " "menopause on HRT 11/07/2012   • Dry eyes 11/07/2012     Past medical, surgical, family, and social history was reviewed in Epic chart by me today.     Medications and allergies reviewed and updated in Epic chart by me today.     ROS:  Pertinent positives listed above in HPI. All other systems have been reviewed and are negative.    PE:   /88   Pulse 83   Temp 36.7 °C (98.1 °F)   Resp 14   Ht 1.702 m (5' 7.01\")   Wt 101.6 kg (224 lb)   LMP 10/06/2010   SpO2 95%   BMI 35.07 kg/m²   Vital signs reviewed with patient.     Gen: Well developed; well nourished; no acute distress; age appropriate appearance   CV: Regular rate and rhythm; S1/ S2 present; no murmur, gallop or rub noted  Pulm: No respiratory distress; clear to ascultation b/l; no wheezing or stridor noted b/l  Abd: Adequate bowel sounds noted; soft and nontender; no rebound, rigidity, nor distention  Extremities: No peripheral edema b/l LE extremities/ no clubbing nor cyanosis noted  Skin: Warm and dry; mild pink rash noted on chin area (no signs of secondary infection nor drainage)  Neuro: No focal deficits noted   Psych: AAOx4; mood and affect are appropriate    A/P:  1. Skin rash  Non responsive to steroid cream trial/ will refer to [facility] for evaluation and annual skin check exam.   - REFERRAL TO DERMATOLOGY    2. Chronic essential hypertension  Stable/ pt maintains good home BP monitoring (avg 118/80) and will check fasting labs.   - CBC WITH DIFFERENTIAL; Future  - COMP METABOLIC PANEL; Future    3. Chronic dysthymic disorder  Etiology unclear regarding patient's complaints about feeling distant and flat for the past month. I will check fasting labs to rule out metabolic component, and she has pending hormone labs from her GYN team. Next step would be to change her lexapro dose and follow response if lab results are unrevealing. She is to continue daily lexapro for now.     4. Chronic acquired hypothyroidism  I have a suspicion " that her thyroid labs may be source of her complaints over the last month. Pt is to continue daily levothyroxine and will obtain thyroid labs for further care planning.   - TSH WITH REFLEX TO FT4; Future  - T3 FREE; Future    5. Chronic mixed hyperlipidemia with apolipoprotein E4 variant  Pt is due for fasting lipid panel and is to continue daily fish oil supplementation  - LIPID PROFILE; Future    8. Chronic vitamin D deficiency disease  Stable/ pt is to continue daily vitamin D supplementation and is due for Vitamin D level  - VITAMIN D,25 HYDROXY; Future    9. Premature surgical menopause on HRT  Managed by GYN team with labs pending     10. History of peripheral edema  Discussed treatment strategies today with patient. I would prefer that she use the lasix 20 mg either whole or cut in half daily or every other day as tolerated to control her ongoing symptomatology.     I will follow up with patient regarding lab results when available for further care planning.

## 2018-06-02 ENCOUNTER — HOSPITAL ENCOUNTER (OUTPATIENT)
Dept: LAB | Facility: MEDICAL CENTER | Age: 48
End: 2018-06-02
Attending: FAMILY MEDICINE
Payer: COMMERCIAL

## 2018-06-02 ENCOUNTER — HOSPITAL ENCOUNTER (OUTPATIENT)
Dept: LAB | Facility: MEDICAL CENTER | Age: 48
End: 2018-06-02
Attending: SPECIALIST
Payer: COMMERCIAL

## 2018-06-02 ENCOUNTER — HOSPITAL ENCOUNTER (OUTPATIENT)
Dept: LAB | Facility: MEDICAL CENTER | Age: 48
End: 2018-06-02
Attending: NURSE PRACTITIONER
Payer: COMMERCIAL

## 2018-06-02 DIAGNOSIS — I10 ESSENTIAL HYPERTENSION: Chronic | ICD-10-CM

## 2018-06-02 DIAGNOSIS — E03.9 ACQUIRED HYPOTHYROIDISM: Chronic | ICD-10-CM

## 2018-06-02 DIAGNOSIS — E78.2 MIXED HYPERLIPIDEMIA WITH APOLIPOPROTEIN E4 VARIANT: Chronic | ICD-10-CM

## 2018-06-02 DIAGNOSIS — E55.9 VITAMIN D DEFICIENCY DISEASE: Chronic | ICD-10-CM

## 2018-06-02 LAB
25(OH)D3 SERPL-MCNC: 34 NG/ML (ref 30–100)
ALBUMIN SERPL BCP-MCNC: 4.1 G/DL (ref 3.2–4.9)
ALBUMIN SERPL BCP-MCNC: 4.3 G/DL (ref 3.2–4.9)
ALBUMIN/GLOB SERPL: 1.5 G/DL
ALBUMIN/GLOB SERPL: 1.6 G/DL
ALP SERPL-CCNC: 81 U/L (ref 30–99)
ALP SERPL-CCNC: 84 U/L (ref 30–99)
ALT SERPL-CCNC: 19 U/L (ref 2–50)
ALT SERPL-CCNC: 20 U/L (ref 2–50)
ANION GAP SERPL CALC-SCNC: 4 MMOL/L (ref 0–11.9)
ANION GAP SERPL CALC-SCNC: 4 MMOL/L (ref 0–11.9)
AST SERPL-CCNC: 20 U/L (ref 12–45)
AST SERPL-CCNC: 22 U/L (ref 12–45)
BASOPHILS # BLD AUTO: 0.7 % (ref 0–1.8)
BASOPHILS # BLD AUTO: 0.9 % (ref 0–1.8)
BASOPHILS # BLD: 0.03 K/UL (ref 0–0.12)
BASOPHILS # BLD: 0.04 K/UL (ref 0–0.12)
BILIRUB SERPL-MCNC: 0.6 MG/DL (ref 0.1–1.5)
BILIRUB SERPL-MCNC: 0.6 MG/DL (ref 0.1–1.5)
BUN SERPL-MCNC: 12 MG/DL (ref 8–22)
BUN SERPL-MCNC: 12 MG/DL (ref 8–22)
CALCIUM SERPL-MCNC: 8.9 MG/DL (ref 8.5–10.5)
CALCIUM SERPL-MCNC: 9.1 MG/DL (ref 8.5–10.5)
CHLORIDE SERPL-SCNC: 107 MMOL/L (ref 96–112)
CHLORIDE SERPL-SCNC: 107 MMOL/L (ref 96–112)
CHOLEST SERPL-MCNC: 206 MG/DL (ref 100–199)
CO2 SERPL-SCNC: 27 MMOL/L (ref 20–33)
CO2 SERPL-SCNC: 27 MMOL/L (ref 20–33)
CREAT SERPL-MCNC: 0.97 MG/DL (ref 0.5–1.4)
CREAT SERPL-MCNC: 1.05 MG/DL (ref 0.5–1.4)
EOSINOPHIL # BLD AUTO: 0.2 K/UL (ref 0–0.51)
EOSINOPHIL # BLD AUTO: 0.22 K/UL (ref 0–0.51)
EOSINOPHIL NFR BLD: 4.6 % (ref 0–6.9)
EOSINOPHIL NFR BLD: 4.9 % (ref 0–6.9)
ERYTHROCYTE [DISTWIDTH] IN BLOOD BY AUTOMATED COUNT: 41.8 FL (ref 35.9–50)
ERYTHROCYTE [DISTWIDTH] IN BLOOD BY AUTOMATED COUNT: 42.8 FL (ref 35.9–50)
ESTRADIOL SERPL-MCNC: 257 PG/ML
GLOBULIN SER CALC-MCNC: 2.7 G/DL (ref 1.9–3.5)
GLOBULIN SER CALC-MCNC: 2.8 G/DL (ref 1.9–3.5)
GLUCOSE SERPL-MCNC: 88 MG/DL (ref 65–99)
GLUCOSE SERPL-MCNC: 88 MG/DL (ref 65–99)
HCT VFR BLD AUTO: 47.7 % (ref 37–47)
HCT VFR BLD AUTO: 49.7 % (ref 37–47)
HDLC SERPL-MCNC: 59 MG/DL
HGB BLD-MCNC: 15.8 G/DL (ref 12–16)
HGB BLD-MCNC: 16.1 G/DL (ref 12–16)
IMM GRANULOCYTES # BLD AUTO: 0.01 K/UL (ref 0–0.11)
IMM GRANULOCYTES # BLD AUTO: 0.01 K/UL (ref 0–0.11)
IMM GRANULOCYTES NFR BLD AUTO: 0.2 % (ref 0–0.9)
IMM GRANULOCYTES NFR BLD AUTO: 0.2 % (ref 0–0.9)
LDLC SERPL CALC-MCNC: 127 MG/DL
LYMPHOCYTES # BLD AUTO: 1.31 K/UL (ref 1–4.8)
LYMPHOCYTES # BLD AUTO: 1.39 K/UL (ref 1–4.8)
LYMPHOCYTES NFR BLD: 29.8 % (ref 22–41)
LYMPHOCYTES NFR BLD: 30.8 % (ref 22–41)
MCH RBC QN AUTO: 28.6 PG (ref 27–33)
MCH RBC QN AUTO: 30 PG (ref 27–33)
MCHC RBC AUTO-ENTMCNC: 31.8 G/DL (ref 33.6–35)
MCHC RBC AUTO-ENTMCNC: 33.8 G/DL (ref 33.6–35)
MCV RBC AUTO: 89 FL (ref 81.4–97.8)
MCV RBC AUTO: 90 FL (ref 81.4–97.8)
MONOCYTES # BLD AUTO: 0.31 K/UL (ref 0–0.85)
MONOCYTES # BLD AUTO: 0.35 K/UL (ref 0–0.85)
MONOCYTES NFR BLD AUTO: 6.9 % (ref 0–13.4)
MONOCYTES NFR BLD AUTO: 8 % (ref 0–13.4)
NEUTROPHILS # BLD AUTO: 2.48 K/UL (ref 2–7.15)
NEUTROPHILS # BLD AUTO: 2.55 K/UL (ref 2–7.15)
NEUTROPHILS NFR BLD: 56.5 % (ref 44–72)
NEUTROPHILS NFR BLD: 56.5 % (ref 44–72)
NRBC # BLD AUTO: 0 K/UL
NRBC # BLD AUTO: 0 K/UL
NRBC BLD-RTO: 0 /100 WBC
NRBC BLD-RTO: 0 /100 WBC
PLATELET # BLD AUTO: 193 K/UL (ref 164–446)
PLATELET # BLD AUTO: 195 K/UL (ref 164–446)
PMV BLD AUTO: 11.9 FL (ref 9–12.9)
PMV BLD AUTO: 12.1 FL (ref 9–12.9)
POTASSIUM SERPL-SCNC: 3.9 MMOL/L (ref 3.6–5.5)
POTASSIUM SERPL-SCNC: 4.1 MMOL/L (ref 3.6–5.5)
PROT SERPL-MCNC: 6.9 G/DL (ref 6–8.2)
PROT SERPL-MCNC: 7 G/DL (ref 6–8.2)
RBC # BLD AUTO: 5.36 M/UL (ref 4.2–5.4)
RBC # BLD AUTO: 5.52 M/UL (ref 4.2–5.4)
SODIUM SERPL-SCNC: 138 MMOL/L (ref 135–145)
SODIUM SERPL-SCNC: 138 MMOL/L (ref 135–145)
T3FREE SERPL-MCNC: 3.5 PG/ML (ref 2.4–4.2)
TRIGL SERPL-MCNC: 101 MG/DL (ref 0–149)
TSH SERPL DL<=0.005 MIU/L-ACNC: 0.97 UIU/ML (ref 0.38–5.33)
WBC # BLD AUTO: 4.4 K/UL (ref 4.8–10.8)
WBC # BLD AUTO: 4.5 K/UL (ref 4.8–10.8)

## 2018-06-02 PROCEDURE — 85025 COMPLETE CBC W/AUTO DIFF WBC: CPT | Mod: 91

## 2018-06-02 PROCEDURE — 80053 COMPREHEN METABOLIC PANEL: CPT | Mod: 91

## 2018-06-02 PROCEDURE — 80053 COMPREHEN METABOLIC PANEL: CPT

## 2018-06-02 PROCEDURE — 82306 VITAMIN D 25 HYDROXY: CPT

## 2018-06-02 PROCEDURE — 82670 ASSAY OF TOTAL ESTRADIOL: CPT

## 2018-06-02 PROCEDURE — 84481 FREE ASSAY (FT-3): CPT

## 2018-06-02 PROCEDURE — 84403 ASSAY OF TOTAL TESTOSTERONE: CPT

## 2018-06-02 PROCEDURE — 36415 COLL VENOUS BLD VENIPUNCTURE: CPT

## 2018-06-02 PROCEDURE — 84270 ASSAY OF SEX HORMONE GLOBUL: CPT

## 2018-06-02 PROCEDURE — 85025 COMPLETE CBC W/AUTO DIFF WBC: CPT

## 2018-06-02 PROCEDURE — 84443 ASSAY THYROID STIM HORMONE: CPT

## 2018-06-02 PROCEDURE — 80061 LIPID PANEL: CPT

## 2018-06-07 LAB
SHBG SERPL-SCNC: 42 NMOL/L (ref 30–135)
TESTOST FREE SERPL-MCNC: 70.6 PG/ML (ref 1.1–5.8)
TESTOST SERPL-MCNC: 435 NG/DL (ref 9–55)

## 2018-06-11 DIAGNOSIS — F34.1 DYSTHYMIC DISORDER: Chronic | ICD-10-CM

## 2018-06-11 RX ORDER — ESCITALOPRAM OXALATE 10 MG/1
10 TABLET ORAL DAILY
Qty: 90 TAB | Refills: 3 | Status: SHIPPED | OUTPATIENT
Start: 2018-06-11 | End: 2018-10-05 | Stop reason: SINTOL

## 2018-06-14 ENCOUNTER — OFFICE VISIT (OUTPATIENT)
Dept: DERMATOLOGY | Facility: IMAGING CENTER | Age: 48
End: 2018-06-14
Payer: COMMERCIAL

## 2018-06-14 VITALS — HEIGHT: 67 IN | BODY MASS INDEX: 31.39 KG/M2 | TEMPERATURE: 98.7 F | WEIGHT: 200 LBS

## 2018-06-14 DIAGNOSIS — L71.0 PERIORAL DERMATITIS: ICD-10-CM

## 2018-06-14 PROCEDURE — 99202 OFFICE O/P NEW SF 15 MIN: CPT | Performed by: DERMATOLOGY

## 2018-06-14 RX ORDER — DOXYCYCLINE HYCLATE 100 MG/1
CAPSULE ORAL
Qty: 150 CAP | Refills: 2 | Status: SHIPPED | OUTPATIENT
Start: 2018-06-14 | End: 2018-12-26

## 2018-06-14 RX ORDER — METRONIDAZOLE 7.5 MG/G
1 GEL TOPICAL 2 TIMES DAILY
Qty: 1 TUBE | Refills: 2 | Status: SHIPPED | OUTPATIENT
Start: 2018-06-14 | End: 2018-09-12

## 2018-06-14 ASSESSMENT — ENCOUNTER SYMPTOMS
FEVER: 0
CHILLS: 0

## 2018-06-14 NOTE — PROGRESS NOTES
Dermatology New Patient Visit    Chief Complaint   Patient presents with   • Rash       Subjective:     HPI:   Soraida Fregoso is a 48 y.o. female presenting for    HPI: rash on the chin   Onset: 8 months ago  Red, bumpy, very itchy  Aggravating factors: none , no known life stressors  Alleviating factors: no  New creams/topicals: none prior to onset of symptoms (no occlusive creams)  New medications (up to last 6-9 months): no   New travel: no  Other exposures: no   Treatments: triamcinolone 0.1%        Past Medical History:   Diagnosis Date   • Anemia     improved   • Anesthesia     N & V   • ASTHMA    • Depression    • GERD (gastroesophageal reflux disease)    • Grave's disease 8/2010   • Heart burn    • Hiatus hernia syndrome    • Hypothyroid    • Overweight(278.02)    • Polycystic ovaries    • rheumatoid 2000    rheumatoid       Current Outpatient Prescriptions on File Prior to Visit   Medication Sig Dispense Refill   • escitalopram (LEXAPRO) 10 MG Tab Take 1 Tab by mouth every day. (Take daily with 20 mg tab by mouth for total daily dose of 30 mg) 90 Tab 3   • SYNTHROID 50 MCG Tab TAKE 2 TABS BY MOUTH EVERY DAY. BRAND NAME ONLY!! 180 Tab 0   • furosemide (LASIX) 20 MG Tab Take 2 Tabs by mouth every day. 60 Tab 6   • potassium chloride SA (KDUR) 20 MEQ Tab CR Take 2 Tabs by mouth every day. (when taking lasix tablets) 60 Tab 6   • mometasone (NASONEX) 50 MCG/ACT nasal spray Spray 2 Sprays in nose every day. 3 Inhaler 4   • levalbuterol (XOPENEX) 0.63 MG/3ML Nebu Soln 3 mL by Nebulization route every 8 hours as needed. 24 mL 3   • escitalopram (LEXAPRO) 20 MG tablet Take 1 Tab by mouth every day. TAKE 1 TAB BY MOUTH EVERY DAY. 90 Tab 3   • TESTOSTERONE TD Apply  to skin as directed. Indications: compounded cream - 1/4 tsp applied to thigh daily     • albuterol 108 (90 BASE) MCG/ACT Aero Soln inhalation aerosol Inhale 2 Puffs by mouth every 6 hours as needed for Shortness of Breath. 8.5 g 12   • scopolamine  "(TRANSDERM-SCOP) 1.5 MG/3DAYS PATCH 72 HR Apply 1 Patch to skin as directed every 72 hours. 4 Patch 3   • Evening Primrose Oil 500 MG Cap Take 1 Cap by mouth 2 Times a Day.     • Estradiol (EVAMIST) 1.53 MG/SPRAY SOLN Apply 1-3 Sprays to skin as directed every day. 1 Bottle 0   • L-Methylfolate 1 MG TABS Take 1 Tab by mouth every day. Solgar - Metafolin     • leflunomide (ARAVA) 20 MG TABS Take 1 Tab by mouth every day. 90 Tab 3   • Cholecalciferol (VITAMIN D) 2000 UNITS CAPS Take 1 Cap by mouth every day. 30 Cap    • NON SPECIFIED Take 1 Each by mouth every day. Vitafusion Calcium 500mg Gummy Vitamins     • Fish Oil OIL 2 Each by Does not apply route every day. Nature Made Fish Oil [place] 3s EPA and DHA. 227 mg Fish Oil, Omega 3 57 mg, Omega 3 DHA 47.5 mg, Omega 3 EPA 9.5 mg     • multivitamin (THERAGRAN) per tablet Take 1 Tab by mouth every day.     • cyclosporin (RESTASIS) 0.05 % ophthalmic emulsion Place 1 Drop in both eyes 2 times a day. 1 Each 3   • beclomethasone (QVAR) 80 MCG/ACT inhaler Inhale 1 Puff by mouth every day.       No current facility-administered medications on file prior to visit.        Allergies   Allergen Reactions   • Sulfa Drugs Swelling   • Amoxicillin Rash       Family History   Problem Relation Age of Onset   • Stroke Father 41   • Heart Disease Father      \"hole\" in heart causing stroke   • Alcohol/Drug Brother    • Arthritis Sister      rheumatoid-severe       Social History     Social History   • Marital status:      Spouse name: N/A   • Number of children: N/A   • Years of education: N/A     Occupational History   • Not on file.     Social History Main Topics   • Smoking status: Never Smoker   • Smokeless tobacco: Never Used   • Alcohol use No      Comment: twice ayear   • Drug use: No   • Sexual activity: Yes     Partners: Male     Other Topics Concern   • Not on file     Social History Narrative   • No narrative on file       Review of Systems   Constitutional: Negative for " "chills and fever.   Skin: Positive for itching and rash.   All other systems reviewed and are negative.       Objective:     A focused cutaneous exam was completed including: hair, ears, face, eyelids, conjunctiva, lips, neck with the following pertinent findings listed below. Remaining above-listed examined areas within normal limits / negative for rashes or lesions.    Temperature 37.1 °C (98.7 °F), height 1.702 m (5' 7\"), weight 90.7 kg (200 lb), last menstrual period 10/06/2010.    Physical Exam   Constitutional: She is oriented to person, place, and time and well-developed, well-nourished, and in no distress.   HENT:   Head: Normocephalic and atraumatic.       Eyes: Conjunctivae and lids are normal.   Neck: Normal range of motion.   Pulmonary/Chest: Effort normal.   Neurological: She is alert and oriented to person, place, and time.   Skin: Skin is warm and dry.   Psychiatric: Mood and affect normal.   Vitals reviewed.      DATA: none applicable to review    Assessment and Plan:     1. Perioral dermatitis  1. 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 Instructed to take with food & water. Side effects including, but not limited to, GI upset, photosensitivity (and need for photoprotection) discussed.   -- instructed to start metro gel 0.75% gel daily to affected area on the skin  - recommended good OTC moisturizing products (neutrogenia hydro boost hydrating serum)  - discussed importance of regular sun protection/sunscreen use, SPF 30 or greater with broad spectrum coverage, need for reapplication every  minutes  - trigger avoidance    Followup: Return in about 3 months (around 9/14/2018).    Elida Tracy M.D.        "

## 2018-06-18 DIAGNOSIS — E03.9 HYPOTHYROIDISM, UNSPECIFIED TYPE: ICD-10-CM

## 2018-06-19 RX ORDER — LEVOTHYROXINE SODIUM 50 MCG
TABLET ORAL
Qty: 180 TAB | Refills: 3 | Status: SHIPPED | OUTPATIENT
Start: 2018-06-19 | End: 2019-07-18 | Stop reason: SDUPTHER

## 2018-06-20 DIAGNOSIS — F34.1 DYSTHYMIC DISORDER: Chronic | ICD-10-CM

## 2018-06-20 RX ORDER — ESCITALOPRAM OXALATE 20 MG/1
TABLET ORAL
Qty: 45 TAB | Refills: 3 | Status: SHIPPED | OUTPATIENT
Start: 2018-06-20 | End: 2019-03-08

## 2018-10-05 ENCOUNTER — HOSPITAL ENCOUNTER (OUTPATIENT)
Dept: LAB | Facility: MEDICAL CENTER | Age: 48
End: 2018-10-05
Attending: NURSE PRACTITIONER
Payer: COMMERCIAL

## 2018-10-05 ENCOUNTER — OFFICE VISIT (OUTPATIENT)
Dept: INTERNAL MEDICINE | Facility: IMAGING CENTER | Age: 48
End: 2018-10-05
Payer: COMMERCIAL

## 2018-10-05 ENCOUNTER — HOSPITAL ENCOUNTER (OUTPATIENT)
Dept: LAB | Facility: MEDICAL CENTER | Age: 48
End: 2018-10-05
Attending: SPECIALIST
Payer: COMMERCIAL

## 2018-10-05 VITALS
WEIGHT: 223 LBS | HEART RATE: 67 BPM | DIASTOLIC BLOOD PRESSURE: 90 MMHG | RESPIRATION RATE: 14 BRPM | OXYGEN SATURATION: 95 % | BODY MASS INDEX: 35 KG/M2 | TEMPERATURE: 98.2 F | HEIGHT: 67 IN | SYSTOLIC BLOOD PRESSURE: 158 MMHG

## 2018-10-05 DIAGNOSIS — I10 ESSENTIAL HYPERTENSION: Chronic | ICD-10-CM

## 2018-10-05 DIAGNOSIS — F51.01 PRIMARY INSOMNIA: ICD-10-CM

## 2018-10-05 LAB
ALBUMIN SERPL BCP-MCNC: 4.3 G/DL (ref 3.2–4.9)
ALBUMIN/GLOB SERPL: 1.4 G/DL
ALP SERPL-CCNC: 82 U/L (ref 30–99)
ALT SERPL-CCNC: 18 U/L (ref 2–50)
ANION GAP SERPL CALC-SCNC: 6 MMOL/L (ref 0–11.9)
AST SERPL-CCNC: 25 U/L (ref 12–45)
BASOPHILS # BLD AUTO: 0.9 % (ref 0–1.8)
BASOPHILS # BLD: 0.03 K/UL (ref 0–0.12)
BILIRUB SERPL-MCNC: 0.6 MG/DL (ref 0.1–1.5)
BUN SERPL-MCNC: 13 MG/DL (ref 8–22)
CALCIUM SERPL-MCNC: 9.7 MG/DL (ref 8.5–10.5)
CHLORIDE SERPL-SCNC: 105 MMOL/L (ref 96–112)
CO2 SERPL-SCNC: 27 MMOL/L (ref 20–33)
CREAT SERPL-MCNC: 0.89 MG/DL (ref 0.5–1.4)
EOSINOPHIL # BLD AUTO: 0.25 K/UL (ref 0–0.51)
EOSINOPHIL NFR BLD: 7.2 % (ref 0–6.9)
ERYTHROCYTE [DISTWIDTH] IN BLOOD BY AUTOMATED COUNT: 42.8 FL (ref 35.9–50)
ESTRADIOL SERPL-MCNC: 91 PG/ML
GLOBULIN SER CALC-MCNC: 3.1 G/DL (ref 1.9–3.5)
GLUCOSE SERPL-MCNC: 97 MG/DL (ref 65–99)
HCT VFR BLD AUTO: 49 % (ref 37–47)
HGB BLD-MCNC: 16.3 G/DL (ref 12–16)
IMM GRANULOCYTES # BLD AUTO: 0.01 K/UL (ref 0–0.11)
IMM GRANULOCYTES NFR BLD AUTO: 0.3 % (ref 0–0.9)
LYMPHOCYTES # BLD AUTO: 1.01 K/UL (ref 1–4.8)
LYMPHOCYTES NFR BLD: 29.2 % (ref 22–41)
MCH RBC QN AUTO: 29.8 PG (ref 27–33)
MCHC RBC AUTO-ENTMCNC: 33.3 G/DL (ref 33.6–35)
MCV RBC AUTO: 89.6 FL (ref 81.4–97.8)
MONOCYTES # BLD AUTO: 0.3 K/UL (ref 0–0.85)
MONOCYTES NFR BLD AUTO: 8.7 % (ref 0–13.4)
NEUTROPHILS # BLD AUTO: 1.86 K/UL (ref 2–7.15)
NEUTROPHILS NFR BLD: 53.7 % (ref 44–72)
NRBC # BLD AUTO: 0 K/UL
NRBC BLD-RTO: 0 /100 WBC
PLATELET # BLD AUTO: 189 K/UL (ref 164–446)
PMV BLD AUTO: 12.2 FL (ref 9–12.9)
POTASSIUM SERPL-SCNC: 3.9 MMOL/L (ref 3.6–5.5)
PROT SERPL-MCNC: 7.4 G/DL (ref 6–8.2)
RBC # BLD AUTO: 5.47 M/UL (ref 4.2–5.4)
SODIUM SERPL-SCNC: 138 MMOL/L (ref 135–145)
WBC # BLD AUTO: 3.5 K/UL (ref 4.8–10.8)

## 2018-10-05 PROCEDURE — 80053 COMPREHEN METABOLIC PANEL: CPT

## 2018-10-05 PROCEDURE — 82670 ASSAY OF TOTAL ESTRADIOL: CPT

## 2018-10-05 PROCEDURE — 84403 ASSAY OF TOTAL TESTOSTERONE: CPT

## 2018-10-05 PROCEDURE — 85025 COMPLETE CBC W/AUTO DIFF WBC: CPT

## 2018-10-05 PROCEDURE — 99214 OFFICE O/P EST MOD 30 MIN: CPT | Performed by: FAMILY MEDICINE

## 2018-10-05 PROCEDURE — 84270 ASSAY OF SEX HORMONE GLOBUL: CPT

## 2018-10-05 RX ORDER — LISINOPRIL 10 MG/1
10 TABLET ORAL DAILY
Qty: 30 TAB | Refills: 6 | Status: SHIPPED | OUTPATIENT
Start: 2018-10-05 | End: 2018-12-26

## 2018-10-05 ASSESSMENT — PATIENT HEALTH QUESTIONNAIRE - PHQ9
7. TROUBLE CONCENTRATING ON THINGS, SUCH AS READING THE NEWSPAPER OR WATCHING TELEVISION: NOT AT ALL
4. FEELING TIRED OR HAVING LITTLE ENERGY: NOT AT ALL
8. MOVING OR SPEAKING SO SLOWLY THAT OTHER PEOPLE COULD HAVE NOTICED. OR THE OPPOSITE, BEING SO FIGETY OR RESTLESS THAT YOU HAVE BEEN MOVING AROUND A LOT MORE THAN USUAL: NOT AT ALL
6. FEELING BAD ABOUT YOURSELF - OR THAT YOU ARE A FAILURE OR HAVE LET YOURSELF OR YOUR FAMILY DOWN: NOT AL ALL
SUM OF ALL RESPONSES TO PHQ QUESTIONS 1-9: 0
SUM OF ALL RESPONSES TO PHQ9 QUESTIONS 1 AND 2: 0
3. TROUBLE FALLING OR STAYING ASLEEP OR SLEEPING TOO MUCH: NOT AT ALL
5. POOR APPETITE OR OVEREATING: NOT AT ALL
9. THOUGHTS THAT YOU WOULD BE BETTER OFF DEAD, OR OF HURTING YOURSELF: NOT AT ALL
2. FEELING DOWN, DEPRESSED, IRRITABLE, OR HOPELESS: NOT AT ALL
1. LITTLE INTEREST OR PLEASURE IN DOING THINGS: NOT AT ALL

## 2018-10-07 PROBLEM — F51.01 PRIMARY INSOMNIA: Status: ACTIVE | Noted: 2018-10-07

## 2018-10-08 NOTE — PROGRESS NOTES
Chief Complaint   Patient presents with   • Insomnia   • Blood Pressure Problem       HPI:  Patient is a 48 y.o. female established patient who presents today to discuss ongoing issues with primary insomnia and blood pressure control. She is transitioning to a new job later this month and has associated stress that is causing inability to fall asleep consistently. She prefers to avoid prescription sleep medication and is open to OTC options. She also feels that her lack of consistent sleep and stress are also causing her average blood pressure to rise. Her home blood pressures have been averaging 140s-150s/85 and has been on ramipril in the past. Dr. Chavarria discontinued the ramipril due to stable blood pressure control off medication. She reports stable mood in lexapro 30 mg daily for chronic depression and denies other new medical conditions at this time.     Patient Active Problem List    Diagnosis Date Noted   • Primary insomnia 10/07/2018   • Rheumatoid arthritis involving multiple sites with positive rheumatoid factor (HCC) 05/17/2017   • Obesity (BMI 35.0-39.9 without comorbidity) 11/11/2016   • Essential hypertension 11/11/2016   • Family history of stroke 04/13/2016   • Hypothyroidism 08/28/2015   • Heterozygous MTHFR mutation [id number] ([facility]) 11/07/2014   • Heterozygous MTHFR mutation C677T ([facility]) 11/07/2014   • Elevated homocysteine ([facility]) 11/07/2014   • Gluten intolerance 05/16/2014   • Mixed hyperlipidemia with apolipoprotein E4 variant 05/16/2014   • Vitamin D deficiency disease 05/16/2014   • GERD (gastroesophageal reflux disease)    • Polycystic ovaries    • Dysthymic disorder 11/07/2012   • Asthma 11/07/2012   • Premature surgical menopause on HRT 11/07/2012   • Dry eyes 11/07/2012     Past medical, surgical, family, and social history was reviewed in Epic chart by me today.     Medications and allergies reviewed and updated in Epic chart by me today.     ROS:  Pertinent positives listed above in HPI. All  "other systems have been reviewed and are negative.    PE:   /90 (BP Location: Left arm, Patient Position: Sitting, BP Cuff Size: Large adult)   Pulse 67   Temp 36.8 °C (98.2 °F) (Temporal)   Resp 14   Ht 1.702 m (5' 7\")   Wt 101.2 kg (223 lb)   LMP 10/06/2010   SpO2 95%   BMI 34.93 kg/m²   Vital signs reviewed with patient.     Gen: Well developed; well nourished; no acute distress; age appropriate appearance   CV: Regular rate and rhythm; S1/ S2 present; no murmur, gallop or rub noted  Pulm: No respiratory distress; clear to ascultation b/l; no wheezing or stridor noted b/l  Abd: Adequate bowel sounds noted; soft and nontender; no rebound, rigidity, nor distention; obese  Extremities: No peripheral edema b/l LE extremities/ no clubbing nor cyanosis noted  Skin: Warm and dry; no rashes noted   Neuro: No focal deficits noted   Psych: AAOx4; mood and affect are appropriate    A/P:  1. Essential hypertension  Uncontrolled without medication use; recommend patient start lisinopril daily and continue daily home blood pressure log. Target BP < 130/80. New RX sent to pharmacy.  - lisinopril (PRINIVIL) 10 MG Tab; Take 1 Tab by mouth every day.  Dispense: 30 Tab; Refill: 6    2. Primary insomnia  Uncontrolled; Recommend that patient try nightly melatonin for at least seven nights to saturate receptors and can add qhs prn benadryl if additional sleep support required.     Pt is to contact our office if her current conditions do not improve with treatment plans detailed above.     "

## 2018-10-11 LAB
SHBG SERPL-SCNC: 45 NMOL/L (ref 30–135)
TESTOST FREE SERPL-MCNC: 52.3 PG/ML (ref 1.1–5.8)
TESTOST SERPL-MCNC: 345 NG/DL (ref 9–55)

## 2018-10-23 ENCOUNTER — OFFICE VISIT (OUTPATIENT)
Dept: DERMATOLOGY | Facility: IMAGING CENTER | Age: 48
End: 2018-10-23
Payer: COMMERCIAL

## 2018-10-23 DIAGNOSIS — L71.0 PERIORAL DERMATITIS: ICD-10-CM

## 2018-10-23 PROCEDURE — 99213 OFFICE O/P EST LOW 20 MIN: CPT | Performed by: DERMATOLOGY

## 2018-10-23 RX ORDER — DOXYCYCLINE 40 MG/1
40 CAPSULE ORAL EVERY MORNING
Qty: 30 CAP | Refills: 3 | Status: SHIPPED | OUTPATIENT
Start: 2018-10-23 | End: 2019-04-24 | Stop reason: SDUPTHER

## 2018-10-23 RX ORDER — METRONIDAZOLE 7.5 MG/G
1 GEL TOPICAL 2 TIMES DAILY
Qty: 1 TUBE | Refills: 3 | Status: SHIPPED | OUTPATIENT
Start: 2018-10-23 | End: 2019-01-21

## 2018-10-23 ASSESSMENT — ENCOUNTER SYMPTOMS
CHILLS: 0
FEVER: 0

## 2018-10-23 NOTE — PROGRESS NOTES
Dermatology Return Patient Visit    Chief Complaint   Patient presents with   • Rosacea       Subjective:     HPI:   Soraida Fregoso is a 48 y.o. female presenting for    Follow up perioral dermatitis  Has improved   Currently on doxy 100mg daily, using metrogel daily  No new bumps in several weeks    History  HPI: rash on the chin   Onset: ~1 year ago  Red, bumpy, very itchy  Aggravating factors: none , no known life stressors  Alleviating factors: no  New creams/topicals: none prior to onset of symptoms (no occlusive creams), no other exposures  Prior treatments: triamcinolone 0.1%        Past Medical History:   Diagnosis Date   • Anemia     improved   • Anesthesia     N & V   • ASTHMA    • Depression    • GERD (gastroesophageal reflux disease)    • Grave's disease 8/2010   • Heart burn    • Hiatus hernia syndrome    • Hypothyroid    • Overweight(278.02)    • Polycystic ovaries    • rheumatoid 2000    rheumatoid       Current Outpatient Prescriptions on File Prior to Visit   Medication Sig Dispense Refill   • lisinopril (PRINIVIL) 10 MG Tab Take 1 Tab by mouth every day. 30 Tab 6   • escitalopram (LEXAPRO) 20 MG tablet Take 1/2 tablet by mouth daily. (Patient taking differently: 30 mg every day. Take 1.5 tablets daily) 45 Tab 3   • SYNTHROID 50 MCG Tab TAKE TWO TABLETS BY MOUTH DAILY 180 Tab 3   • doxycycline (VIBRAMYCIN) 100 MG Cap With food and water, twice daily for 6 weeks, then decrease to daily 150 Cap 2   • furosemide (LASIX) 20 MG Tab Take 2 Tabs by mouth every day. 60 Tab 6   • potassium chloride SA (KDUR) 20 MEQ Tab CR Take 2 Tabs by mouth every day. (when taking lasix tablets) 60 Tab 6   • mometasone (NASONEX) 50 MCG/ACT nasal spray Spray 2 Sprays in nose every day. 3 Inhaler 4   • levalbuterol (XOPENEX) 0.63 MG/3ML Nebu Soln 3 mL by Nebulization route every 8 hours as needed. 24 mL 3   • TESTOSTERONE TD Apply  to skin as directed. Indications: compounded cream - 1/4 tsp applied to thigh daily     •  "albuterol 108 (90 BASE) MCG/ACT Aero Soln inhalation aerosol Inhale 2 Puffs by mouth every 6 hours as needed for Shortness of Breath. 8.5 g 12   • scopolamine (TRANSDERM-SCOP) 1.5 MG/3DAYS PATCH 72 HR Apply 1 Patch to skin as directed every 72 hours. 4 Patch 3   • Evening Primrose Oil 500 MG Cap Take 1 Cap by mouth 2 Times a Day.     • Estradiol (EVAMIST) 1.53 MG/SPRAY SOLN Apply 1-3 Sprays to skin as directed every day. 1 Bottle 0   • L-Methylfolate 1 MG TABS Take 1 Tab by mouth every day. Solgar - Metafolin     • leflunomide (ARAVA) 20 MG TABS Take 1 Tab by mouth every day. 90 Tab 3   • Cholecalciferol (VITAMIN D) 2000 UNITS CAPS Take 1 Cap by mouth every day. 30 Cap    • Fish Oil OIL 2 Each by Does not apply route every day. Nature Made Fish Oil [place] 3s EPA and DHA. 227 mg Fish Oil, Omega 3 57 mg, Omega 3 DHA 47.5 mg, Omega 3 EPA 9.5 mg     • multivitamin (THERAGRAN) per tablet Take 1 Tab by mouth every day.     • cyclosporin (RESTASIS) 0.05 % ophthalmic emulsion Place 1 Drop in both eyes 2 times a day. 1 Each 3   • beclomethasone (QVAR) 80 MCG/ACT inhaler Inhale 1 Puff by mouth every day.       No current facility-administered medications on file prior to visit.        Allergies   Allergen Reactions   • Sulfa Drugs Swelling   • Amoxicillin Rash       Family History   Problem Relation Age of Onset   • Stroke Father 41   • Heart Disease Father         \"hole\" in heart causing stroke   • Alcohol/Drug Brother    • Arthritis Sister         rheumatoid-severe       Social History     Social History   • Marital status:      Spouse name: N/A   • Number of children: N/A   • Years of education: N/A     Occupational History   • Not on file.     Social History Main Topics   • Smoking status: Never Smoker   • Smokeless tobacco: Never Used   • Alcohol use No      Comment: twice ayear   • Drug use: No   • Sexual activity: Yes     Partners: Male     Other Topics Concern   • Not on file     Social History Narrative   • No " narrative on file       Review of Systems   Constitutional: Negative for chills and fever.   Skin: Negative for itching and rash.   All other systems reviewed and are negative.       Objective:     A focused cutaneous exam was completed including: hair, ears, face, eyelids, conjunctiva, lips, neck, upper chest with the following pertinent findings listed below. Remaining above-listed examined areas within normal limits / negative for rashes or lesions.    Physical Exam   Constitutional: She is oriented to person, place, and time and well-developed, well-nourished, and in no distress.   HENT:   Head: Normocephalic and atraumatic.       Eyes: Conjunctivae and lids are normal.   Neck: Normal range of motion.   Pulmonary/Chest: Effort normal.   Neurological: She is alert and oriented to person, place, and time.   Skin: Skin is warm and dry.   Psychiatric: Mood and affect normal.       DATA: none applicable to review    Assessment and Plan:     1. Perioral dermatitis  - discussed continued management options, and expectations of treatment  - d/c doxy 100, start Oracea 40mg daily; take with food and water; s/e GI upset sun sensitivity discussed  - continue metro gel 0.75% gel daily to affected area on the skin  - recommended good OTC moisturizing products (neutrogenia hydro boost hydrating serum)  - discussed importance of regular sun protection/sunscreen use, SPF 30 or greater with broad spectrum coverage, need for reapplication every  minutes  - trigger avoidance    Followup: Return in about 4 months (around 2/23/2019).    Elida Tracy M.D.

## 2018-10-30 DIAGNOSIS — I10 ESSENTIAL HYPERTENSION: ICD-10-CM

## 2018-10-30 RX ORDER — LOSARTAN POTASSIUM 50 MG/1
50 TABLET ORAL DAILY
Qty: 30 TAB | Refills: 2 | Status: SHIPPED | OUTPATIENT
Start: 2018-10-30 | End: 2018-12-26 | Stop reason: SDUPTHER

## 2018-12-20 ENCOUNTER — TELEPHONE (OUTPATIENT)
Dept: INTERNAL MEDICINE | Facility: IMAGING CENTER | Age: 48
End: 2018-12-20

## 2018-12-21 NOTE — TELEPHONE ENCOUNTER
Soraida states that the Lexapro 30 mg is not helping her.  She wants to know if you can increase the Lexapro.

## 2018-12-26 ENCOUNTER — OFFICE VISIT (OUTPATIENT)
Dept: INTERNAL MEDICINE | Facility: IMAGING CENTER | Age: 48
End: 2018-12-26
Payer: COMMERCIAL

## 2018-12-26 VITALS
HEART RATE: 71 BPM | BODY MASS INDEX: 35.31 KG/M2 | OXYGEN SATURATION: 95 % | HEIGHT: 67 IN | DIASTOLIC BLOOD PRESSURE: 80 MMHG | WEIGHT: 225 LBS | TEMPERATURE: 97.7 F | RESPIRATION RATE: 14 BRPM | SYSTOLIC BLOOD PRESSURE: 130 MMHG

## 2018-12-26 DIAGNOSIS — I10 ESSENTIAL HYPERTENSION: ICD-10-CM

## 2018-12-26 DIAGNOSIS — F41.9 ANXIETY: ICD-10-CM

## 2018-12-26 DIAGNOSIS — F34.1 DYSTHYMIC DISORDER: Chronic | ICD-10-CM

## 2018-12-26 PROCEDURE — 99214 OFFICE O/P EST MOD 30 MIN: CPT | Performed by: FAMILY MEDICINE

## 2018-12-26 RX ORDER — VENLAFAXINE HYDROCHLORIDE 75 MG/1
75 CAPSULE, EXTENDED RELEASE ORAL DAILY
Qty: 30 CAP | Refills: 3 | Status: SHIPPED | OUTPATIENT
Start: 2018-12-26 | End: 2019-03-08 | Stop reason: SDUPTHER

## 2018-12-26 RX ORDER — VENLAFAXINE HYDROCHLORIDE 37.5 MG/1
37.5 CAPSULE, EXTENDED RELEASE ORAL DAILY
Qty: 15 CAP | Refills: 0 | Status: SHIPPED | OUTPATIENT
Start: 2018-12-26 | End: 2019-01-10

## 2018-12-26 RX ORDER — LOSARTAN POTASSIUM 50 MG/1
50 TABLET ORAL DAILY
Qty: 90 TAB | Refills: 3 | Status: SHIPPED | OUTPATIENT
Start: 2018-12-26 | End: 2020-02-25

## 2018-12-27 NOTE — PROGRESS NOTES
"Chief Complaint   Patient presents with   • Depression   • Anxiety       HPI:  Patient is a 48 y.o. female established patient who presents today to discuss ongoing concerns about daily lexapro not effectively controlling chronic dysthymia/ anxiety any further. She has a long standing history of not tolerating dysthymia/ anxiety medications long term:  Lexapro: increased both conditions over time  Celexa: increased both conditions over time  Paxil and Prozac: increased both conditions and increased sugar cravings over time  Wellbutrin: caused her to \"zone out\" and become dysfunctional  Zoloft: she cannot recall response but does not feel that it worked  Effexor: short term use in past without known side effects  She reports having increased anxiety in the morning and has been taking qhs melatonin 5 mg with improved sleep habits. She does not feel unsafe at this time and but remains motivated to help even out her symptomatology. She is not interested in a psychiatry referral at this time but is willing to trial another medication to help her. She is also interested in having her lap band removed and going through VGS surgery within the next year to control chronic obesity. She is also hoping that the surgery will help improve her other chronic medical conditions. She endorses 100% medication compliance at this time and remains busy with work and family commitments.     Patient Active Problem List    Diagnosis Date Noted   • Primary insomnia 10/07/2018   • Rheumatoid arthritis involving multiple sites with positive rheumatoid factor ([facility]) 05/17/2017   • Obesity (BMI 35.0-39.9 without comorbidity) 11/11/2016   • Essential hypertension 11/11/2016   • Family history of stroke 04/13/2016   • Hypothyroidism 08/28/2015   • Heterozygous MTHFR mutation [id number] ([facility]) 11/07/2014   • Heterozygous MTHFR mutation C677T ([facility]) 11/07/2014   • Elevated homocysteine ([facility]) 11/07/2014   • Gluten intolerance 05/16/2014   • Mixed " "hyperlipidemia with apolipoprotein E4 variant 05/16/2014   • Vitamin D deficiency disease 05/16/2014   • GERD (gastroesophageal reflux disease)    • Polycystic ovaries    • Dysthymic disorder 11/07/2012   • Asthma 11/07/2012   • Premature surgical menopause on HRT 11/07/2012   • Dry eyes 11/07/2012       Past medical, surgical, family, and social history was reviewed and updated in Epic chart by me today.     Medications and allergies reviewed and updated in Epic chart by me today.     ROS:  Pertinent positives listed above in HPI. All other systems have been reviewed and are negative.    PE:   /80 (BP Location: Left arm, Patient Position: Sitting)   Pulse 71   Temp 36.5 °C (97.7 °F) (Temporal)   Resp 14   Ht 1.702 m (5' 7\")   Wt 102.1 kg (225 lb)   LMP 10/06/2010   SpO2 95%   BMI 35.24 kg/m²   Vital signs reviewed with patient.     Gen: Well developed; well nourished; no acute distress; age appropriate appearance   Skin: Warm and dry; no rashes noted   Neuro: No focal deficits noted   Psych: AAOx4; mood and affect are calm and interactive    A/P:  1. Dysthymic disorder  Long discussion with patient regarding treatment options available to her. Please refer to HPI for further details of prior experiences with medications to treat dysthymia. She is not interested in psych referral at this time and will trial Effexor XR. She would like to start with low dose and slow increase over two week period of time. No safety concerns present at this time. New prescriptions sent to pharmacy today for patient.   - venlafaxine XR (EFFEXOR XR) 37.5 MG CAPSULE SR 24 HR; Take 1 Cap by mouth every day for 15 days.  Dispense: 15 Cap; Refill: 0  - venlafaxine XR (EFFEXOR XR) 75 MG CAPSULE SR 24 HR; Take 1 Cap by mouth every day. (Start after completion of 37.5 mg prescription).  Dispense: 30 Cap; Refill: 3    2. Essential hypertension  Stable/ pt is to continue daily losartan (did not tolerate lisinopril use well). New " prescription sent to her pharmacy.   - losartan (COZAAR) 50 MG Tab; Take 1 Tab by mouth every day.  Dispense: 90 Tab; Refill: 3    3. Anxiety  Multi factorial condition for patient - we discussed non pharmacological ways of treating this condition including adaptogen use, increase exercise, good sleep hygiene to help with anxiety control. She has used xanax in past and does not want to use this drug class in future (caused headaches/ addiction potential).    Face to face time: 28 minutes spent with greater than 75% of time spent with direct patient care and counseling about diagnosis, prognosis, risk versus benefits of treatment, and importance of compliance with instructions. All questions answered and support provided during visit today.   Pt is to contact me 24/7 if current condition changes or she feels unsafe and is to follow up with me within the next 4-8 weeks for medication evaluation appointment.

## 2019-01-14 ENCOUNTER — TELEPHONE (OUTPATIENT)
Dept: INTERNAL MEDICINE | Facility: IMAGING CENTER | Age: 49
End: 2019-01-14

## 2019-01-14 NOTE — TELEPHONE ENCOUNTER
Soraida called regarding a recent death in the family, her niece committed suicide earlier today.  She would like advise on increasing Effexor.  Dr Cuba please call.

## 2019-01-15 NOTE — TELEPHONE ENCOUNTER
Spoke with Soraida regarding her current Effexor use (currently taking 75 mg daily for the past 2-3 weeks). She is grieving from the suicide death of a family member today and is wondering if medication should be increased. I recommend that she continue with her current dose daily at this time and follow up with me as needed. She has an appointment scheduled for Friday with me, and she will determine later this week whether to keep it or reschedule.

## 2019-02-25 ENCOUNTER — HOSPITAL ENCOUNTER (OUTPATIENT)
Dept: LAB | Facility: MEDICAL CENTER | Age: 49
End: 2019-02-25
Attending: SPECIALIST
Payer: COMMERCIAL

## 2019-02-25 ENCOUNTER — HOSPITAL ENCOUNTER (OUTPATIENT)
Dept: LAB | Facility: MEDICAL CENTER | Age: 49
End: 2019-02-25
Attending: NURSE PRACTITIONER
Payer: COMMERCIAL

## 2019-02-25 LAB
ALBUMIN SERPL BCP-MCNC: 4.5 G/DL (ref 3.2–4.9)
ALBUMIN/GLOB SERPL: 1.7 G/DL
ALP SERPL-CCNC: 88 U/L (ref 30–99)
ALT SERPL-CCNC: 25 U/L (ref 2–50)
ANION GAP SERPL CALC-SCNC: 8 MMOL/L (ref 0–11.9)
AST SERPL-CCNC: 26 U/L (ref 12–45)
BASOPHILS # BLD AUTO: 0.9 % (ref 0–1.8)
BASOPHILS # BLD: 0.05 K/UL (ref 0–0.12)
BILIRUB SERPL-MCNC: 0.4 MG/DL (ref 0.1–1.5)
BUN SERPL-MCNC: 20 MG/DL (ref 8–22)
CALCIUM SERPL-MCNC: 10.2 MG/DL (ref 8.5–10.5)
CHLORIDE SERPL-SCNC: 108 MMOL/L (ref 96–112)
CO2 SERPL-SCNC: 26 MMOL/L (ref 20–33)
CREAT SERPL-MCNC: 0.78 MG/DL (ref 0.5–1.4)
CRP SERPL HS-MCNC: 0.02 MG/DL (ref 0–0.75)
EOSINOPHIL # BLD AUTO: 0.31 K/UL (ref 0–0.51)
EOSINOPHIL NFR BLD: 5.7 % (ref 0–6.9)
ERYTHROCYTE [DISTWIDTH] IN BLOOD BY AUTOMATED COUNT: 43.2 FL (ref 35.9–50)
ERYTHROCYTE [SEDIMENTATION RATE] IN BLOOD BY WESTERGREN METHOD: 3 MM/HOUR (ref 0–20)
GLOBULIN SER CALC-MCNC: 2.6 G/DL (ref 1.9–3.5)
GLUCOSE SERPL-MCNC: 94 MG/DL (ref 65–99)
HCT VFR BLD AUTO: 47.1 % (ref 37–47)
HGB BLD-MCNC: 15.2 G/DL (ref 12–16)
IMM GRANULOCYTES # BLD AUTO: 0.01 K/UL (ref 0–0.11)
IMM GRANULOCYTES NFR BLD AUTO: 0.2 % (ref 0–0.9)
LYMPHOCYTES # BLD AUTO: 2.17 K/UL (ref 1–4.8)
LYMPHOCYTES NFR BLD: 40 % (ref 22–41)
MCH RBC QN AUTO: 29.6 PG (ref 27–33)
MCHC RBC AUTO-ENTMCNC: 32.3 G/DL (ref 33.6–35)
MCV RBC AUTO: 91.8 FL (ref 81.4–97.8)
MONOCYTES # BLD AUTO: 0.46 K/UL (ref 0–0.85)
MONOCYTES NFR BLD AUTO: 8.5 % (ref 0–13.4)
NEUTROPHILS # BLD AUTO: 2.42 K/UL (ref 2–7.15)
NEUTROPHILS NFR BLD: 44.7 % (ref 44–72)
NRBC # BLD AUTO: 0 K/UL
NRBC BLD-RTO: 0 /100 WBC
PLATELET # BLD AUTO: 221 K/UL (ref 164–446)
PMV BLD AUTO: 11.4 FL (ref 9–12.9)
POTASSIUM SERPL-SCNC: 3.9 MMOL/L (ref 3.6–5.5)
PROT SERPL-MCNC: 7.1 G/DL (ref 6–8.2)
RBC # BLD AUTO: 5.13 M/UL (ref 4.2–5.4)
SODIUM SERPL-SCNC: 142 MMOL/L (ref 135–145)
WBC # BLD AUTO: 5.4 K/UL (ref 4.8–10.8)

## 2019-02-25 PROCEDURE — 84403 ASSAY OF TOTAL TESTOSTERONE: CPT

## 2019-02-25 PROCEDURE — 36415 COLL VENOUS BLD VENIPUNCTURE: CPT

## 2019-02-25 PROCEDURE — 80053 COMPREHEN METABOLIC PANEL: CPT

## 2019-02-25 PROCEDURE — 85025 COMPLETE CBC W/AUTO DIFF WBC: CPT

## 2019-02-25 PROCEDURE — 86140 C-REACTIVE PROTEIN: CPT

## 2019-02-25 PROCEDURE — 85652 RBC SED RATE AUTOMATED: CPT

## 2019-02-25 PROCEDURE — 84270 ASSAY OF SEX HORMONE GLOBUL: CPT

## 2019-03-02 LAB
SHBG SERPL-SCNC: 65 NMOL/L (ref 30–135)
TESTOST FREE SERPL-MCNC: 2.1 PG/ML (ref 1.1–5.8)
TESTOST SERPL-MCNC: 19 NG/DL (ref 9–55)

## 2019-03-08 ENCOUNTER — OFFICE VISIT (OUTPATIENT)
Dept: INTERNAL MEDICINE | Facility: IMAGING CENTER | Age: 49
End: 2019-03-08
Payer: COMMERCIAL

## 2019-03-08 VITALS
HEIGHT: 67 IN | RESPIRATION RATE: 16 BRPM | BODY MASS INDEX: 36.47 KG/M2 | WEIGHT: 232.4 LBS | TEMPERATURE: 98 F | SYSTOLIC BLOOD PRESSURE: 138 MMHG | HEART RATE: 77 BPM | OXYGEN SATURATION: 95 % | DIASTOLIC BLOOD PRESSURE: 90 MMHG

## 2019-03-08 DIAGNOSIS — Z00.00 WELLNESS EXAMINATION: ICD-10-CM

## 2019-03-08 DIAGNOSIS — Z79.890 PREMATURE SURGICAL MENOPAUSE ON HRT: Chronic | ICD-10-CM

## 2019-03-08 DIAGNOSIS — E66.9 OBESITY (BMI 35.0-39.9 WITHOUT COMORBIDITY): Chronic | ICD-10-CM

## 2019-03-08 DIAGNOSIS — E55.9 VITAMIN D DEFICIENCY DISEASE: Chronic | ICD-10-CM

## 2019-03-08 DIAGNOSIS — E03.9 ACQUIRED HYPOTHYROIDISM: Chronic | ICD-10-CM

## 2019-03-08 DIAGNOSIS — F51.01 PRIMARY INSOMNIA: ICD-10-CM

## 2019-03-08 DIAGNOSIS — I10 ESSENTIAL HYPERTENSION: Chronic | ICD-10-CM

## 2019-03-08 DIAGNOSIS — Z15.89 HETEROZYGOUS MTHFR MUTATION C677T: ICD-10-CM

## 2019-03-08 DIAGNOSIS — E89.40 PREMATURE SURGICAL MENOPAUSE ON HRT: Chronic | ICD-10-CM

## 2019-03-08 DIAGNOSIS — M05.79 RHEUMATOID ARTHRITIS INVOLVING MULTIPLE SITES WITH POSITIVE RHEUMATOID FACTOR (HCC): Chronic | ICD-10-CM

## 2019-03-08 DIAGNOSIS — F34.1 DYSTHYMIC DISORDER: Chronic | ICD-10-CM

## 2019-03-08 DIAGNOSIS — E78.2 MIXED HYPERLIPIDEMIA WITH APOLIPOPROTEIN E4 VARIANT: Chronic | ICD-10-CM

## 2019-03-08 DIAGNOSIS — K21.9 GASTROESOPHAGEAL REFLUX DISEASE WITHOUT ESOPHAGITIS: Chronic | ICD-10-CM

## 2019-03-08 DIAGNOSIS — Z15.89 HETEROZYGOUS MTHFR MUTATION A1298C: ICD-10-CM

## 2019-03-08 DIAGNOSIS — Z12.31 ENCOUNTER FOR SCREENING MAMMOGRAM FOR BREAST CANCER: ICD-10-CM

## 2019-03-08 PROCEDURE — 99396 PREV VISIT EST AGE 40-64: CPT | Performed by: FAMILY MEDICINE

## 2019-03-08 RX ORDER — VENLAFAXINE HYDROCHLORIDE 75 MG/1
75 CAPSULE, EXTENDED RELEASE ORAL DAILY
Qty: 90 CAP | Refills: 3 | Status: SHIPPED | OUTPATIENT
Start: 2019-03-08 | End: 2019-04-15

## 2019-03-10 NOTE — PROGRESS NOTES
Chief Complaint   Patient presents with   • Fall     Back of head, fell on concrete. Lightheaded for a few days. Denies loss of consciousness. Took Ibuprofen and Ice therapy    • Stress     New job, more responsibilty.    • Annual Exam       HPI:  Patient is a 49 y.o. female established patient who presents today for her wellness exam. She reports that she fell backwards on the ice 2/26/19 and suffered a bump on the back of her head. She did not loss consciousness but had lightheadedness that has not resolved with supportive care measures. She has chronic dysthymia and is currently taking daily Effexor XR with good success. She is under great stress due to a job promotion with increased responsibility but feels better/ no safety concerns present. She has chronic insomnia that is controlled at this time and chronic asthma controlled on daily medications without recent flair. She suffers from chronic obesity and has gained 10 lbs - she is aware of the need to refocus on diet and exercise to prevent future health complications. She takes daily Synthroid to control chronic acquired hypothyroidism and is on HRT for premature surgical menopause (managed by GYN team). She also has chronic RA managed by Dr. De Luna and has upcoming medication changes to hopefully improve her condition control. She also has chronic vitamin D deficiency on daily supplementation and chronic GERD without reported symptoms currently. She endorses 100% medication compliance and is in good spirits at our visit today.    Patient Active Problem List    Diagnosis Date Noted   • Primary insomnia 10/07/2018   • Rheumatoid arthritis involving multiple sites with positive rheumatoid factor (HCC) 05/17/2017   • Obesity (BMI 35.0-39.9 without comorbidity) 11/11/2016   • Essential hypertension 11/11/2016   • Family history of stroke 04/13/2016   • Hypothyroidism 08/28/2015   • Heterozygous MTHFR mutation [id number] ([facility]) 11/07/2014   • Heterozygous MTHFR  "mutation C677T ([facility]) 11/07/2014   • Gluten intolerance 05/16/2014   • Mixed hyperlipidemia with apolipoprotein E4 variant 05/16/2014   • Vitamin D deficiency disease 05/16/2014   • GERD (gastroesophageal reflux disease)    • Polycystic ovaries    • Dysthymic disorder 11/07/2012   • Asthma 11/07/2012   • Premature surgical menopause on HRT 11/07/2012   • Dry eyes 11/07/2012       Past medical, surgical, family, and social history was reviewed and updated in Epic chart by me today.     Medications and allergies reviewed and updated in Epic chart by me today.     ROS:  Pertinent positives listed above in HPI. All other systems have been reviewed and are negative.    PE:   /90 (BP Location: Left arm, Patient Position: Sitting, BP Cuff Size: Adult)   Pulse 77   Temp 36.7 °C (98 °F) (Temporal)   Resp 16   Ht 1.702 m (5' 7\")   Wt 105.4 kg (232 lb 6.4 oz)   LMP 10/06/2010   SpO2 95%   BMI 36.40 kg/m²   Vital signs reviewed with patient.     Gen: Well developed; well nourished; no acute distress; age appropriate appearance   HEENT: Normocephalic; atraumatic; PEERLA b/l; sclera clear b/l; b/l external auditory canals WNL; b/l TM WNL; nares patent; oropharynx clear; oral mucosa moist; tongue midline; dentition adequate; small soft tissue bump (non tender) noted on right posterior scalp - occurred when she fell on the ice   Neck: No adenopathy; no thyromegaly  CV: Regular rate and rhythm; S1/ S2 present; no murmur, gallop or rub noted  Pulm: No respiratory distress; clear to ascultation b/l; no wheezing or stridor noted b/l  Abd: Adequate bowel sounds noted; soft and nontender; no rebound, rigidity, nor distention  Extremities: No peripheral edema b/l LE extremities/ no clubbing nor cyanosis noted  Skin: Warm and dry; no rashes noted   Neuro: No focal deficits noted   Psych: AAOx4; mood and affect are appropriate    A/P:  1. Dysthymic disorder  Improved since initiation of Effexor XR use but patient would like to " increase dose to 150 mg daily and follow clinical response. No safety concerns present. New RX sent to pharmacy.   - venlafaxine XR (EFFEXOR XR) 75 MG CAPSULE SR 24 HR; Take 1 Cap by mouth every day.  Dispense: 90 Cap; Refill: 3    2. Essential hypertension  Pt would benefit from increasing BP control - recommend patient increase Losartan use to 100 mg daily and continue home BP monitoring.     3. Gastroesophageal reflux disease without esophagitis  Stable at this time.     4. Heterozygous MTHFR mutation [id number] (HCC)  Stable/ pt is to continue daily L-methylfolate    5. Heterozygous MTHFR mutation C677T (HCC)  Stable/ pt is to continue daily L-methylfolate    6. Acquired hypothyroidism  Stable/ pt is to continue daily Synthroid.     7. Mixed hyperlipidemia with apolipoprotein E4 variant  Stable at this time and patient prefers not to use statins at this time.      8. Obesity (BMI 35.0-39.9 without comorbidity)  Ongoing issue for patient - we discussed this condition at length and potential strategies to help her on the weight loss journey. She had briefly considered VGS surgery but is currently not interested in this procedure.     9. Premature surgical menopause on HRT  Pt is resuming Evamist 2 pump daily and compounded T QOD managed by GYN team.    10. Primary insomnia  Stable    11. Rheumatoid arthritis involving multiple sites with positive rheumatoid factor (HCC)  Ongoing issue for patient - she is transitioning soon from Humira to Orencia and continuing Arava for increased symptom control managed by Dr. De Luna.     12. Vitamin D deficiency disease  Stable/ pt is to continue daily vitamin D supplementation.     13. Wellness examination  Pt is stable at this time and UTD with HCM items.     14. Encounter for screening mammogram for breast cancer  Annual screening mammogram will be due 3/3/0/19. Pt provided with number to call and schedule imaging appointment.   - MA-SCREENING MAMMO BILAT W/TOMOSYNTHESIS W/CAD;  Future    Pt is stable at this time and will keep me updated on her blood pressure and dysthymia control moving forward.

## 2019-03-18 DIAGNOSIS — J06.9 UPPER RESPIRATORY TRACT INFECTION, UNSPECIFIED TYPE: ICD-10-CM

## 2019-03-18 DIAGNOSIS — J30.89 OTHER ALLERGIC RHINITIS: ICD-10-CM

## 2019-03-18 RX ORDER — MOMETASONE FUROATE 50 UG/1
2 SPRAY, METERED NASAL DAILY
Qty: 3 INHALER | Refills: 4 | Status: SHIPPED | OUTPATIENT
Start: 2019-03-18 | End: 2020-04-08

## 2019-03-29 DIAGNOSIS — F34.1 DYSTHYMIC DISORDER: Chronic | ICD-10-CM

## 2019-03-29 RX ORDER — PAROXETINE HYDROCHLORIDE 20 MG/1
20 TABLET, FILM COATED ORAL DAILY
Qty: 30 TAB | Refills: 3 | Status: SHIPPED | OUTPATIENT
Start: 2019-03-29 | End: 2019-06-21

## 2019-04-15 ENCOUNTER — OFFICE VISIT (OUTPATIENT)
Dept: INTERNAL MEDICINE | Facility: IMAGING CENTER | Age: 49
End: 2019-04-15
Payer: COMMERCIAL

## 2019-04-15 VITALS
SYSTOLIC BLOOD PRESSURE: 141 MMHG | DIASTOLIC BLOOD PRESSURE: 79 MMHG | OXYGEN SATURATION: 97 % | TEMPERATURE: 97.8 F | HEIGHT: 67 IN | WEIGHT: 232 LBS | HEART RATE: 87 BPM | BODY MASS INDEX: 36.41 KG/M2 | RESPIRATION RATE: 15 BRPM

## 2019-04-15 DIAGNOSIS — Z79.890 PREMATURE SURGICAL MENOPAUSE ON HRT: Chronic | ICD-10-CM

## 2019-04-15 DIAGNOSIS — E89.40 PREMATURE SURGICAL MENOPAUSE ON HRT: Chronic | ICD-10-CM

## 2019-04-15 DIAGNOSIS — F34.1 DYSTHYMIC DISORDER: Chronic | ICD-10-CM

## 2019-04-15 PROCEDURE — 99213 OFFICE O/P EST LOW 20 MIN: CPT | Performed by: FAMILY MEDICINE

## 2019-04-15 NOTE — PROGRESS NOTES
"Chief Complaint   Patient presents with   • Medication Problem     Feels like she in on an \"emotional rollercoaster\". Meds do not feel like they are controlling her anxiety. Feels tremulous.        HPI:  Patient is a 49 y.o. female established patient who presents today to discuss ongoing concerns about chronic dysthymia not being well controlled. She reached out to me via email on April 13 about making a medication change to help her condition. She has taken Effexor XR and Paxil since December without effective control of symptoms, and situation has been compounded by suicide death of her young niece in January. The [facility] service was held yesterday, and Soraida gave the main eulogy. She does not feel that this loss or HRT restart last month have contributed to her recent increased sense of not feeling loved and being anxious at time, but I strongly feel otherwise. She has seen a counselor in the distant past but has not seen one within the past year. She denies safety concerns and has a very supportive family. She is open to making necessary changes to help her feel better overall.     Patient Active Problem List    Diagnosis Date Noted   • Primary insomnia 10/07/2018   • Rheumatoid arthritis involving multiple sites with positive rheumatoid factor ([facility]) 05/17/2017   • Obesity (BMI 35.0-39.9 without comorbidity) 11/11/2016   • Essential hypertension 11/11/2016   • Family history of stroke 04/13/2016   • Hypothyroidism 08/28/2015   • Heterozygous MTHFR mutation [id number] ([facility]) 11/07/2014   • Heterozygous MTHFR mutation C677T ([facility]) 11/07/2014   • Gluten intolerance 05/16/2014   • Mixed hyperlipidemia with apolipoprotein E4 variant 05/16/2014   • Vitamin D deficiency disease 05/16/2014   • GERD (gastroesophageal reflux disease)    • Polycystic ovaries    • Dysthymic disorder 11/07/2012   • Asthma 11/07/2012   • Premature surgical menopause on HRT 11/07/2012   • Dry eyes 11/07/2012       Past medical, surgical, family, " "and social history was reviewed and updated in Epic chart by me today.     Medications and allergies reviewed and updated in Epic chart by me today.     ROS:  Pertinent positives listed above in HPI. All other systems have been reviewed and are negative.    PE:   /79 (BP Location: Left arm, Patient Position: Sitting, BP Cuff Size: Adult)   Pulse 87   Temp 36.6 °C (97.8 °F) (Temporal)   Resp 15   Ht 1.702 m (5' 7\")   Wt 105.2 kg (232 lb)   LMP 10/06/2010   SpO2 97%   BMI 36.34 kg/m²   Vital signs reviewed with patient.     Gen: Well developed; well nourished; no acute distress; age appropriate appearance   Skin: Warm and dry; no rashes noted   Neuro: No focal deficits noted   Psych: AAOx4; mood and affect are appropriate/ calm and composed    A/P:  1. Dysthymic disorder  Ongoing issue for patient - recent escalation likely stems from sudden loss of her niece although patient feels otherwise. The [facility] service was just yesterday, and I feel that patient would benefit from regular mental health counseling in addition to daily medication use. She has used numerous medications in past and may be resistant to long term use of one medication. Despite recent medication adjustments, she would like to return to using Lexapro 10 mg daily with room to increase to 20 mg daily. She has medication at home and will start taking it this week. She most recently was taking Paxil 20 mg with minimal benefit. There are not overt safety risks and I do not feel that adding a benzo would be of any benefit for her at this time. Recommend that she contact [facility] to set up counseling appointment at her earliest convenience.     2. Premature surgical menopause on HRT  Patient restarted HRT treatment last month managed by Dr. Harris (stopped in October 2018 due to levels being too elevated).     Pt will keep in close contact with me regarding her condition and can call me 24/7 if she feels unsafe  Face to face time: 20 " minutes spent with greater than 50% of time spent with direct patient care and counseling about diagnosis, prognosis, risk versus benefits of treatment, and importance of compliance with instructions. All questions answered and support provided during visit today.

## 2019-04-20 ENCOUNTER — HOSPITAL ENCOUNTER (OUTPATIENT)
Dept: LAB | Facility: MEDICAL CENTER | Age: 49
End: 2019-04-20
Attending: FAMILY MEDICINE
Payer: COMMERCIAL

## 2019-04-20 LAB
CHOLEST SERPL-MCNC: 189 MG/DL (ref 100–199)
HDLC SERPL-MCNC: 61 MG/DL
LDLC SERPL CALC-MCNC: 99 MG/DL
TRIGL SERPL-MCNC: 146 MG/DL (ref 0–149)
TSH SERPL DL<=0.005 MIU/L-ACNC: 0.78 UIU/ML (ref 0.38–5.33)

## 2019-04-20 PROCEDURE — 84443 ASSAY THYROID STIM HORMONE: CPT

## 2019-04-20 PROCEDURE — 36415 COLL VENOUS BLD VENIPUNCTURE: CPT

## 2019-04-20 PROCEDURE — 80061 LIPID PANEL: CPT

## 2019-04-24 ENCOUNTER — OFFICE VISIT (OUTPATIENT)
Dept: DERMATOLOGY | Facility: IMAGING CENTER | Age: 49
End: 2019-04-24
Payer: COMMERCIAL

## 2019-04-24 DIAGNOSIS — L03.039 CHRONIC PARONYCHIA OF TOE, UNSPECIFIED LATERALITY: ICD-10-CM

## 2019-04-24 DIAGNOSIS — L71.9 ROSACEA: ICD-10-CM

## 2019-04-24 PROCEDURE — 99213 OFFICE O/P EST LOW 20 MIN: CPT | Performed by: DERMATOLOGY

## 2019-04-24 RX ORDER — NYSTATIN AND TRIAMCINOLONE ACETONIDE 100000; 1 [USP'U]/G; MG/G
OINTMENT TOPICAL
Qty: 1 TUBE | Refills: 1 | Status: SHIPPED | OUTPATIENT
Start: 2019-04-24 | End: 2021-02-05

## 2019-04-24 RX ORDER — DOXYCYCLINE 40 MG/1
40 CAPSULE ORAL EVERY MORNING
Qty: 30 CAP | Refills: 3 | Status: SHIPPED | OUTPATIENT
Start: 2019-04-24 | End: 2021-02-05

## 2019-04-24 ASSESSMENT — ENCOUNTER SYMPTOMS
CHILLS: 0
FEVER: 0

## 2019-04-24 NOTE — PROGRESS NOTES
Dermatology Return Patient Visit    Chief Complaint   Patient presents with   • Follow-Up       Subjective:     HPI:   Soraida Fregoso is a 48 y.o. female presenting for    Follow up Rosacea  Improved  Still continues to use metro gel daily and takes oracea daily as well  No side effects  Not many bumps, but still with persistent redness    History  HPI: rash on the chin   Onset: ~1 year ago  Red, bumpy, very itchy  Aggravating factors: none , no known life stressors  Alleviating factors: no  New creams/topicals: none prior to onset of symptoms (no occlusive creams), no other exposures  Prior treatments: triamcinolone 0.1%    Redness and itching around the great toenails  Had great toenail removed by podiatrist in the past, but symptoms are new  No treatments        Past Medical History:   Diagnosis Date   • Anemia     improved   • Anesthesia     N & V   • ASTHMA    • Depression    • GERD (gastroesophageal reflux disease)    • Grave's disease 8/2010   • Heart burn    • Hiatus hernia syndrome    • Hypothyroid    • Overweight(278.02)    • Polycystic ovaries    • rheumatoid 2000    rheumatoid       Current Outpatient Prescriptions on File Prior to Visit   Medication Sig Dispense Refill   • PARoxetine (PAXIL) 20 MG Tab Take 1 Tab by mouth every day. 30 Tab 3   • mometasone (NASONEX) 50 MCG/ACT nasal spray Spray 2 Sprays in nose every day. 3 Inhaler 4   • Abatacept (ORENCIA SC) Inject  as instructed.     • COD LIVER OIL PO Take  by mouth.     • Adalimumab (HUMIRA SC) Inject  as instructed.     • losartan (COZAAR) 50 MG Tab Take 1 Tab by mouth every day. 90 Tab 3   • doxycycline (ORACEA) 40 MG capsule Take 1 Cap by mouth every morning. 30 Cap 3   • SYNTHROID 50 MCG Tab TAKE TWO TABLETS BY MOUTH DAILY 180 Tab 3   • levalbuterol (XOPENEX) 0.63 MG/3ML Nebu Soln 3 mL by Nebulization route every 8 hours as needed. 24 mL 3   • TESTOSTERONE TD Apply  to skin as directed every 48 hours.     • albuterol 108 (90 BASE) MCG/ACT  "Aero Soln inhalation aerosol Inhale 2 Puffs by mouth every 6 hours as needed for Shortness of Breath. 8.5 g 12   • Evening Primrose Oil 500 MG Cap Take 1 Cap by mouth 2 Times a Day.     • Estradiol (EVAMIST) 1.53 MG/SPRAY SOLN Apply 1-3 Sprays to skin as directed every day. (Patient taking differently: Apply 2 Sprays to skin as directed every day.) 1 Bottle 0   • L-Methylfolate 1 MG TABS Take 1 Tab by mouth every day. Solgar - Metafolin     • leflunomide (ARAVA) 20 MG TABS Take 1 Tab by mouth every day. 90 Tab 3   • Cholecalciferol (VITAMIN D) 2000 UNITS CAPS Take 1 Cap by mouth every day. 30 Cap    • multivitamin (THERAGRAN) per tablet Take 1 Tab by mouth every day.     • cyclosporin (RESTASIS) 0.05 % ophthalmic emulsion Place 1 Drop in both eyes 2 times a day. 1 Each 3   • beclomethasone (QVAR) 80 MCG/ACT inhaler Inhale 1 Puff by mouth every day.       No current facility-administered medications on file prior to visit.        Allergies   Allergen Reactions   • Sulfa Drugs Swelling   • Amoxicillin Rash       Family History   Problem Relation Age of Onset   • Stroke Father 41   • Heart Disease Father         \"hole\" in heart causing stroke   • Alcohol/Drug Brother    • Arthritis Sister         rheumatoid-severe       Social History     Social History   • Marital status:      Spouse name: N/A   • Number of children: N/A   • Years of education: N/A     Occupational History   • Not on file.     Social History Main Topics   • Smoking status: Never Smoker   • Smokeless tobacco: Never Used   • Alcohol use No      Comment: twice ayear   • Drug use: No   • Sexual activity: Yes     Partners: Male     Other Topics Concern   • Not on file     Social History Narrative   • No narrative on file       Review of Systems   Constitutional: Negative for chills and fever.   Skin: Positive for itching and rash.   All other systems reviewed and are negative.       Objective:     A focused cutaneous exam was completed including: " hair, ears, face, eyelids, conjunctiva, lips, neck, feet/toenails with the following pertinent findings listed below. Remaining above-listed examined areas within normal limits / negative for rashes or lesions.    Physical Exam   Constitutional: She is oriented to person, place, and time and well-developed, well-nourished, and in no distress.   HENT:   Head: Normocephalic and atraumatic.       Eyes: Conjunctivae and lids are normal.   Neck: Normal range of motion.   Pulmonary/Chest: Effort normal.   Neurological: She is alert and oriented to person, place, and time.   Skin: Skin is warm and dry.        Psychiatric: Mood and affect normal.       DATA: none applicable to review    Assessment and Plan:     1. Rosacea  - can cont oracea 40mg daily; take with food and water; s/e GI upset sun sensitivity discussed; trial of coming off medication this month to see if remains under god control  - continue metro gel 0.75% gel daily to affected area on the skin  - recommended good OTC moisturizing products (neutrogenia hydro boost hydrating serum)  - discussed importance of regular sun protection/sunscreen use, SPF 30 or greater with broad spectrum coverage, need for reapplication every  minutes  - trigger avoidance  - discussed treatments for redness (creams vs BBL), declines today    2. Chronic paronychia of toe, unspecified laterality  - recommended vinegar soaks  - tac/nystatin BID prn  - rtc podiatry if no improvement    Followup: Return in about 6 months (around 10/24/2019), or if symptoms worsen or fail to improve.    Elida Tracy M.D.

## 2019-05-14 DIAGNOSIS — F34.1 DYSTHYMIC DISORDER: Chronic | ICD-10-CM

## 2019-05-14 RX ORDER — ESCITALOPRAM OXALATE 20 MG/1
20 TABLET ORAL DAILY
Qty: 90 TAB | Refills: 3 | Status: SHIPPED | OUTPATIENT
Start: 2019-05-14 | End: 2020-01-24 | Stop reason: SDUPTHER

## 2019-06-21 ENCOUNTER — HOSPITAL ENCOUNTER (OUTPATIENT)
Dept: RADIOLOGY | Facility: MEDICAL CENTER | Age: 49
End: 2019-06-21
Attending: FAMILY MEDICINE
Payer: COMMERCIAL

## 2019-06-21 ENCOUNTER — OFFICE VISIT (OUTPATIENT)
Dept: INTERNAL MEDICINE | Facility: IMAGING CENTER | Age: 49
End: 2019-06-21
Payer: COMMERCIAL

## 2019-06-21 ENCOUNTER — HOSPITAL ENCOUNTER (OUTPATIENT)
Dept: LAB | Facility: MEDICAL CENTER | Age: 49
End: 2019-06-21
Attending: PODIATRIST
Payer: COMMERCIAL

## 2019-06-21 ENCOUNTER — HOSPITAL ENCOUNTER (OUTPATIENT)
Dept: LAB | Facility: MEDICAL CENTER | Age: 49
End: 2019-06-21
Attending: SPECIALIST
Payer: COMMERCIAL

## 2019-06-21 VITALS
BODY MASS INDEX: 36.41 KG/M2 | OXYGEN SATURATION: 93 % | HEIGHT: 67 IN | WEIGHT: 232 LBS | DIASTOLIC BLOOD PRESSURE: 78 MMHG | HEART RATE: 73 BPM | RESPIRATION RATE: 16 BRPM | SYSTOLIC BLOOD PRESSURE: 122 MMHG | TEMPERATURE: 97.6 F

## 2019-06-21 DIAGNOSIS — Z12.31 ENCOUNTER FOR SCREENING MAMMOGRAM FOR BREAST CANCER: ICD-10-CM

## 2019-06-21 DIAGNOSIS — J06.9 UPPER RESPIRATORY TRACT INFECTION, UNSPECIFIED TYPE: ICD-10-CM

## 2019-06-21 LAB
ALBUMIN SERPL BCP-MCNC: 4 G/DL (ref 3.2–4.9)
ALBUMIN SERPL BCP-MCNC: 4.1 G/DL (ref 3.2–4.9)
ALBUMIN/GLOB SERPL: 1.4 G/DL
ALP SERPL-CCNC: 84 U/L (ref 30–99)
ALP SERPL-CCNC: 85 U/L (ref 30–99)
ALT SERPL-CCNC: 23 U/L (ref 2–50)
ALT SERPL-CCNC: 24 U/L (ref 2–50)
ANION GAP SERPL CALC-SCNC: 9 MMOL/L (ref 0–11.9)
AST SERPL-CCNC: 29 U/L (ref 12–45)
AST SERPL-CCNC: 30 U/L (ref 12–45)
BILIRUB CONJ SERPL-MCNC: <0.1 MG/DL (ref 0.1–0.5)
BILIRUB INDIRECT SERPL-MCNC: NORMAL MG/DL (ref 0–1)
BILIRUB SERPL-MCNC: 0.5 MG/DL (ref 0.1–1.5)
BILIRUB SERPL-MCNC: 0.5 MG/DL (ref 0.1–1.5)
BUN SERPL-MCNC: 16 MG/DL (ref 8–22)
CALCIUM SERPL-MCNC: 9.1 MG/DL (ref 8.5–10.5)
CHLORIDE SERPL-SCNC: 107 MMOL/L (ref 96–112)
CO2 SERPL-SCNC: 26 MMOL/L (ref 20–33)
CREAT SERPL-MCNC: 0.82 MG/DL (ref 0.5–1.4)
GLOBULIN SER CALC-MCNC: 2.8 G/DL (ref 1.9–3.5)
GLUCOSE SERPL-MCNC: 81 MG/DL (ref 65–99)
POTASSIUM SERPL-SCNC: 3.9 MMOL/L (ref 3.6–5.5)
PROT SERPL-MCNC: 6.7 G/DL (ref 6–8.2)
PROT SERPL-MCNC: 6.8 G/DL (ref 6–8.2)
SODIUM SERPL-SCNC: 142 MMOL/L (ref 135–145)

## 2019-06-21 PROCEDURE — 80053 COMPREHEN METABOLIC PANEL: CPT

## 2019-06-21 PROCEDURE — 77063 BREAST TOMOSYNTHESIS BI: CPT

## 2019-06-21 PROCEDURE — 36415 COLL VENOUS BLD VENIPUNCTURE: CPT

## 2019-06-21 PROCEDURE — 99214 OFFICE O/P EST MOD 30 MIN: CPT | Performed by: FAMILY MEDICINE

## 2019-06-21 PROCEDURE — 80076 HEPATIC FUNCTION PANEL: CPT

## 2019-06-21 RX ORDER — AZITHROMYCIN 250 MG/1
TABLET, FILM COATED ORAL
Qty: 6 TAB | Refills: 0 | Status: SHIPPED | OUTPATIENT
Start: 2019-06-21 | End: 2019-08-12

## 2019-06-21 NOTE — PROGRESS NOTES
"Chief Complaint   Patient presents with   • URI     Started last weekend       HPI:  Patient is a 49 y.o. female established patient who presents today for evaluation of new URI symptoms that started last weekend. She reports nasal congestion and drip with associated dry cough. She denies associated N/V/D/F/bowel or bladder changes and has been using Zycam without resolution of symptoms. She is in the process of changing jobs and is very happy overall at this time. She has her annual mammogram scheduled for later today and had her labs drawn prior to our visit together.     Patient Active Problem List    Diagnosis Date Noted   • Primary insomnia 10/07/2018   • Rheumatoid arthritis involving multiple sites with positive rheumatoid factor ([facility]) 05/17/2017   • Obesity (BMI 35.0-39.9 without comorbidity) 11/11/2016   • Essential hypertension 11/11/2016   • Family history of stroke 04/13/2016   • Hypothyroidism 08/28/2015   • Heterozygous MTHFR mutation [id number] ([facility]) 11/07/2014   • Heterozygous MTHFR mutation C677T ([facility]) 11/07/2014   • Gluten intolerance 05/16/2014   • Mixed hyperlipidemia with apolipoprotein E4 variant 05/16/2014   • Vitamin D deficiency disease 05/16/2014   • GERD (gastroesophageal reflux disease)    • Polycystic ovaries    • Dysthymic disorder 11/07/2012   • Asthma 11/07/2012   • Premature surgical menopause on HRT 11/07/2012   • Dry eyes 11/07/2012       Past medical, surgical, family, and social history was reviewed in Epic chart by me today.     Medications and allergies reviewed and updated in Epic chart by me today.     ROS:  Pertinent positives listed above in HPI. All other systems have been reviewed and are negative.    PE:   /78 (BP Location: Left arm, Patient Position: Sitting, BP Cuff Size: Adult)   Pulse 73   Temp 36.4 °C (97.6 °F) (Temporal)   Resp 16   Ht 1.702 m (5' 7\")   Wt 105.2 kg (232 lb)   LMP 10/06/2010   SpO2 93%   BMI 36.34 kg/m²   Vital signs reviewed with patient. "     Gen: Well developed; well nourished; no acute distress; non toxic appearance   HEENT: Normocephalic; atraumatic; PEERLA b/l; sclera clear b/l; b/l external auditory canals WNL; b/l TM WNL; nares patent; oropharynx clear but posterior aspect is red; oral mucosa moist; tongue midline; dentition adequate; congested  Neck: No adenopathy; no thyromegaly  CV: Regular rate and rhythm; S1/ S2 present; no murmur, gallop or rub noted  Pulm: No respiratory distress; clear to ascultation b/l; no wheezing or stridor noted b/l  Abd: Adequate bowel sounds noted; soft and nontender; no rebound, rigidity, nor distention   Extremities: No peripheral edema b/l LE extremities/ no clubbing nor cyanosis noted  Skin: Warm and dry; no rashes noted   Neuro: No focal deficits noted   Psych: AAOx4; mood and affect are appropriate    A/P:  1. Upper respiratory tract infection, unspecified type  Patient has been ill for approximately one week and recommend starting abx use today, increasing Nasonex to BID while sick, use Delsym PRN for cough control, maintain hydration, rest, d/c Zycam, and follow clinical response. New RX sent to pharmacy.   - azithromycin (ZITHROMAX) 250 MG Tab; Take tablets by mouth as directed.  Dispense: 6 Tab; Refill: 0     Pt is to contact our office if current condition does not resolve with treatment plans detailed above, and I will be in touch with her when mammogram results are available.

## 2019-06-28 ENCOUNTER — HOSPITAL ENCOUNTER (OUTPATIENT)
Dept: LAB | Facility: MEDICAL CENTER | Age: 49
End: 2019-06-28
Attending: SPECIALIST

## 2019-06-28 LAB
BASOPHILS # BLD AUTO: 1.2 % (ref 0–1.8)
BASOPHILS # BLD: 0.04 K/UL (ref 0–0.12)
EOSINOPHIL # BLD AUTO: 0.28 K/UL (ref 0–0.51)
EOSINOPHIL NFR BLD: 8.6 % (ref 0–6.9)
ERYTHROCYTE [DISTWIDTH] IN BLOOD BY AUTOMATED COUNT: 45.9 FL (ref 35.9–50)
HCT VFR BLD AUTO: 47.8 % (ref 37–47)
HGB BLD-MCNC: 14.8 G/DL (ref 12–16)
IMM GRANULOCYTES # BLD AUTO: 0 K/UL (ref 0–0.11)
IMM GRANULOCYTES NFR BLD AUTO: 0 % (ref 0–0.9)
LYMPHOCYTES # BLD AUTO: 1.41 K/UL (ref 1–4.8)
LYMPHOCYTES NFR BLD: 43.1 % (ref 22–41)
MCH RBC QN AUTO: 28.6 PG (ref 27–33)
MCHC RBC AUTO-ENTMCNC: 31 G/DL (ref 33.6–35)
MCV RBC AUTO: 92.3 FL (ref 81.4–97.8)
MONOCYTES # BLD AUTO: 0.3 K/UL (ref 0–0.85)
MONOCYTES NFR BLD AUTO: 9.2 % (ref 0–13.4)
NEUTROPHILS # BLD AUTO: 1.24 K/UL (ref 2–7.15)
NEUTROPHILS NFR BLD: 37.9 % (ref 44–72)
NRBC # BLD AUTO: 0 K/UL
NRBC BLD-RTO: 0 /100 WBC
PLATELET # BLD AUTO: 196 K/UL (ref 164–446)
PMV BLD AUTO: 11.8 FL (ref 9–12.9)
RBC # BLD AUTO: 5.18 M/UL (ref 4.2–5.4)
WBC # BLD AUTO: 3.3 K/UL (ref 4.8–10.8)

## 2019-06-28 PROCEDURE — 85025 COMPLETE CBC W/AUTO DIFF WBC: CPT

## 2019-06-28 PROCEDURE — 36415 COLL VENOUS BLD VENIPUNCTURE: CPT

## 2019-07-18 DIAGNOSIS — E03.9 HYPOTHYROIDISM, UNSPECIFIED TYPE: ICD-10-CM

## 2019-07-18 RX ORDER — LEVOTHYROXINE SODIUM 50 MCG
TABLET ORAL
Qty: 180 TAB | Refills: 3 | Status: SHIPPED | OUTPATIENT
Start: 2019-07-18 | End: 2020-08-13

## 2019-08-12 ENCOUNTER — OFFICE VISIT (OUTPATIENT)
Dept: INTERNAL MEDICINE | Facility: IMAGING CENTER | Age: 49
End: 2019-08-12
Payer: COMMERCIAL

## 2019-08-12 VITALS
TEMPERATURE: 98.8 F | SYSTOLIC BLOOD PRESSURE: 119 MMHG | HEART RATE: 83 BPM | BODY MASS INDEX: 36.4 KG/M2 | OXYGEN SATURATION: 94 % | HEIGHT: 67 IN | RESPIRATION RATE: 16 BRPM | DIASTOLIC BLOOD PRESSURE: 82 MMHG | WEIGHT: 231.92 LBS

## 2019-08-12 DIAGNOSIS — R42 DIZZINESS: ICD-10-CM

## 2019-08-12 PROCEDURE — 99213 OFFICE O/P EST LOW 20 MIN: CPT | Performed by: FAMILY MEDICINE

## 2019-08-13 RX ORDER — ABATACEPT 125 MG/ML
INJECTION, SOLUTION SUBCUTANEOUS
COMMUNITY
Start: 2019-06-18

## 2019-08-13 RX ORDER — KETOCONAZOLE 20 MG/G
CREAM TOPICAL
Refills: 12 | COMMUNITY
Start: 2019-05-21 | End: 2021-02-05

## 2019-08-13 RX ORDER — ESTRADIOL 0.1 MG/G
CREAM VAGINAL
COMMUNITY
Start: 2019-06-01 | End: 2021-02-05

## 2019-08-13 RX ORDER — CLOBETASOL PROPIONATE 0.5 MG/G
OINTMENT TOPICAL
Refills: 1 | COMMUNITY
Start: 2019-06-19 | End: 2021-02-05

## 2019-08-13 RX ORDER — TERBINAFINE HYDROCHLORIDE 250 MG/1
TABLET ORAL
Refills: 0 | COMMUNITY
Start: 2019-06-21

## 2019-08-13 NOTE — PROGRESS NOTES
Chief Complaint   Patient presents with   • Dizziness     Started today. Feeling most dizzy when going from sitting to standing. Feels a slight pressure headache.        HISTORY OF PRESENT ILLNESS: Patient is a 49 y.o. female established patient who presents today to complaining started this morning.  She states she felt fine yesterday.  The dizziness started on her way to work.  She felt a little lightheaded.  She was walking into work she had to sit down.  She describes it as a lightheadedness and a little unsteadiness.  It came and went this morning.  She feels better now.  She has little pressure in her frontal sinuses.  Her ears feel fine.  She did have breakfast.  She has been drinking fluids.  No new medications.  No palpitations.  No lightheadedness.  She has not had anything like this before.  No change in medicines.  She is taking Nasacort.  No fevers or chills.        Patient Active Problem List    Diagnosis Date Noted   • Primary insomnia 10/07/2018   • Rheumatoid arthritis involving multiple sites with positive rheumatoid factor ([facility]) 05/17/2017   • Obesity (BMI 35.0-39.9 without comorbidity) 11/11/2016   • Essential hypertension 11/11/2016   • Family history of stroke 04/13/2016   • Hypothyroidism 08/28/2015   • Heterozygous MTHFR mutation [id number] ([facility]) 11/07/2014   • Heterozygous MTHFR mutation C677T ([facility]) 11/07/2014   • Gluten intolerance 05/16/2014   • Mixed hyperlipidemia with apolipoprotein E4 variant 05/16/2014   • Vitamin D deficiency disease 05/16/2014   • GERD (gastroesophageal reflux disease)    • Polycystic ovaries    • Dysthymic disorder 11/07/2012   • Asthma 11/07/2012   • Premature surgical menopause on HRT 11/07/2012   • Dry eyes 11/07/2012     Current Outpatient Medications on File Prior to Visit   Medication Sig Dispense Refill   • clobetasol (TEMOVATE) 0.05 % Ointment APPLY A THIN LAYER TWICE A DAY X1WEEK THEN DAILY X1WEEK THEN EVERY OTHER DAY X2 WEEKS THEN AS NEEDED  1   • estradiol  (ESTRACE) 0.1 MG/GM vaginal cream      • ketoconazole (NIZORAL) 2 % Cream APPLY A THIN LAYER TOPICALLY TO THE AFFECTED AREA ON TOES AND FEET TWICE A DAY  12   • terbinafine (LAMISIL) 250 MG Tab TAKE 1 TABLET BY MOUTH EVERY DAY AFTER MEALS  0   • ORENCIA CLICKJECT 125 MG/ML Solution Auto-injector      • SYNTHROID 50 MCG Tab TAKE 2 TABLETS BY MOUTH EVERY  Tab 3   • escitalopram (LEXAPRO) 20 MG tablet Take 1 Tab by mouth every day. 90 Tab 3   • doxycycline (ORACEA) 40 MG capsule Take 1 Cap by mouth every morning. 30 Cap 3   • metronidazole (METROCREAM) 0.75 % cream Apply 1 Application to affected area(s) 2 times a day. 45 g 6   • nystatin-triamcinalone (MYCOLOG) 611673-9.1 UNIT/GM-% Ointment To affected area around the toenails twice daily 1 Tube 1   • mometasone (NASONEX) 50 MCG/ACT nasal spray Spray 2 Sprays in nose every day. 3 Inhaler 4   • Abatacept (ORENCIA SC) Inject  as instructed.     • COD LIVER OIL PO Take  by mouth.     • losartan (COZAAR) 50 MG Tab Take 1 Tab by mouth every day. 90 Tab 3   • levalbuterol (XOPENEX) 0.63 MG/3ML Nebu Soln 3 mL by Nebulization route every 8 hours as needed. 24 mL 3   • TESTOSTERONE TD Apply  to skin as directed every 48 hours.     • albuterol 108 (90 BASE) MCG/ACT Aero Soln inhalation aerosol Inhale 2 Puffs by mouth every 6 hours as needed for Shortness of Breath. 8.5 g 12   • Evening Primrose Oil 500 MG Cap Take 1 Cap by mouth 2 Times a Day.     • Estradiol (EVAMIST) 1.53 MG/SPRAY SOLN Apply 1-3 Sprays to skin as directed every day. (Patient taking differently: Apply 2 Sprays to skin as directed every day.) 1 Bottle 0   • leflunomide (ARAVA) 20 MG TABS Take 1 Tab by mouth every day. 90 Tab 3   • Cholecalciferol (VITAMIN D) 2000 UNITS CAPS Take 1 Cap by mouth every day. 30 Cap    • multivitamin (THERAGRAN) per tablet Take 1 Tab by mouth every day.     • cyclosporin (RESTASIS) 0.05 % ophthalmic emulsion Place 1 Drop in both eyes 2 times a day. 1 Each 3     No current  "facility-administered medications on file prior to visit.          Past medical, surgical, family, and social history is reviewed and updated in Epic chart by me today.   Medications and allergies reviewed and updated in Epic chart by me today.     REVIEW OF SYSTEMS:  GENERAL: No fatigue, no weight loss.  HEENT:  Ears--no earache, no change in hearing,  no tinnitus.                 Eyes--no blurred vision, no discharge or pain                 Throat--No sore throat, no dysphagia, no hoarseness  CV:  No chest pain,dyspnea,palpitations or edema.  RESP:  No sob,cough,wheezing or hemoptysis.  GI: No dysphagia, heartburn,abdominal pain, nausea, vomiting, diarrhea or constipation.       No melena, jaundice, bleeding, incontinence or change in bowel habits.  :  No dysuria, polyuria, hematuria, incontinence, or nocturia.  MS:  No joint swelling, myalgias, or arthralgias.  NEURO:  No seizures, syncope, paralysis, tremor, or weakness.  SKIN: No new or concerning skin lesions or changes.   PSYCH: Mood fine.    Vitals:    08/12/19 1556 08/12/19 1558   BP: 122/82 119/82   BP Location: Left arm Left arm   Patient Position: Sitting Standing   BP Cuff Size: Adult Adult   Pulse: 83    Resp: 16    Temp: 37.1 °C (98.8 °F)    TempSrc: Temporal    SpO2: 94%    Weight: 105.2 kg (231 lb 14.8 oz)    Height: 1.702 m (5' 7\")      Physical Exam:  GENERAL;  Obese female.  No acute distress.  HEENT:  PERRLA, EOMI, no conjuctival injection, no icterus.  No nystagmus.                 TM's normal bilat.                 Nasal mucosa normal.                 Pharynx clear.. No exudate.  NECK: Supple with no adenopathy or thyromegaly.  LUNGS: Clear with no rales, rhonchi, or wheezes.  COR: RR with no murmur, gallop or rub.  Neuro: Cranial nerves II through XII are intact.  Motor and sensation is grossly intact.  Her exam is nonfocal and symmetric.  EXT: No edema.      Assessment/Plan:  1. Dizziness.  Uncertain etiology.  Suspect allergies encouraged " her to continue with Nasacort.  May add Claritin or Zyrtec.  Adequate fluids.  Monitor symptoms.  She states she is feeling better this afternoon.  Encouraged her to call for any additional concerns or changes.

## 2019-10-24 RX ORDER — METRONIDAZOLE 7.5 MG/G
1 GEL TOPICAL DAILY
Qty: 1 TUBE | Refills: 6 | Status: SHIPPED | OUTPATIENT
Start: 2019-10-24 | End: 2021-02-05

## 2019-11-11 DIAGNOSIS — J06.9 UPPER RESPIRATORY TRACT INFECTION, UNSPECIFIED TYPE: ICD-10-CM

## 2019-11-11 RX ORDER — LEVALBUTEROL INHALATION SOLUTION 0.63 MG/3ML
0.63 SOLUTION RESPIRATORY (INHALATION) EVERY 8 HOURS PRN
Qty: 24 ML | Refills: 3 | Status: SHIPPED | OUTPATIENT
Start: 2019-11-11 | End: 2022-06-28

## 2020-01-23 RX ORDER — BECLOMETHASONE DIPROPIONATE HFA 80 UG/1
AEROSOL, METERED RESPIRATORY (INHALATION)
COMMUNITY
Start: 2019-11-04 | End: 2022-06-28

## 2020-01-24 ENCOUNTER — HOSPITAL ENCOUNTER (OUTPATIENT)
Dept: LAB | Facility: MEDICAL CENTER | Age: 50
End: 2020-01-24
Attending: SPECIALIST
Payer: COMMERCIAL

## 2020-01-24 ENCOUNTER — OFFICE VISIT (OUTPATIENT)
Dept: INTERNAL MEDICINE | Facility: IMAGING CENTER | Age: 50
End: 2020-01-24
Payer: COMMERCIAL

## 2020-01-24 ENCOUNTER — HOSPITAL ENCOUNTER (OUTPATIENT)
Facility: MEDICAL CENTER | Age: 50
End: 2020-01-24
Attending: FAMILY MEDICINE
Payer: COMMERCIAL

## 2020-01-24 VITALS
BODY MASS INDEX: 36.4 KG/M2 | DIASTOLIC BLOOD PRESSURE: 78 MMHG | OXYGEN SATURATION: 100 % | TEMPERATURE: 97.9 F | WEIGHT: 231.92 LBS | SYSTOLIC BLOOD PRESSURE: 121 MMHG | HEART RATE: 72 BPM | RESPIRATION RATE: 17 BRPM | HEIGHT: 67 IN

## 2020-01-24 DIAGNOSIS — E03.9 HYPOTHYROIDISM, UNSPECIFIED TYPE: ICD-10-CM

## 2020-01-24 DIAGNOSIS — Z00.00 HEALTH CARE MAINTENANCE: ICD-10-CM

## 2020-01-24 DIAGNOSIS — F34.1 DYSTHYMIC DISORDER: Chronic | ICD-10-CM

## 2020-01-24 DIAGNOSIS — E78.2 MIXED HYPERLIPIDEMIA WITH APOLIPOPROTEIN E4 VARIANT: ICD-10-CM

## 2020-01-24 DIAGNOSIS — M79.605 LEFT LEG PAIN: ICD-10-CM

## 2020-01-24 PROBLEM — E66.9 OBESITY (BMI 30-39.9): Status: ACTIVE | Noted: 2020-01-24

## 2020-01-24 LAB
25(OH)D3 SERPL-MCNC: 30 NG/ML (ref 30–100)
ALBUMIN SERPL BCP-MCNC: 4 G/DL (ref 3.2–4.9)
ALBUMIN/GLOB SERPL: 1.4 G/DL
ALP SERPL-CCNC: 75 U/L (ref 30–99)
ALT SERPL-CCNC: 26 U/L (ref 2–50)
ANION GAP SERPL CALC-SCNC: 10 MMOL/L (ref 0–11.9)
AST SERPL-CCNC: 35 U/L (ref 12–45)
BASOPHILS # BLD AUTO: 0.5 % (ref 0–1.8)
BASOPHILS # BLD: 0.02 K/UL (ref 0–0.12)
BILIRUB SERPL-MCNC: 0.7 MG/DL (ref 0.1–1.5)
BUN SERPL-MCNC: 20 MG/DL (ref 8–22)
CALCIUM SERPL-MCNC: 9.6 MG/DL (ref 8.5–10.5)
CHLORIDE SERPL-SCNC: 105 MMOL/L (ref 96–112)
CHOLEST SERPL-MCNC: 175 MG/DL (ref 100–199)
CO2 SERPL-SCNC: 25 MMOL/L (ref 20–33)
CREAT SERPL-MCNC: 0.87 MG/DL (ref 0.5–1.4)
EOSINOPHIL # BLD AUTO: 0.31 K/UL (ref 0–0.51)
EOSINOPHIL NFR BLD: 7.5 % (ref 0–6.9)
ERYTHROCYTE [DISTWIDTH] IN BLOOD BY AUTOMATED COUNT: 43.8 FL (ref 35.9–50)
EST. AVERAGE GLUCOSE BLD GHB EST-MCNC: 105 MG/DL
GLOBULIN SER CALC-MCNC: 2.8 G/DL (ref 1.9–3.5)
GLUCOSE SERPL-MCNC: 93 MG/DL (ref 65–99)
HBA1C MFR BLD: 5.3 % (ref 0–5.6)
HCT VFR BLD AUTO: 47.5 % (ref 37–47)
HDLC SERPL-MCNC: 56 MG/DL
HGB BLD-MCNC: 15.3 G/DL (ref 12–16)
IMM GRANULOCYTES # BLD AUTO: 0.01 K/UL (ref 0–0.11)
IMM GRANULOCYTES NFR BLD AUTO: 0.2 % (ref 0–0.9)
LDLC SERPL CALC-MCNC: 96 MG/DL
LYMPHOCYTES # BLD AUTO: 1.31 K/UL (ref 1–4.8)
LYMPHOCYTES NFR BLD: 31.8 % (ref 22–41)
MCH RBC QN AUTO: 29.5 PG (ref 27–33)
MCHC RBC AUTO-ENTMCNC: 32.2 G/DL (ref 33.6–35)
MCV RBC AUTO: 91.7 FL (ref 81.4–97.8)
MONOCYTES # BLD AUTO: 0.43 K/UL (ref 0–0.85)
MONOCYTES NFR BLD AUTO: 10.4 % (ref 0–13.4)
NEUTROPHILS # BLD AUTO: 2.04 K/UL (ref 2–7.15)
NEUTROPHILS NFR BLD: 49.6 % (ref 44–72)
NRBC # BLD AUTO: 0 K/UL
NRBC BLD-RTO: 0 /100 WBC
PLATELET # BLD AUTO: 189 K/UL (ref 164–446)
PMV BLD AUTO: 12.3 FL (ref 9–12.9)
POTASSIUM SERPL-SCNC: 3.8 MMOL/L (ref 3.6–5.5)
PROT SERPL-MCNC: 6.8 G/DL (ref 6–8.2)
RBC # BLD AUTO: 5.18 M/UL (ref 4.2–5.4)
SODIUM SERPL-SCNC: 140 MMOL/L (ref 135–145)
TRIGL SERPL-MCNC: 114 MG/DL (ref 0–149)
TSH SERPL DL<=0.005 MIU/L-ACNC: 1.63 UIU/ML (ref 0.38–5.33)
WBC # BLD AUTO: 4.1 K/UL (ref 4.8–10.8)

## 2020-01-24 PROCEDURE — 83036 HEMOGLOBIN GLYCOSYLATED A1C: CPT

## 2020-01-24 PROCEDURE — 82306 VITAMIN D 25 HYDROXY: CPT

## 2020-01-24 PROCEDURE — 99214 OFFICE O/P EST MOD 30 MIN: CPT | Performed by: FAMILY MEDICINE

## 2020-01-24 PROCEDURE — 84443 ASSAY THYROID STIM HORMONE: CPT

## 2020-01-24 PROCEDURE — 80053 COMPREHEN METABOLIC PANEL: CPT

## 2020-01-24 PROCEDURE — 80061 LIPID PANEL: CPT

## 2020-01-24 PROCEDURE — 85025 COMPLETE CBC W/AUTO DIFF WBC: CPT

## 2020-01-24 RX ORDER — ESCITALOPRAM OXALATE 20 MG/1
20 TABLET ORAL DAILY
Qty: 90 TAB | Refills: 3 | Status: SHIPPED | OUTPATIENT
Start: 2020-01-24 | End: 2021-02-07 | Stop reason: SDUPTHER

## 2020-01-24 ASSESSMENT — PATIENT HEALTH QUESTIONNAIRE - PHQ9
1. LITTLE INTEREST OR PLEASURE IN DOING THINGS: SEVERAL DAYS
SUM OF ALL RESPONSES TO PHQ QUESTIONS 1-9: 8
5. POOR APPETITE OR OVEREATING: SEVERAL DAYS
2. FEELING DOWN, DEPRESSED, IRRITABLE, OR HOPELESS: SEVERAL DAYS
9. THOUGHTS THAT YOU WOULD BE BETTER OFF DEAD, OR OF HURTING YOURSELF: NOT AT ALL
8. MOVING OR SPEAKING SO SLOWLY THAT OTHER PEOPLE COULD HAVE NOTICED. OR THE OPPOSITE, BEING SO FIGETY OR RESTLESS THAT YOU HAVE BEEN MOVING AROUND A LOT MORE THAN USUAL: NOT AT ALL
4. FEELING TIRED OR HAVING LITTLE ENERGY: MORE THAN HALF THE DAYS
6. FEELING BAD ABOUT YOURSELF - OR THAT YOU ARE A FAILURE OR HAVE LET YOURSELF OR YOUR FAMILY DOWN: NOT AL ALL
SUM OF ALL RESPONSES TO PHQ9 QUESTIONS 1 AND 2: 2
3. TROUBLE FALLING OR STAYING ASLEEP OR SLEEPING TOO MUCH: SEVERAL DAYS
7. TROUBLE CONCENTRATING ON THINGS, SUCH AS READING THE NEWSPAPER OR WATCHING TELEVISION: MORE THAN HALF THE DAYS

## 2020-01-24 NOTE — PROGRESS NOTES
Chief Complaint   Patient presents with   • Medication Refill     Lexapro   • Leg Pain     behind left knee       HPI:  Patient is a 49 y.o. female established patient who presents today to have fasting labs drawn and to obtain new Lexapro prescription to control chronic dysthymia. She and her family continue to grief the sudden death of her niece from suicide in 2019, and Soraida denies any self harm ideations nor personal safety issues. She is also reporting new pinpoint area of tenderness on left leg above her knee joint area. She denies associated joint swelling, no skin redness, and does not interfere with her walking patterns. She denies specific trauma to the area but she often sits with her legs crossed and extended for long periods of time. She denies other new concerns and is in a very calm and happy mood today.     Patient Active Problem List    Diagnosis Date Noted   • Obesity (BMI 30-39.9) 01/24/2020   • Primary insomnia 10/07/2018   • Rheumatoid arthritis involving multiple sites with positive rheumatoid factor ([facility]) 05/17/2017   • Obesity (BMI 35.0-39.9 without comorbidity) 11/11/2016   • Essential hypertension 11/11/2016   • Family history of stroke 04/13/2016   • Hypothyroidism 08/28/2015   • Heterozygous MTHFR mutation [id number] ([facility]) 11/07/2014   • Heterozygous MTHFR mutation C677T ([facility]) 11/07/2014   • Gluten intolerance 05/16/2014   • Mixed hyperlipidemia with apolipoprotein E4 variant 05/16/2014   • Vitamin D deficiency disease 05/16/2014   • GERD (gastroesophageal reflux disease)    • Polycystic ovaries    • Dysthymic disorder 11/07/2012   • Asthma 11/07/2012   • Premature surgical menopause on HRT 11/07/2012   • Dry eyes 11/07/2012       Past medical, surgical, family, and social history was reviewed and updated in Epic chart by me today.     Medications and allergies reviewed and updated in Epic chart by me today.     ROS:  Pertinent positives listed above in HPI. All other systems have been  "reviewed and are negative.    PE:   /78 (BP Location: Left arm, Patient Position: Sitting, BP Cuff Size: Adult)   Pulse 72   Temp 36.6 °C (97.9 °F) (Temporal)   Resp 17   Ht 1.702 m (5' 7\")   Wt 105.2 kg (231 lb 14.8 oz)   LMP 10/06/2010   SpO2 100%   BMI 36.32 kg/m²   Vital signs reviewed with patient.     Gen: Well developed; well nourished; no acute distress; age appropriate appearance   CV: Regular rate and rhythm; S1/ S2 present; no murmur, gallop or rub noted  Pulm: No respiratory distress; clear to ascultation b/l; no wheezing or stridor noted b/l  Abd: Adequate bowel sounds noted; soft and nontender; no rebound, rigidity, nor distention  Lower extremities: No peripheral edema b/l LE extremities/ no clubbing nor cyanosis noted; pinpoint tenderness with palpation over distal aspect of left semimembranous muscle insertion area - this area overlies a vein which has become tender to touch without associated skin redness/edema/vascular changes  Skin: Warm and dry; no rashes noted   Neuro: No focal deficits noted; normal gait observed  Psych: AAOx4; mood and affect are appropriate    A/P:  1. Dysthymic disorder  Stable/ patient is to continue daily Lexapro use. New RX sent to pharmacy.   - escitalopram (LEXAPRO) 20 MG tablet; Take 1 Tab by mouth every day.  Dispense: 90 Tab; Refill: 3    2. Left leg pain  New condition for patient - exam consistent with irritation of tendon over a small vein on medial aspect of posterior leg proximal to knee fossa. Condition does not interfere with ambulation and is not causing edema/ overlying skin changes. Recommend supportive care and avoid movements that hyperextend leg leg/knee joint area. No concern for DVT at this time.     3. Hypothyroidism, unspecified type  Stable/ pt is to continue current Synthroid use and is due for thyroid labs.   - TSH WITH REFLEX TO FT4; Future    4. Mixed hyperlipidemia with apolipoprotein E4 variant  Patient is due for fasting lipid " panel and can continue daily cod liver oil supplementation.   - Lipid Profile; Future    5. Health care maintenance  Patient is otherwise UTD with HCM items and is due for lab draw today. She also has order from Dr. De Luna for CBC and CMP lab draw today.   - VITAMIN D,25 HYDROXY; Future  - HEMOGLOBIN A1C; Future

## 2020-02-25 DIAGNOSIS — I10 ESSENTIAL HYPERTENSION: ICD-10-CM

## 2020-02-25 RX ORDER — LOSARTAN POTASSIUM 50 MG/1
TABLET ORAL
Qty: 90 TAB | Refills: 3 | Status: SHIPPED | OUTPATIENT
Start: 2020-02-25 | End: 2021-02-07 | Stop reason: SDUPTHER

## 2020-04-08 DIAGNOSIS — J06.9 UPPER RESPIRATORY TRACT INFECTION, UNSPECIFIED TYPE: ICD-10-CM

## 2020-04-08 DIAGNOSIS — J30.89 OTHER ALLERGIC RHINITIS: ICD-10-CM

## 2020-04-08 RX ORDER — MOMETASONE FUROATE 50 UG/1
SPRAY, METERED NASAL
Qty: 2 INHALER | Refills: 4 | Status: SHIPPED | OUTPATIENT
Start: 2020-04-08 | End: 2021-02-23

## 2020-06-05 ENCOUNTER — HOSPITAL ENCOUNTER (OUTPATIENT)
Dept: LAB | Facility: MEDICAL CENTER | Age: 50
End: 2020-06-05
Attending: SPECIALIST
Payer: COMMERCIAL

## 2020-06-05 ENCOUNTER — NON-PROVIDER VISIT (OUTPATIENT)
Dept: INTERNAL MEDICINE | Facility: IMAGING CENTER | Age: 50
End: 2020-06-05
Payer: COMMERCIAL

## 2020-06-05 LAB
ALBUMIN SERPL BCP-MCNC: 4 G/DL (ref 3.2–4.9)
ALBUMIN/GLOB SERPL: 1.7 G/DL
ALP SERPL-CCNC: 102 U/L (ref 30–99)
ALT SERPL-CCNC: 24 U/L (ref 2–50)
ANION GAP SERPL CALC-SCNC: 10 MMOL/L (ref 7–16)
AST SERPL-CCNC: 29 U/L (ref 12–45)
BASOPHILS # BLD AUTO: 1 % (ref 0–1.8)
BASOPHILS # BLD: 0.04 K/UL (ref 0–0.12)
BILIRUB SERPL-MCNC: 0.6 MG/DL (ref 0.1–1.5)
BUN SERPL-MCNC: 11 MG/DL (ref 8–22)
CALCIUM SERPL-MCNC: 9.4 MG/DL (ref 8.5–10.5)
CHLORIDE SERPL-SCNC: 103 MMOL/L (ref 96–112)
CO2 SERPL-SCNC: 25 MMOL/L (ref 20–33)
CREAT SERPL-MCNC: 0.77 MG/DL (ref 0.5–1.4)
EOSINOPHIL # BLD AUTO: 0.56 K/UL (ref 0–0.51)
EOSINOPHIL NFR BLD: 13.7 % (ref 0–6.9)
ERYTHROCYTE [DISTWIDTH] IN BLOOD BY AUTOMATED COUNT: 42.4 FL (ref 35.9–50)
GLOBULIN SER CALC-MCNC: 2.4 G/DL (ref 1.9–3.5)
GLUCOSE SERPL-MCNC: 94 MG/DL (ref 65–99)
HCT VFR BLD AUTO: 48.2 % (ref 37–47)
HGB BLD-MCNC: 15.9 G/DL (ref 12–16)
IMM GRANULOCYTES # BLD AUTO: 0.01 K/UL (ref 0–0.11)
IMM GRANULOCYTES NFR BLD AUTO: 0.2 % (ref 0–0.9)
LYMPHOCYTES # BLD AUTO: 1.29 K/UL (ref 1–4.8)
LYMPHOCYTES NFR BLD: 31.5 % (ref 22–41)
MCH RBC QN AUTO: 29.7 PG (ref 27–33)
MCHC RBC AUTO-ENTMCNC: 33 G/DL (ref 33.6–35)
MCV RBC AUTO: 90.1 FL (ref 81.4–97.8)
MONOCYTES # BLD AUTO: 0.31 K/UL (ref 0–0.85)
MONOCYTES NFR BLD AUTO: 7.6 % (ref 0–13.4)
NEUTROPHILS # BLD AUTO: 1.88 K/UL (ref 2–7.15)
NEUTROPHILS NFR BLD: 46 % (ref 44–72)
NRBC # BLD AUTO: 0 K/UL
NRBC BLD-RTO: 0 /100 WBC
PLATELET # BLD AUTO: 201 K/UL (ref 164–446)
PMV BLD AUTO: 12.4 FL (ref 9–12.9)
POTASSIUM SERPL-SCNC: 4.2 MMOL/L (ref 3.6–5.5)
PROT SERPL-MCNC: 6.4 G/DL (ref 6–8.2)
RBC # BLD AUTO: 5.35 M/UL (ref 4.2–5.4)
SODIUM SERPL-SCNC: 138 MMOL/L (ref 135–145)
WBC # BLD AUTO: 4.1 K/UL (ref 4.8–10.8)

## 2020-06-05 PROCEDURE — 85025 COMPLETE CBC W/AUTO DIFF WBC: CPT

## 2020-06-05 PROCEDURE — 80053 COMPREHEN METABOLIC PANEL: CPT

## 2020-07-17 ENCOUNTER — HOSPITAL ENCOUNTER (OUTPATIENT)
Dept: RADIOLOGY | Facility: MEDICAL CENTER | Age: 50
End: 2020-07-17
Attending: FAMILY MEDICINE
Payer: COMMERCIAL

## 2020-07-17 ENCOUNTER — HOSPITAL ENCOUNTER (OUTPATIENT)
Dept: LAB | Facility: MEDICAL CENTER | Age: 50
End: 2020-07-17
Attending: NURSE PRACTITIONER
Payer: COMMERCIAL

## 2020-07-17 ENCOUNTER — NON-PROVIDER VISIT (OUTPATIENT)
Dept: INTERNAL MEDICINE | Facility: IMAGING CENTER | Age: 50
End: 2020-07-17
Payer: COMMERCIAL

## 2020-07-17 DIAGNOSIS — Z12.31 VISIT FOR SCREENING MAMMOGRAM: ICD-10-CM

## 2020-07-17 PROCEDURE — 84270 ASSAY OF SEX HORMONE GLOBUL: CPT

## 2020-07-17 PROCEDURE — 84402 ASSAY OF FREE TESTOSTERONE: CPT

## 2020-07-17 PROCEDURE — 84403 ASSAY OF TOTAL TESTOSTERONE: CPT

## 2020-07-17 PROCEDURE — 77067 SCR MAMMO BI INCL CAD: CPT

## 2020-07-23 LAB
SHBG SERPL-SCNC: 36 NMOL/L (ref 30–135)
TESTOST FREE SERPL-MCNC: 16.1 PG/ML (ref 1.1–5.8)
TESTOST SERPL-MCNC: 100 NG/DL (ref 9–55)

## 2020-08-07 ENCOUNTER — OFFICE VISIT (OUTPATIENT)
Dept: DERMATOLOGY | Facility: IMAGING CENTER | Age: 50
End: 2020-08-07
Payer: COMMERCIAL

## 2020-08-07 DIAGNOSIS — L81.4 LENTIGINES: ICD-10-CM

## 2020-08-07 PROCEDURE — 99212 OFFICE O/P EST SF 10 MIN: CPT | Performed by: NURSE PRACTITIONER

## 2020-08-07 ASSESSMENT — ENCOUNTER SYMPTOMS
FEVER: 0
CHILLS: 0
DIAPHORESIS: 0
WEIGHT LOSS: 0

## 2020-08-07 NOTE — PROGRESS NOTES
Dermatology Return Patient Visit    Chief Complaint   Patient presents with   • Skin Lesion       Subjective:     HPI:   Soraida Fregoso is a 50 y.o. female presenting for    Would like some spots on nose checked.  Was a [facility] pt.      HPI/location: tiny brown spots on nose  Time present: noticed one month ago  Painful lesion: No  Itching lesion: No  Enlarging lesion: No  Anything make it better or worse?n/a    History of skin cancer: No  History of precancers/actinic keratoses: No  History of biopsies: yes, benign   History of blistering/severe sunburns:No  Family history of skin cancer:No  Family history of atypical moles:No          Past Medical History:   Diagnosis Date   • Anemia     improved   • Anesthesia     N & V   • ASTHMA    • Depression    • GERD (gastroesophageal reflux disease)    • Grave's disease 8/2010   • Heart burn    • Hiatus hernia syndrome    • Hypothyroid    • Overweight(278.02)    • Polycystic ovaries    • rheumatoid 2000    rheumatoid       Current Outpatient Medications on File Prior to Visit   Medication Sig Dispense Refill   • mometasone (NASONEX) 50 MCG/ACT nasal spray ADMINSTER TWO SPRAYS IN EACH NOSTRIL DAILY 2 Inhaler 4   • losartan (COZAAR) 50 MG Tab TAKE 1 TABLET BY MOUTH EVERY DAY 90 Tab 3   • escitalopram (LEXAPRO) 20 MG tablet Take 1 Tab by mouth every day. 90 Tab 3   • QVAR REDIHALER 80 MCG/ACT inhaler      • levalbuterol (XOPENEX) 0.63 MG/3ML Nebu Soln 3 mL by Nebulization route every 8 hours as needed. Indications: Spasm of Lung Air Passages 24 mL 3   • metronidazole (METROGEL) 0.75 % gel Apply 1 Application to affected area(s) every day. (Patient not taking: Reported on 8/7/2020) 1 Tube 6   • clobetasol (TEMOVATE) 0.05 % Ointment APPLY A THIN LAYER TWICE A DAY X1WEEK THEN DAILY X1WEEK THEN EVERY OTHER DAY X2 WEEKS THEN AS NEEDED  1   • estradiol (ESTRACE) 0.1 MG/GM vaginal cream      • ketoconazole (NIZORAL) 2 % Cream APPLY A THIN LAYER TOPICALLY TO THE AFFECTED AREA ON  "TOES AND FEET TWICE A DAY  12   • terbinafine (LAMISIL) 250 MG Tab TAKE 1 TABLET BY MOUTH EVERY DAY AFTER MEALS  0   • ORENCIA CLICKJECT 125 MG/ML Solution Auto-injector      • SYNTHROID 50 MCG Tab TAKE 2 TABLETS BY MOUTH EVERY  Tab 3   • doxycycline (ORACEA) 40 MG capsule Take 1 Cap by mouth every morning. (Patient not taking: Reported on 8/7/2020) 30 Cap 3   • nystatin-triamcinalone (MYCOLOG) 822758-8.1 UNIT/GM-% Ointment To affected area around the toenails twice daily 1 Tube 1   • Abatacept (ORENCIA SC) Inject  as instructed.     • COD LIVER OIL PO Take  by mouth.     • TESTOSTERONE TD Apply  to skin as directed every 48 hours.     • albuterol 108 (90 BASE) MCG/ACT Aero Soln inhalation aerosol Inhale 2 Puffs by mouth every 6 hours as needed for Shortness of Breath. 8.5 g 12   • Evening Primrose Oil 500 MG Cap Take 1 Cap by mouth 2 Times a Day.     • Estradiol (EVAMIST) 1.53 MG/SPRAY SOLN Apply 1-3 Sprays to skin as directed every day. (Patient taking differently: Apply 2 Sprays to skin as directed every day.) 1 Bottle 0   • leflunomide (ARAVA) 20 MG TABS Take 1 Tab by mouth every day. 90 Tab 3   • Cholecalciferol (VITAMIN D) 2000 UNITS CAPS Take 1 Cap by mouth every day. 30 Cap    • multivitamin (THERAGRAN) per tablet Take 1 Tab by mouth every day.     • cyclosporin (RESTASIS) 0.05 % ophthalmic emulsion Place 1 Drop in both eyes 2 times a day. 1 Each 3     No current facility-administered medications on file prior to visit.        Allergies   Allergen Reactions   • Sulfa Drugs Swelling   • Amoxicillin Rash       Family History   Problem Relation Age of Onset   • Stroke Father 41   • Heart Disease Father         \"hole\" in heart causing stroke   • Alcohol/Drug Brother    • Arthritis Sister         rheumatoid-severe       Social History     Socioeconomic History   • Marital status:      Spouse name: Not on file   • Number of children: Not on file   • Years of education: Not on file   • Highest " education level: Not on file   Occupational History   • Not on file   Social Needs   • Financial resource strain: Not on file   • Food insecurity     Worry: Not on file     Inability: Not on file   • Transportation needs     Medical: Not on file     Non-medical: Not on file   Tobacco Use   • Smoking status: Never Smoker   • Smokeless tobacco: Never Used   Substance and Sexual Activity   • Alcohol use: No     Alcohol/week: 0.0 oz     Comment: twice ayear   • Drug use: No   • Sexual activity: Yes     Partners: Male   Lifestyle   • Physical activity     Days per week: Not on file     Minutes per session: Not on file   • Stress: Not on file   Relationships   • Social connections     Talks on phone: Not on file     Gets together: Not on file     Attends [employer] service: Not on file     Active member of club or organization: Not on file     Attends meetings of clubs or organizations: Not on file     Relationship status: Not on file   • Intimate partner violence     Fear of current or ex partner: Not on file     Emotionally abused: Not on file     Physically abused: Not on file     Forced sexual activity: Not on file   Other Topics Concern   • Not on file   Social History Narrative   • Not on file       Review of Systems   Constitutional: Negative for chills, diaphoresis, fever, malaise/fatigue and weight loss.   Skin: Negative for itching and rash.        Objective:     A focused cutaneous exam was completed including: face, eyelids, lips and neck with the following pertinent findings listed below. Remaining above-listed examined areas within normal limits / negative for rashes or lesions.      There were no vitals taken for this visit.    Physical Exam   Constitutional: She is oriented to person, place, and time and well-developed, well-nourished, and in no distress. No distress.   HENT:   Head: Normocephalic.       Pulmonary/Chest: Effort normal.   Neurological: She is alert and oriented to person, place, and time.    Skin: Skin is warm and dry. No rash noted. No erythema.   Psychiatric: Mood normal.       DATA: none applicable to review    Assessment and Plan:     1. Lentigines  - Discussed benign nature of lesions, related to sun exposure  - Discussed sun protection.        Followup: Return for annual skin checks or sooner for any concerns.    OLIVIA Hardin.

## 2020-09-25 ENCOUNTER — HOSPITAL ENCOUNTER (OUTPATIENT)
Facility: MEDICAL CENTER | Age: 50
End: 2020-09-25
Attending: NURSE PRACTITIONER
Payer: COMMERCIAL

## 2020-09-25 ENCOUNTER — NON-PROVIDER VISIT (OUTPATIENT)
Dept: INTERNAL MEDICINE | Facility: IMAGING CENTER | Age: 50
End: 2020-09-25
Payer: COMMERCIAL

## 2020-09-25 ENCOUNTER — HOSPITAL ENCOUNTER (OUTPATIENT)
Facility: MEDICAL CENTER | Age: 50
End: 2020-09-25
Attending: SPECIALIST
Payer: COMMERCIAL

## 2020-09-25 LAB
ALBUMIN SERPL BCP-MCNC: 4 G/DL (ref 3.2–4.9)
ALBUMIN/GLOB SERPL: 1.7 G/DL
ALP SERPL-CCNC: 87 U/L (ref 30–99)
ALT SERPL-CCNC: 27 U/L (ref 2–50)
ANION GAP SERPL CALC-SCNC: 12 MMOL/L (ref 7–16)
AST SERPL-CCNC: 29 U/L (ref 12–45)
BASOPHILS # BLD AUTO: 0.8 % (ref 0–1.8)
BASOPHILS # BLD: 0.03 K/UL (ref 0–0.12)
BILIRUB SERPL-MCNC: 0.5 MG/DL (ref 0.1–1.5)
BUN SERPL-MCNC: 14 MG/DL (ref 8–22)
CALCIUM SERPL-MCNC: 9.2 MG/DL (ref 8.5–10.5)
CHLORIDE SERPL-SCNC: 105 MMOL/L (ref 96–112)
CO2 SERPL-SCNC: 24 MMOL/L (ref 20–33)
CREAT SERPL-MCNC: 0.74 MG/DL (ref 0.5–1.4)
EOSINOPHIL # BLD AUTO: 0.25 K/UL (ref 0–0.51)
EOSINOPHIL NFR BLD: 6.8 % (ref 0–6.9)
ERYTHROCYTE [DISTWIDTH] IN BLOOD BY AUTOMATED COUNT: 45.4 FL (ref 35.9–50)
GLOBULIN SER CALC-MCNC: 2.3 G/DL (ref 1.9–3.5)
GLUCOSE SERPL-MCNC: 88 MG/DL (ref 65–99)
HCT VFR BLD AUTO: 47.9 % (ref 37–47)
HGB BLD-MCNC: 15.1 G/DL (ref 12–16)
IMM GRANULOCYTES # BLD AUTO: 0.01 K/UL (ref 0–0.11)
IMM GRANULOCYTES NFR BLD AUTO: 0.3 % (ref 0–0.9)
LYMPHOCYTES # BLD AUTO: 1.24 K/UL (ref 1–4.8)
LYMPHOCYTES NFR BLD: 33.9 % (ref 22–41)
MCH RBC QN AUTO: 29.3 PG (ref 27–33)
MCHC RBC AUTO-ENTMCNC: 31.5 G/DL (ref 33.6–35)
MCV RBC AUTO: 92.8 FL (ref 81.4–97.8)
MONOCYTES # BLD AUTO: 0.34 K/UL (ref 0–0.85)
MONOCYTES NFR BLD AUTO: 9.3 % (ref 0–13.4)
NEUTROPHILS # BLD AUTO: 1.79 K/UL (ref 2–7.15)
NEUTROPHILS NFR BLD: 48.9 % (ref 44–72)
NRBC # BLD AUTO: 0 K/UL
NRBC BLD-RTO: 0 /100 WBC
PLATELET # BLD AUTO: 196 K/UL (ref 164–446)
PMV BLD AUTO: 12 FL (ref 9–12.9)
POTASSIUM SERPL-SCNC: 4 MMOL/L (ref 3.6–5.5)
PROT SERPL-MCNC: 6.3 G/DL (ref 6–8.2)
RBC # BLD AUTO: 5.16 M/UL (ref 4.2–5.4)
SODIUM SERPL-SCNC: 141 MMOL/L (ref 135–145)
TESTOST SERPL-MCNC: 20 NG/DL (ref 9–75)
WBC # BLD AUTO: 3.7 K/UL (ref 4.8–10.8)

## 2020-09-25 PROCEDURE — 84403 ASSAY OF TOTAL TESTOSTERONE: CPT

## 2020-09-25 PROCEDURE — 84402 ASSAY OF FREE TESTOSTERONE: CPT

## 2020-09-25 PROCEDURE — 85025 COMPLETE CBC W/AUTO DIFF WBC: CPT

## 2020-09-25 PROCEDURE — 80053 COMPREHEN METABOLIC PANEL: CPT

## 2020-10-01 LAB — TESTOST FREE SERPL-MCNC: 2.7 PG/ML (ref 1.1–5.8)

## 2021-02-05 ENCOUNTER — HOSPITAL ENCOUNTER (OUTPATIENT)
Facility: MEDICAL CENTER | Age: 51
End: 2021-02-05
Attending: SPECIALIST
Payer: COMMERCIAL

## 2021-02-05 ENCOUNTER — HOSPITAL ENCOUNTER (OUTPATIENT)
Facility: MEDICAL CENTER | Age: 51
End: 2021-02-05
Attending: FAMILY MEDICINE
Payer: COMMERCIAL

## 2021-02-05 ENCOUNTER — OFFICE VISIT (OUTPATIENT)
Dept: INTERNAL MEDICINE | Facility: IMAGING CENTER | Age: 51
End: 2021-02-05
Payer: COMMERCIAL

## 2021-02-05 VITALS
RESPIRATION RATE: 17 BRPM | DIASTOLIC BLOOD PRESSURE: 61 MMHG | HEART RATE: 69 BPM | TEMPERATURE: 97.9 F | OXYGEN SATURATION: 97 % | WEIGHT: 215 LBS | HEIGHT: 67 IN | SYSTOLIC BLOOD PRESSURE: 119 MMHG | BODY MASS INDEX: 33.74 KG/M2

## 2021-02-05 DIAGNOSIS — Z00.00 HEALTH CARE MAINTENANCE: ICD-10-CM

## 2021-02-05 DIAGNOSIS — M25.562 POSTERIOR KNEE PAIN, LEFT: ICD-10-CM

## 2021-02-05 DIAGNOSIS — E78.2 MIXED HYPERLIPIDEMIA WITH APOLIPOPROTEIN E4 VARIANT: Chronic | ICD-10-CM

## 2021-02-05 DIAGNOSIS — F51.01 PRIMARY INSOMNIA: ICD-10-CM

## 2021-02-05 DIAGNOSIS — F34.1 DYSTHYMIC DISORDER: Chronic | ICD-10-CM

## 2021-02-05 DIAGNOSIS — Z15.89 HETEROZYGOUS MTHFR MUTATION A1298C: ICD-10-CM

## 2021-02-05 DIAGNOSIS — J45.20 MILD INTERMITTENT ASTHMA WITHOUT COMPLICATION: Chronic | ICD-10-CM

## 2021-02-05 DIAGNOSIS — Z79.890 PREMATURE SURGICAL MENOPAUSE ON HRT: Chronic | ICD-10-CM

## 2021-02-05 DIAGNOSIS — K21.9 GASTROESOPHAGEAL REFLUX DISEASE WITHOUT ESOPHAGITIS: Chronic | ICD-10-CM

## 2021-02-05 DIAGNOSIS — Z00.00 WELLNESS EXAMINATION: ICD-10-CM

## 2021-02-05 DIAGNOSIS — I10 ESSENTIAL HYPERTENSION: Chronic | ICD-10-CM

## 2021-02-05 DIAGNOSIS — E66.9 OBESITY (BMI 30-39.9): ICD-10-CM

## 2021-02-05 DIAGNOSIS — E03.9 ACQUIRED HYPOTHYROIDISM: Chronic | ICD-10-CM

## 2021-02-05 DIAGNOSIS — Z15.89 HETEROZYGOUS MTHFR MUTATION C677T: ICD-10-CM

## 2021-02-05 DIAGNOSIS — E55.9 VITAMIN D DEFICIENCY: Chronic | ICD-10-CM

## 2021-02-05 DIAGNOSIS — E89.40 PREMATURE SURGICAL MENOPAUSE ON HRT: Chronic | ICD-10-CM

## 2021-02-05 DIAGNOSIS — M05.79 RHEUMATOID ARTHRITIS INVOLVING MULTIPLE SITES WITH POSITIVE RHEUMATOID FACTOR (HCC): Chronic | ICD-10-CM

## 2021-02-05 PROCEDURE — 84443 ASSAY THYROID STIM HORMONE: CPT

## 2021-02-05 PROCEDURE — 80053 COMPREHEN METABOLIC PANEL: CPT

## 2021-02-05 PROCEDURE — 80061 LIPID PANEL: CPT

## 2021-02-05 PROCEDURE — 85025 COMPLETE CBC W/AUTO DIFF WBC: CPT

## 2021-02-05 PROCEDURE — 83036 HEMOGLOBIN GLYCOSYLATED A1C: CPT

## 2021-02-05 PROCEDURE — 99396 PREV VISIT EST AGE 40-64: CPT | Performed by: FAMILY MEDICINE

## 2021-02-05 PROCEDURE — 82306 VITAMIN D 25 HYDROXY: CPT

## 2021-02-05 ASSESSMENT — FIBROSIS 4 INDEX: FIB4 SCORE: 1.395263150541595598

## 2021-02-05 NOTE — PROGRESS NOTES
Chief Complaint   Patient presents with   • Annual Wellness Visit     Annual labs and Dr. De Luna's order   • Knee Pain     Left knee, behind the knee. Tender to palpation and intermittently   • Medication Refill     Synthroid, losartan, lexapro       HPI:  Patient is a 50 y.o. female established patient who presents today for her annual wellness exam and to have fasting labs drawn. She has chronic dysthymia controlled with ongoing Lexapro use, and chronic essential HTN with daily Losartan use. She is on post menopausal HRT with good results, and chronic RA managed closely by Dr. De Luna. She has acquired hypothyroidism with ongoing Synthroid use, and GERD without PPI use. She continues to have intermittent pain behind medial aspect of left knee. I evaluated the condition one year ago and felt that it was due to hyperextension of left knee joint. She reports that pain is still present with compression of area but does not affect walking, does not swell, and is not related to trauma/inactivity. She endorses 100% medication compliance and is in good spirits today.     Patient Active Problem List    Diagnosis Date Noted   • Obesity (BMI 30-39.9) 01/24/2020   • Primary insomnia 10/07/2018   • Rheumatoid arthritis involving multiple sites with positive rheumatoid factor (HCC) 05/17/2017   • Essential hypertension 11/11/2016   • Family history of stroke 04/13/2016   • Hypothyroidism 08/28/2015   • Heterozygous MTHFR mutation [id number] ([facility]) 11/07/2014   • Heterozygous MTHFR mutation C677T ([facility]) 11/07/2014   • Gluten intolerance 05/16/2014   • Mixed hyperlipidemia with apolipoprotein E4 variant 05/16/2014   • Vitamin D deficiency disease 05/16/2014   • GERD (gastroesophageal reflux disease)    • Polycystic ovaries    • Dysthymic disorder 11/07/2012   • Asthma 11/07/2012   • Premature surgical menopause on HRT 11/07/2012   • Dry eyes 11/07/2012       Past medical, surgical, family, and social history was reviewed and updated in  "Epic chart by me today.     Medications and allergies reviewed and updated in Epic chart by me today.     ROS:  Pertinent positives listed above in HPI. All other systems have been reviewed and are negative.    PE:   /61 (BP Location: Left arm, Patient Position: Sitting, BP Cuff Size: Adult)   Pulse 69   Temp 36.6 °C (97.9 °F) (Temporal)   Resp 17   Ht 1.702 m (5' 7\")   Wt 97.5 kg (215 lb)   LMP 10/06/2010   SpO2 97%   BMI 33.67 kg/m²   Vital signs reviewed with patient.     Gen: Well developed; well nourished; no acute distress; age appropriate appearance   HEENT: Normocephalic; atraumatic; PEERLA b/l; sclera clear b/l; b/l external auditory canals WNL; b/l TM WNL; nares patent; oropharynx clear   Neck: No adenopathy; no thyromegaly  CV: Regular rate and rhythm; S1/ S2 present; no murmur, gallop or rub noted  Pulm: No respiratory distress; clear to ascultation b/l; no wheezing or stridor noted b/l  Abd: Adequate bowel sounds noted; soft and nontender; no rebound, rigidity, nor distention   Extremities: No peripheral edema b/l LE extremities/ no clubbing nor cyanosis noted; no distortion of left knee joint - no edema nor skin changes noted; mild discomfort with palpation in medical aspect of posterior fossa of left knee  Skin: Warm and dry; no rashes noted   Neuro: No focal deficits noted; normal gait observed   Psych: AAOx4; mood and affect are appropriate      A/P:  1. Mixed hyperlipidemia with apolipoprotein E4 variant  Patient is due for fasting lipid panel today and is currently not taking statin medication.   - Lipid Profile; Future    2. Vitamin D deficiency disease  Patient is to continue current Vitamin D supplementation and is due for Vitamin D level today.   - VITAMIN D,25 HYDROXY; Future    3. Acquired hypothyroidism  Patient is to continue current Synthroid use and is due for thyroid labs today.   - TSH WITH REFLEX TO FT4; Future    4. Health care maintenance  - HEMOGLOBIN A1C; " Future    5. Posterior knee pain, left  Ongoing issue for patient for the past year - recommend patient obtain US of left lower leg for further medical management.   - US-EXTREMITY VENOUS LOWER UNILAT LEFT; Future    6. Dysthymic disorder  Stable without current safety issues. Recommend patient continue current Lexapro use.     7. Mild intermittent asthma without complication  Stable without recent exacerbation.     8. Premature surgical menopause on HRT  Stable/ recommend patient continue current HRT use - she continues to benefit from HRT use.     9. Gastroesophageal reflux disease without esophagitis  Stable without PPI use.     10. Essential hypertension  Stable/ recommend patient continue current Losartan use.     11. Rheumatoid arthritis involving multiple sites with positive rheumatoid factor (HCC)  Stable/ condition managed by Dr. De Luna    12. Heterozygous MTHFR mutation [id number] (HCC)  Stable    13. Heterozygous MTHFR mutation C677T (HCC)  Stable    14. Primary insomnia  Stable    15. Obesity (BMI 30-39.9)  - Patient identified as having weight management issue.  Appropriate orders and counseling given.    16. Wellness examination  Patient is stable and remains well informed about chronic medical conditions that need additional attention moving forward.

## 2021-02-06 LAB
25(OH)D3 SERPL-MCNC: 46 NG/ML (ref 30–100)
ALBUMIN SERPL BCP-MCNC: 4.1 G/DL (ref 3.2–4.9)
ALBUMIN/GLOB SERPL: 1.6 G/DL
ALP SERPL-CCNC: 87 U/L (ref 30–99)
ALT SERPL-CCNC: 21 U/L (ref 2–50)
ANION GAP SERPL CALC-SCNC: 10 MMOL/L (ref 7–16)
AST SERPL-CCNC: 29 U/L (ref 12–45)
BASOPHILS # BLD AUTO: 0.6 % (ref 0–1.8)
BASOPHILS # BLD: 0.02 K/UL (ref 0–0.12)
BILIRUB SERPL-MCNC: 0.6 MG/DL (ref 0.1–1.5)
BUN SERPL-MCNC: 15 MG/DL (ref 8–22)
CALCIUM SERPL-MCNC: 9.2 MG/DL (ref 8.5–10.5)
CHLORIDE SERPL-SCNC: 104 MMOL/L (ref 96–112)
CHOLEST SERPL-MCNC: 174 MG/DL (ref 100–199)
CO2 SERPL-SCNC: 24 MMOL/L (ref 20–33)
CREAT SERPL-MCNC: 0.71 MG/DL (ref 0.5–1.4)
EOSINOPHIL # BLD AUTO: 0.21 K/UL (ref 0–0.51)
EOSINOPHIL NFR BLD: 6 % (ref 0–6.9)
ERYTHROCYTE [DISTWIDTH] IN BLOOD BY AUTOMATED COUNT: 44.4 FL (ref 35.9–50)
EST. AVERAGE GLUCOSE BLD GHB EST-MCNC: 111 MG/DL
GLOBULIN SER CALC-MCNC: 2.6 G/DL (ref 1.9–3.5)
GLUCOSE SERPL-MCNC: 103 MG/DL (ref 65–99)
HBA1C MFR BLD: 5.5 % (ref 0–5.6)
HCT VFR BLD AUTO: 46.6 % (ref 37–47)
HDLC SERPL-MCNC: 62 MG/DL
HGB BLD-MCNC: 15.1 G/DL (ref 12–16)
IMM GRANULOCYTES # BLD AUTO: 0 K/UL (ref 0–0.11)
IMM GRANULOCYTES NFR BLD AUTO: 0 % (ref 0–0.9)
LDLC SERPL CALC-MCNC: 99 MG/DL
LYMPHOCYTES # BLD AUTO: 1.14 K/UL (ref 1–4.8)
LYMPHOCYTES NFR BLD: 32.4 % (ref 22–41)
MCH RBC QN AUTO: 29.8 PG (ref 27–33)
MCHC RBC AUTO-ENTMCNC: 32.4 G/DL (ref 33.6–35)
MCV RBC AUTO: 92.1 FL (ref 81.4–97.8)
MONOCYTES # BLD AUTO: 0.37 K/UL (ref 0–0.85)
MONOCYTES NFR BLD AUTO: 10.5 % (ref 0–13.4)
NEUTROPHILS # BLD AUTO: 1.78 K/UL (ref 2–7.15)
NEUTROPHILS NFR BLD: 50.5 % (ref 44–72)
NRBC # BLD AUTO: 0 K/UL
NRBC BLD-RTO: 0 /100 WBC
PLATELET # BLD AUTO: 184 K/UL (ref 164–446)
PMV BLD AUTO: 13 FL (ref 9–12.9)
POTASSIUM SERPL-SCNC: 4 MMOL/L (ref 3.6–5.5)
PROT SERPL-MCNC: 6.7 G/DL (ref 6–8.2)
RBC # BLD AUTO: 5.06 M/UL (ref 4.2–5.4)
SODIUM SERPL-SCNC: 138 MMOL/L (ref 135–145)
TRIGL SERPL-MCNC: 64 MG/DL (ref 0–149)
TSH SERPL DL<=0.005 MIU/L-ACNC: 0.94 UIU/ML (ref 0.38–5.33)
WBC # BLD AUTO: 3.5 K/UL (ref 4.8–10.8)

## 2021-02-07 DIAGNOSIS — F34.1 DYSTHYMIC DISORDER: Chronic | ICD-10-CM

## 2021-02-07 DIAGNOSIS — I10 ESSENTIAL HYPERTENSION: ICD-10-CM

## 2021-02-07 DIAGNOSIS — E03.9 HYPOTHYROIDISM, UNSPECIFIED TYPE: ICD-10-CM

## 2021-02-07 RX ORDER — ESCITALOPRAM OXALATE 20 MG/1
20 TABLET ORAL DAILY
Qty: 90 TAB | Refills: 3 | Status: SHIPPED | OUTPATIENT
Start: 2021-02-07 | End: 2022-03-24

## 2021-02-07 RX ORDER — LEVOTHYROXINE SODIUM 100 MCG
100 TABLET ORAL
Qty: 90 TAB | Refills: 3 | Status: SHIPPED | OUTPATIENT
Start: 2021-02-07 | End: 2021-06-02

## 2021-02-07 RX ORDER — LOSARTAN POTASSIUM 50 MG/1
50 TABLET ORAL
Qty: 90 TAB | Refills: 3 | Status: SHIPPED | OUTPATIENT
Start: 2021-02-07 | End: 2022-03-24

## 2021-02-07 RX ORDER — LEVOTHYROXINE SODIUM 0.1 MG/1
100 TABLET ORAL
Qty: 90 TAB | Refills: 3 | Status: SHIPPED | OUTPATIENT
Start: 2021-02-07 | End: 2021-02-07

## 2021-02-10 ENCOUNTER — HOSPITAL ENCOUNTER (OUTPATIENT)
Dept: RADIOLOGY | Facility: MEDICAL CENTER | Age: 51
End: 2021-02-10
Attending: FAMILY MEDICINE
Payer: COMMERCIAL

## 2021-02-10 DIAGNOSIS — M25.562 POSTERIOR KNEE PAIN, LEFT: ICD-10-CM

## 2021-02-10 PROCEDURE — 93971 EXTREMITY STUDY: CPT | Mod: LT

## 2021-02-23 DIAGNOSIS — J30.89 OTHER ALLERGIC RHINITIS: ICD-10-CM

## 2021-02-23 DIAGNOSIS — J06.9 UPPER RESPIRATORY TRACT INFECTION, UNSPECIFIED TYPE: ICD-10-CM

## 2021-02-23 RX ORDER — MOMETASONE FUROATE 50 UG/1
SPRAY, METERED NASAL
Qty: 17 G | Refills: 3 | Status: SHIPPED | OUTPATIENT
Start: 2021-02-23 | End: 2021-09-13 | Stop reason: SDUPTHER

## 2021-06-02 ENCOUNTER — TELEPHONE (OUTPATIENT)
Dept: INTERNAL MEDICINE | Facility: IMAGING CENTER | Age: 51
End: 2021-06-02

## 2021-06-02 DIAGNOSIS — E03.9 HYPOTHYROIDISM, UNSPECIFIED TYPE: ICD-10-CM

## 2021-06-02 RX ORDER — LEVOTHYROXINE SODIUM 50 MCG
TABLET ORAL
Qty: 180 TABLET | Refills: 2 | Status: SHIPPED | OUTPATIENT
Start: 2021-06-02 | End: 2022-02-15

## 2021-06-02 NOTE — TELEPHONE ENCOUNTER
"Patient states that she does not tolerate \"medicaiton dyes.\"  Instead of levothyroxine 100 mcg, she needs levothyroxine 50 mcg x 2.  "

## 2021-07-23 ENCOUNTER — HOSPITAL ENCOUNTER (OUTPATIENT)
Dept: RADIOLOGY | Facility: MEDICAL CENTER | Age: 51
End: 2021-07-23
Attending: FAMILY MEDICINE
Payer: COMMERCIAL

## 2021-07-23 DIAGNOSIS — Z12.31 ENCOUNTER FOR MAMMOGRAM TO ESTABLISH BASELINE MAMMOGRAM: ICD-10-CM

## 2021-07-23 PROCEDURE — 77063 BREAST TOMOSYNTHESIS BI: CPT

## 2021-09-13 DIAGNOSIS — T75.3XXS SEA SICKNESS, SEQUELA: ICD-10-CM

## 2021-09-13 DIAGNOSIS — J06.9 UPPER RESPIRATORY TRACT INFECTION, UNSPECIFIED TYPE: ICD-10-CM

## 2021-09-13 DIAGNOSIS — J30.89 OTHER ALLERGIC RHINITIS: ICD-10-CM

## 2021-09-13 RX ORDER — SCOLOPAMINE TRANSDERMAL SYSTEM 1 MG/1
1 PATCH, EXTENDED RELEASE TRANSDERMAL
Qty: 4 PATCH | Refills: 3 | Status: SHIPPED | OUTPATIENT
Start: 2021-09-13 | End: 2022-07-03

## 2021-09-13 RX ORDER — MOMETASONE FUROATE 50 UG/1
2 SPRAY, METERED NASAL DAILY
Qty: 17 G | Refills: 6 | Status: SHIPPED | OUTPATIENT
Start: 2021-09-13 | End: 2022-06-08 | Stop reason: SDUPTHER

## 2021-09-17 ENCOUNTER — HOSPITAL ENCOUNTER (OUTPATIENT)
Facility: MEDICAL CENTER | Age: 51
End: 2021-09-17
Attending: SPECIALIST
Payer: COMMERCIAL

## 2021-09-17 ENCOUNTER — OFFICE VISIT (OUTPATIENT)
Dept: INTERNAL MEDICINE | Facility: IMAGING CENTER | Age: 51
End: 2021-09-17
Payer: COMMERCIAL

## 2021-09-17 VITALS
DIASTOLIC BLOOD PRESSURE: 62 MMHG | HEART RATE: 72 BPM | RESPIRATION RATE: 17 BRPM | HEIGHT: 67 IN | OXYGEN SATURATION: 95 % | WEIGHT: 214.95 LBS | BODY MASS INDEX: 33.74 KG/M2 | SYSTOLIC BLOOD PRESSURE: 122 MMHG | TEMPERATURE: 97.9 F

## 2021-09-17 DIAGNOSIS — R87.629 ABNORMAL PAP SMEAR OF VAGINA: ICD-10-CM

## 2021-09-17 DIAGNOSIS — Z12.11 COLON CANCER SCREENING: ICD-10-CM

## 2021-09-17 DIAGNOSIS — Z71.85 VACCINE COUNSELING: ICD-10-CM

## 2021-09-17 DIAGNOSIS — E03.9 HYPOTHYROIDISM, UNSPECIFIED TYPE: ICD-10-CM

## 2021-09-17 PROCEDURE — 80053 COMPREHEN METABOLIC PANEL: CPT

## 2021-09-17 PROCEDURE — 84443 ASSAY THYROID STIM HORMONE: CPT

## 2021-09-17 PROCEDURE — 85025 COMPLETE CBC W/AUTO DIFF WBC: CPT

## 2021-09-17 PROCEDURE — 99214 OFFICE O/P EST MOD 30 MIN: CPT | Performed by: FAMILY MEDICINE

## 2021-09-17 ASSESSMENT — FIBROSIS 4 INDEX: FIB4 SCORE: 1.72

## 2021-09-17 NOTE — PROGRESS NOTES
Chief Complaint   Patient presents with   • Results     Was told she has HPV by Dr. Harris. She had pap smear at end of July. Dr. Hernanedz was seen on Wednesday also to discuss results.   • Referral Needed     GI for colonoscopy       HPI:  Patient is a 51 y.o. female established patient who presents today to discuss her current health concerns. She has history of robotic hysterectomy in 2010 by Dr. Harris and went for her yearly GYN exam in July. She was informed that she was HPV positive (type unknown) and had abnormal cells present. She saw Dr. Hernandez, who is now practicing at  [facility], on Wednesday, and she initially recommended a colposcopy (until learning that patient did not have a cervix remaining). They discussed the situation at length after repeat GYN exam was done, and plan is to repeat GYN exam in one year. Patient is seeking further clarification about this situation and is due for screening colonoscopy. She has lab orders from Dr. De Luna to be drawn today and is in good spirits overall.     Patient Active Problem List    Diagnosis Date Noted   • Obesity (BMI 30-39.9) 01/24/2020   • Primary insomnia 10/07/2018   • Rheumatoid arthritis involving multiple sites with positive rheumatoid factor (HCC) 05/17/2017   • Essential hypertension 11/11/2016   • Family history of stroke 04/13/2016   • Hypothyroidism 08/28/2015   • Heterozygous MTHFR mutation [id number] 11/07/2014   • Heterozygous MTHFR mutation C677T 11/07/2014   • Gluten intolerance 05/16/2014   • Mixed hyperlipidemia with apolipoprotein E4 variant 05/16/2014   • Vitamin D deficiency disease 05/16/2014   • GERD (gastroesophageal reflux disease)    • Polycystic ovaries    • Dysthymic disorder 11/07/2012   • Asthma 11/07/2012   • Premature surgical menopause on HRT 11/07/2012   • Dry eyes 11/07/2012       Past medical, surgical, family, and social history was reviewed and updated in Epic chart by me today.     Medications and allergies  "reviewed and updated in Epic chart by me today.     ROS:  Pertinent positives listed above in HPI. All other systems have been reviewed and are negative.    PE:   /62 (BP Location: Left arm, Patient Position: Sitting, BP Cuff Size: Adult)   Pulse 72   Temp 36.6 °C (97.9 °F) (Temporal)   Resp 17   Ht 1.702 m (5' 7\")   Wt 97.5 kg (214 lb 15.2 oz)   LMP 10/06/2010   SpO2 95%   BMI 33.67 kg/m²   Vital signs reviewed with patient.     Gen: Well developed; well nourished; no acute distress; age appropriate appearance   Skin: Warm and dry; no rashes noted   Neuro: No focal deficits noted   Psych: AAOx4; mood and affect are appropriate    A/P:  1. Abnormal Pap smear of vagina  Patient has history of hysterectomy in 2010 by Dr. Harris and had annual GYN exam done this summer. Refer to HPI for details, and I will request records to help clarify patient's questions and concerns.     2. Hypothyroidism, unspecified type  Recommend patient continue current Synthroid use and will recheck labs today.   - TSH WITH REFLEX TO FT4; Future    3. Colon cancer screening  Patient is due for screening colonoscopy, and referral made to [facility].   - REFERRAL TO GI FOR COLONOSCOPY    4. Vaccine counseling  Patient educated about Shingrix eligibility at visit today.           "

## 2021-09-18 LAB
ALBUMIN SERPL BCP-MCNC: 4.1 G/DL (ref 3.2–4.9)
ALBUMIN/GLOB SERPL: 1.5 G/DL
ALP SERPL-CCNC: 105 U/L (ref 30–99)
ALT SERPL-CCNC: 27 U/L (ref 2–50)
ANION GAP SERPL CALC-SCNC: 11 MMOL/L (ref 7–16)
AST SERPL-CCNC: 35 U/L (ref 12–45)
BASOPHILS # BLD AUTO: 0.8 % (ref 0–1.8)
BASOPHILS # BLD: 0.03 K/UL (ref 0–0.12)
BILIRUB SERPL-MCNC: 0.4 MG/DL (ref 0.1–1.5)
BUN SERPL-MCNC: 11 MG/DL (ref 8–22)
CALCIUM SERPL-MCNC: 9.4 MG/DL (ref 8.5–10.5)
CHLORIDE SERPL-SCNC: 106 MMOL/L (ref 96–112)
CO2 SERPL-SCNC: 26 MMOL/L (ref 20–33)
CREAT SERPL-MCNC: 0.72 MG/DL (ref 0.5–1.4)
EOSINOPHIL # BLD AUTO: 0.15 K/UL (ref 0–0.51)
EOSINOPHIL NFR BLD: 4 % (ref 0–6.9)
ERYTHROCYTE [DISTWIDTH] IN BLOOD BY AUTOMATED COUNT: 46 FL (ref 35.9–50)
GLOBULIN SER CALC-MCNC: 2.8 G/DL (ref 1.9–3.5)
GLUCOSE SERPL-MCNC: 97 MG/DL (ref 65–99)
HCT VFR BLD AUTO: 48.5 % (ref 37–47)
HGB BLD-MCNC: 15 G/DL (ref 12–16)
IMM GRANULOCYTES # BLD AUTO: 0.01 K/UL (ref 0–0.11)
IMM GRANULOCYTES NFR BLD AUTO: 0.3 % (ref 0–0.9)
LYMPHOCYTES # BLD AUTO: 1.03 K/UL (ref 1–4.8)
LYMPHOCYTES NFR BLD: 27.6 % (ref 22–41)
MCH RBC QN AUTO: 29.2 PG (ref 27–33)
MCHC RBC AUTO-ENTMCNC: 30.9 G/DL (ref 33.6–35)
MCV RBC AUTO: 94.5 FL (ref 81.4–97.8)
MONOCYTES # BLD AUTO: 0.37 K/UL (ref 0–0.85)
MONOCYTES NFR BLD AUTO: 9.9 % (ref 0–13.4)
NEUTROPHILS # BLD AUTO: 2.14 K/UL (ref 2–7.15)
NEUTROPHILS NFR BLD: 57.4 % (ref 44–72)
NRBC # BLD AUTO: 0 K/UL
NRBC BLD-RTO: 0 /100 WBC
PLATELET # BLD AUTO: 254 K/UL (ref 164–446)
PMV BLD AUTO: 11.4 FL (ref 9–12.9)
POTASSIUM SERPL-SCNC: 3.9 MMOL/L (ref 3.6–5.5)
PROT SERPL-MCNC: 6.9 G/DL (ref 6–8.2)
RBC # BLD AUTO: 5.13 M/UL (ref 4.2–5.4)
SODIUM SERPL-SCNC: 143 MMOL/L (ref 135–145)
TSH SERPL DL<=0.005 MIU/L-ACNC: 1.74 UIU/ML (ref 0.38–5.33)
WBC # BLD AUTO: 3.7 K/UL (ref 4.8–10.8)

## 2021-10-11 DIAGNOSIS — E03.9 ACQUIRED HYPOTHYROIDISM: ICD-10-CM

## 2021-10-11 RX ORDER — LEVOTHYROXINE SODIUM 125 MCG
125 TABLET ORAL
Qty: 90 TABLET | Refills: 0 | Status: SHIPPED | OUTPATIENT
Start: 2021-10-11 | End: 2021-12-15

## 2021-11-18 DIAGNOSIS — E03.9 ACQUIRED HYPOTHYROIDISM: ICD-10-CM

## 2021-11-19 ENCOUNTER — HOSPITAL ENCOUNTER (OUTPATIENT)
Facility: MEDICAL CENTER | Age: 51
End: 2021-11-19
Attending: FAMILY MEDICINE
Payer: COMMERCIAL

## 2021-11-19 ENCOUNTER — NON-PROVIDER VISIT (OUTPATIENT)
Dept: INTERNAL MEDICINE | Facility: IMAGING CENTER | Age: 51
End: 2021-11-19
Payer: COMMERCIAL

## 2021-11-19 DIAGNOSIS — E03.9 ACQUIRED HYPOTHYROIDISM: ICD-10-CM

## 2021-11-19 DIAGNOSIS — Z23 NEED FOR VACCINATION: ICD-10-CM

## 2021-11-19 LAB
T4 FREE SERPL-MCNC: 1.38 NG/DL (ref 0.93–1.7)
TSH SERPL DL<=0.005 MIU/L-ACNC: 0.62 UIU/ML (ref 0.38–5.33)

## 2021-11-19 PROCEDURE — 90471 IMMUNIZATION ADMIN: CPT | Performed by: FAMILY MEDICINE

## 2021-11-19 PROCEDURE — 90750 HZV VACC RECOMBINANT IM: CPT | Performed by: FAMILY MEDICINE

## 2021-11-19 PROCEDURE — 84443 ASSAY THYROID STIM HORMONE: CPT

## 2021-11-19 PROCEDURE — 84439 ASSAY OF FREE THYROXINE: CPT

## 2021-12-14 DIAGNOSIS — E03.9 ACQUIRED HYPOTHYROIDISM: ICD-10-CM

## 2021-12-15 RX ORDER — LEVOTHYROXINE SODIUM 125 MCG
TABLET ORAL
Qty: 90 TABLET | Refills: 2 | Status: SHIPPED | OUTPATIENT
Start: 2021-12-15 | End: 2022-04-25 | Stop reason: SDUPTHER

## 2022-01-21 DIAGNOSIS — E03.9 ACQUIRED HYPOTHYROIDISM: ICD-10-CM

## 2022-01-21 RX ORDER — LEVOTHYROXINE SODIUM 125 MCG
125 TABLET ORAL
Qty: 90 TABLET | Refills: 1 | Status: SHIPPED | OUTPATIENT
Start: 2022-01-21 | End: 2022-02-15

## 2022-02-04 ENCOUNTER — NON-PROVIDER VISIT (OUTPATIENT)
Dept: INTERNAL MEDICINE | Facility: IMAGING CENTER | Age: 52
End: 2022-02-04
Payer: COMMERCIAL

## 2022-02-04 DIAGNOSIS — Z23 NEED FOR VACCINATION: ICD-10-CM

## 2022-02-04 PROCEDURE — 90471 IMMUNIZATION ADMIN: CPT | Performed by: INTERNAL MEDICINE

## 2022-02-04 PROCEDURE — 90750 HZV VACC RECOMBINANT IM: CPT | Performed by: INTERNAL MEDICINE

## 2022-02-11 ENCOUNTER — OFFICE VISIT (OUTPATIENT)
Dept: INTERNAL MEDICINE | Facility: IMAGING CENTER | Age: 52
End: 2022-02-11
Payer: COMMERCIAL

## 2022-02-11 VITALS
SYSTOLIC BLOOD PRESSURE: 126 MMHG | DIASTOLIC BLOOD PRESSURE: 62 MMHG | OXYGEN SATURATION: 94 % | BODY MASS INDEX: 33.59 KG/M2 | HEIGHT: 67 IN | HEART RATE: 69 BPM | TEMPERATURE: 97.9 F | RESPIRATION RATE: 17 BRPM | WEIGHT: 214 LBS

## 2022-02-11 DIAGNOSIS — E03.9 ACQUIRED HYPOTHYROIDISM: Chronic | ICD-10-CM

## 2022-02-11 DIAGNOSIS — E78.2 MIXED HYPERLIPIDEMIA WITH APOLIPOPROTEIN E4 VARIANT: Chronic | ICD-10-CM

## 2022-02-11 DIAGNOSIS — Z00.00 HEALTH CARE MAINTENANCE: ICD-10-CM

## 2022-02-11 DIAGNOSIS — Z79.890 PREMATURE SURGICAL MENOPAUSE ON HRT: Chronic | ICD-10-CM

## 2022-02-11 DIAGNOSIS — E89.40 PREMATURE SURGICAL MENOPAUSE ON HRT: Chronic | ICD-10-CM

## 2022-02-11 DIAGNOSIS — E55.9 VITAMIN D DEFICIENCY: ICD-10-CM

## 2022-02-11 PROCEDURE — 99213 OFFICE O/P EST LOW 20 MIN: CPT | Performed by: FAMILY MEDICINE

## 2022-02-11 RX ORDER — METRONIDAZOLE 7.5 MG/G
GEL VAGINAL
COMMUNITY
Start: 2022-02-08 | End: 2022-07-03

## 2022-02-11 ASSESSMENT — FIBROSIS 4 INDEX: FIB4 SCORE: 1.33

## 2022-02-14 ENCOUNTER — NON-PROVIDER VISIT (OUTPATIENT)
Dept: INTERNAL MEDICINE | Facility: IMAGING CENTER | Age: 52
End: 2022-02-14
Payer: COMMERCIAL

## 2022-02-14 ENCOUNTER — HOSPITAL ENCOUNTER (OUTPATIENT)
Facility: MEDICAL CENTER | Age: 52
End: 2022-02-14
Attending: SPECIALIST
Payer: COMMERCIAL

## 2022-02-14 ENCOUNTER — HOSPITAL ENCOUNTER (OUTPATIENT)
Facility: MEDICAL CENTER | Age: 52
End: 2022-02-14
Attending: FAMILY MEDICINE
Payer: COMMERCIAL

## 2022-02-14 DIAGNOSIS — E89.40 PREMATURE SURGICAL MENOPAUSE ON HRT: Chronic | ICD-10-CM

## 2022-02-14 DIAGNOSIS — Z79.890 PREMATURE SURGICAL MENOPAUSE ON HRT: Chronic | ICD-10-CM

## 2022-02-14 DIAGNOSIS — Z00.00 HEALTH CARE MAINTENANCE: ICD-10-CM

## 2022-02-14 DIAGNOSIS — E03.9 ACQUIRED HYPOTHYROIDISM: Chronic | ICD-10-CM

## 2022-02-14 DIAGNOSIS — E78.2 MIXED HYPERLIPIDEMIA WITH APOLIPOPROTEIN E4 VARIANT: Chronic | ICD-10-CM

## 2022-02-14 DIAGNOSIS — E55.9 VITAMIN D DEFICIENCY: ICD-10-CM

## 2022-02-14 PROCEDURE — 85025 COMPLETE CBC W/AUTO DIFF WBC: CPT

## 2022-02-14 PROCEDURE — 84443 ASSAY THYROID STIM HORMONE: CPT

## 2022-02-14 PROCEDURE — 83036 HEMOGLOBIN GLYCOSYLATED A1C: CPT

## 2022-02-14 PROCEDURE — 80061 LIPID PANEL: CPT

## 2022-02-14 PROCEDURE — 84439 ASSAY OF FREE THYROXINE: CPT

## 2022-02-14 PROCEDURE — 82670 ASSAY OF TOTAL ESTRADIOL: CPT

## 2022-02-14 PROCEDURE — 82746 ASSAY OF FOLIC ACID SERUM: CPT

## 2022-02-14 PROCEDURE — 84402 ASSAY OF FREE TESTOSTERONE: CPT

## 2022-02-14 PROCEDURE — 84403 ASSAY OF TOTAL TESTOSTERONE: CPT

## 2022-02-14 PROCEDURE — 82607 VITAMIN B-12: CPT

## 2022-02-14 PROCEDURE — 80053 COMPREHEN METABOLIC PANEL: CPT

## 2022-02-14 PROCEDURE — 84270 ASSAY OF SEX HORMONE GLOBUL: CPT

## 2022-02-14 PROCEDURE — 82306 VITAMIN D 25 HYDROXY: CPT

## 2022-02-14 PROCEDURE — 84481 FREE ASSAY (FT-3): CPT

## 2022-02-15 LAB
25(OH)D3 SERPL-MCNC: 40 NG/ML (ref 30–100)
ALBUMIN SERPL BCP-MCNC: 4.1 G/DL (ref 3.2–4.9)
ALBUMIN/GLOB SERPL: 2.2 G/DL
ALP SERPL-CCNC: 113 U/L (ref 30–99)
ALT SERPL-CCNC: 33 U/L (ref 2–50)
ANION GAP SERPL CALC-SCNC: 8 MMOL/L (ref 7–16)
AST SERPL-CCNC: 31 U/L (ref 12–45)
BASOPHILS # BLD AUTO: 0.6 % (ref 0–1.8)
BASOPHILS # BLD: 0.03 K/UL (ref 0–0.12)
BILIRUB SERPL-MCNC: 0.3 MG/DL (ref 0.1–1.5)
BUN SERPL-MCNC: 12 MG/DL (ref 8–22)
CALCIUM SERPL-MCNC: 9 MG/DL (ref 8.5–10.5)
CHLORIDE SERPL-SCNC: 106 MMOL/L (ref 96–112)
CHOLEST SERPL-MCNC: 185 MG/DL (ref 100–199)
CO2 SERPL-SCNC: 28 MMOL/L (ref 20–33)
CREAT SERPL-MCNC: 0.63 MG/DL (ref 0.5–1.4)
EOSINOPHIL # BLD AUTO: 0.22 K/UL (ref 0–0.51)
EOSINOPHIL NFR BLD: 4.4 % (ref 0–6.9)
ERYTHROCYTE [DISTWIDTH] IN BLOOD BY AUTOMATED COUNT: 45.8 FL (ref 35.9–50)
EST. AVERAGE GLUCOSE BLD GHB EST-MCNC: 105 MG/DL
ESTRADIOL SERPL-MCNC: 72.3 PG/ML
FOLATE SERPL-MCNC: 5.7 NG/ML
GLOBULIN SER CALC-MCNC: 1.9 G/DL (ref 1.9–3.5)
GLUCOSE SERPL-MCNC: 94 MG/DL (ref 65–99)
HBA1C MFR BLD: 5.3 % (ref 4–5.6)
HCT VFR BLD AUTO: 48.8 % (ref 37–47)
HDLC SERPL-MCNC: 61 MG/DL
HGB BLD-MCNC: 15.7 G/DL (ref 12–16)
IMM GRANULOCYTES # BLD AUTO: 0.03 K/UL (ref 0–0.11)
IMM GRANULOCYTES NFR BLD AUTO: 0.6 % (ref 0–0.9)
LDLC SERPL CALC-MCNC: 113 MG/DL
LYMPHOCYTES # BLD AUTO: 1.49 K/UL (ref 1–4.8)
LYMPHOCYTES NFR BLD: 29.8 % (ref 22–41)
MCH RBC QN AUTO: 29.8 PG (ref 27–33)
MCHC RBC AUTO-ENTMCNC: 32.2 G/DL (ref 33.6–35)
MCV RBC AUTO: 92.6 FL (ref 81.4–97.8)
MONOCYTES # BLD AUTO: 0.29 K/UL (ref 0–0.85)
MONOCYTES NFR BLD AUTO: 5.8 % (ref 0–13.4)
NEUTROPHILS # BLD AUTO: 2.94 K/UL (ref 2–7.15)
NEUTROPHILS NFR BLD: 58.8 % (ref 44–72)
NRBC # BLD AUTO: 0 K/UL
NRBC BLD-RTO: 0 /100 WBC
PLATELET # BLD AUTO: 262 K/UL (ref 164–446)
PMV BLD AUTO: 11.2 FL (ref 9–12.9)
POTASSIUM SERPL-SCNC: 4 MMOL/L (ref 3.6–5.5)
PROT SERPL-MCNC: 6 G/DL (ref 6–8.2)
RBC # BLD AUTO: 5.27 M/UL (ref 4.2–5.4)
SODIUM SERPL-SCNC: 142 MMOL/L (ref 135–145)
T3FREE SERPL-MCNC: 2.56 PG/ML (ref 2–4.4)
T4 FREE SERPL-MCNC: 1.25 NG/DL (ref 0.93–1.7)
TRIGL SERPL-MCNC: 54 MG/DL (ref 0–149)
TSH SERPL DL<=0.005 MIU/L-ACNC: 0.97 UIU/ML (ref 0.38–5.33)
VIT B12 SERPL-MCNC: 671 PG/ML (ref 211–911)
WBC # BLD AUTO: 5 K/UL (ref 4.8–10.8)

## 2022-02-15 NOTE — PROGRESS NOTES
Chief Complaint   Patient presents with   • Medication Management     Believes thyroid is still off. Brain fog, difficulty concentrating, and difficulty sleeping. Feels very lethargic. Needs her Synthroid to have MERCEDES on it - pharmacy giving her troubles.       HPI:  Patient is a 51 y.o. female established patient who presents today to discuss current health concerns. She feels that her thyroid function may not be optimal on current Synthroid dosing. She reports experiencing brain fog, difficulty concentrating, and disordered sleep that she attributes to chronic acquired hypothyroidism. She continues to take brand name drug, denies concurrent Biotin use, and is compliant with HRT managed by Dr. Harris's team. She has lab orders from Dr. De Luna to be drawn today and is completing her master's degree/ looking into a doctoral program soon. She denies new related mental health changes and is motivated to feel better overall.     Patient Active Problem List    Diagnosis Date Noted   • Obesity (BMI 30-39.9) 01/24/2020   • Primary insomnia 10/07/2018   • Rheumatoid arthritis involving multiple sites with positive rheumatoid factor (HCC) 05/17/2017   • Essential hypertension 11/11/2016   • Family history of stroke 04/13/2016   • Hypothyroidism 08/28/2015   • Heterozygous MTHFR mutation [id number] 11/07/2014   • Heterozygous MTHFR mutation C677T 11/07/2014   • Gluten intolerance 05/16/2014   • Mixed hyperlipidemia with apolipoprotein E4 variant 05/16/2014   • Vitamin D deficiency disease 05/16/2014   • GERD (gastroesophageal reflux disease)    • Polycystic ovaries    • Dysthymic disorder 11/07/2012   • Asthma 11/07/2012   • Premature surgical menopause on HRT 11/07/2012   • Dry eyes 11/07/2012       Past medical, surgical, family, and social history was reviewed and updated in Epic chart by me today.     Medications and allergies reviewed and updated in Epic chart by me today.     ROS:  Pertinent positives listed above in HPI.  "All other systems have been reviewed and are negative.    PE:   /62 (BP Location: Left arm, Patient Position: Sitting, BP Cuff Size: Adult)   Pulse 69   Temp 36.6 °C (97.9 °F) (Temporal)   Resp 17   Ht 1.702 m (5' 7\")   Wt 97.1 kg (214 lb)   LMP 10/06/2010   SpO2 94%   BMI 33.52 kg/m²   Vital signs reviewed with patient.     Gen: Well developed; well nourished; no acute distress; age appropriate appearance   Neuro: No focal deficits noted   Psych: AAOx4; mood and affect are appropriate    A/P:  1. Premature surgical menopause on HRT  Patient is currently taking Evamist managed by Dr. Harris's team. Will check hormone levels for further work up of her current symptoms detailed above.   - TESTOSTERONE F&T FEMALES/CHILD; Future  - ESTRADIOL; Future    2. Acquired hypothyroidism  Patient endorses compliance with Synthroid 125 mcg daily use (cannot tolerate generics) and feels that her thyroid function is off at this time - contributing to brain fog, concentration challenges, and disordered sleep. Recommend checking new TFTs for ongoing management.   - TSH; Future  - T3 FREE; Future  - FREE THYROXINE; Future    3. Mixed hyperlipidemia with apolipoprotein E4 variant  Patient is due for fasting lipid panel for ongoing management.   - Lipid Profile; Future    4. Vitamin D deficiency  Patient is due for Vitamin D level and recommend continuing current Vitamin D supplementation.   - VITAMIN D,25 HYDROXY; Future    5. Health care maintenance  - HEMOGLOBIN A1C; Future  - VITAMIN B12; Future  - FOLATE; Future     She will return in near future for fasting lab draw with Sherry POWERS and subsequent office visit with me to review lab results together/ formulate plan moving forward.     "

## 2022-02-17 ENCOUNTER — TELEMEDICINE (OUTPATIENT)
Dept: INTERNAL MEDICINE | Facility: IMAGING CENTER | Age: 52
End: 2022-02-17
Payer: COMMERCIAL

## 2022-02-17 DIAGNOSIS — R53.83 OTHER FATIGUE: ICD-10-CM

## 2022-02-17 DIAGNOSIS — R41.89 BRAIN FOG: ICD-10-CM

## 2022-02-17 PROCEDURE — 99213 OFFICE O/P EST LOW 20 MIN: CPT | Mod: 95 | Performed by: FAMILY MEDICINE

## 2022-02-20 NOTE — PROGRESS NOTES
Virtual Visit: Established Patient   This visit was conducted via Zoom using secure and encrypted videoconferencing technology.   The patient was outside her office in the state of [place].    The patient's identity was confirmed and verbal consent was obtained for this virtual visit.     Subjective:     Chief Complaint   Patient presents with   • Lab Results       HPI:  Patient is a 51 y.o. female established patient who presents today to review labs done 2/15/2022 in regards to ongoing brain fog and general fatigue. She reports that her sleep study results (also related to TMJ function) are pending, and she expects to have them next week. She denies other new concerns and is in a happy mood today.     Patient Active Problem List    Diagnosis Date Noted   • Obesity (BMI 30-39.9) 01/24/2020   • Primary insomnia 10/07/2018   • Rheumatoid arthritis involving multiple sites with positive rheumatoid factor (HCC) 05/17/2017   • Essential hypertension 11/11/2016   • Family history of stroke 04/13/2016   • Hypothyroidism 08/28/2015   • Heterozygous MTHFR mutation [id number] 11/07/2014   • Heterozygous MTHFR mutation C677T 11/07/2014   • Gluten intolerance 05/16/2014   • Mixed hyperlipidemia with apolipoprotein E4 variant 05/16/2014   • Vitamin D deficiency disease 05/16/2014   • GERD (gastroesophageal reflux disease)    • Polycystic ovaries    • Dysthymic disorder 11/07/2012   • Asthma 11/07/2012   • Premature surgical menopause on HRT 11/07/2012   • Dry eyes 11/07/2012       Past medical, surgical, family, and social history was reviewed and updated in Epic chart by me today.     Medications and allergies reviewed and updated in Epic chart by me today.     Lab results 2/15/22 reviewed with patient at visit today.     ROS:  Pertinent positives listed above in HPI. All other systems have been reviewed and are negative.     Objective:   Vital signs not taken today.    Physical Exam:  Constitutional: Alert, no distress,  well-groomed.  Skin: No rashes in visible areas.  Eye: Round. Conjunctiva clear, lids normal. No icterus.   ENMT: Lips pink without lesions, good dentition, moist mucous membranes. Phonation normal.  Neck: Moves freely without pain.  Respiratory: Unlabored respiratory effort, no cough or audible wheeze  Psych: Alert and oriented x3, normal affect and mood.     Assessment and Plan:   The following treatment plan was discussed:     A/P:  1. Other fatigue/ brain fog  After review of her recent lab tests, I do not see any overt metabolic issue that is contributing to these complaints currently. Recommend patient forward pending sleep study/TMJ function reports to me for further evaluation. She will also follow up with Dr. Harris's team regarding ongoing hormone management, and I will forward testosterone level to patient when available.     Follow-up: PRN

## 2022-02-21 LAB
SHBG SERPL-SCNC: 49 NMOL/L (ref 30–135)
TESTOST FREE SERPL-MCNC: 1.7 PG/ML (ref 1.1–5.8)
TESTOST SERPL-MCNC: 13 NG/DL (ref 9–55)

## 2022-02-25 ENCOUNTER — OFFICE VISIT (OUTPATIENT)
Dept: INTERNAL MEDICINE | Facility: IMAGING CENTER | Age: 52
End: 2022-02-25
Payer: COMMERCIAL

## 2022-02-25 VITALS
WEIGHT: 214.07 LBS | OXYGEN SATURATION: 97 % | HEART RATE: 100 BPM | RESPIRATION RATE: 17 BRPM | BODY MASS INDEX: 33.6 KG/M2 | TEMPERATURE: 97.9 F | HEIGHT: 67 IN | DIASTOLIC BLOOD PRESSURE: 64 MMHG | SYSTOLIC BLOOD PRESSURE: 122 MMHG

## 2022-02-25 DIAGNOSIS — R53.83 OTHER FATIGUE: ICD-10-CM

## 2022-02-25 DIAGNOSIS — G47.33 OSA (OBSTRUCTIVE SLEEP APNEA): ICD-10-CM

## 2022-02-25 DIAGNOSIS — R41.89 BRAIN FOG: ICD-10-CM

## 2022-02-25 PROCEDURE — 99214 OFFICE O/P EST MOD 30 MIN: CPT | Performed by: FAMILY MEDICINE

## 2022-02-25 ASSESSMENT — FIBROSIS 4 INDEX: FIB4 SCORE: 1.03

## 2022-02-28 NOTE — PROGRESS NOTES
Chief Complaint   Patient presents with   • Results     Review results from sleep study. Would like to discuss potential pulmonary second opinion.       HPI:  Patient is a 52 y.o. female established patient who presents today to discuss recent sleep study results. She was seeing Dr. Arnold team for work up of sleep and TMJ concerns and underwent a sleep study 2/1/22 that showed evidence of mild SABINE. Patient was required to pay over $6,000 to proceed with their care plan (she was not aware of the this cost) and is seeking another opinion. She is appropriately concerned about the potential health implications of this diagnosis and continues to suffer from brain fog and general fatigue.     Patient Active Problem List    Diagnosis Date Noted   • Obesity (BMI 30-39.9) 01/24/2020   • Primary insomnia 10/07/2018   • Rheumatoid arthritis involving multiple sites with positive rheumatoid factor (HCC) 05/17/2017   • Essential hypertension 11/11/2016   • Family history of stroke 04/13/2016   • Hypothyroidism 08/28/2015   • Heterozygous MTHFR mutation [id number] 11/07/2014   • Heterozygous MTHFR mutation C677T 11/07/2014   • Gluten intolerance 05/16/2014   • Mixed hyperlipidemia with apolipoprotein E4 variant 05/16/2014   • Vitamin D deficiency disease 05/16/2014   • GERD (gastroesophageal reflux disease)    • Polycystic ovaries    • Dysthymic disorder 11/07/2012   • Asthma 11/07/2012   • Premature surgical menopause on HRT 11/07/2012   • Dry eyes 11/07/2012       Past medical, surgical, family, and social history was reviewed and updated in Epic chart by me today.     Medications and allergies reviewed and updated in Epic chart by me today.     I reviewed sleep study results 2/1/22 with patient at our visit today.     ROS:  Pertinent positives listed above in HPI. All other systems have been reviewed and are negative.    PE:   /64 (BP Location: Left arm, Patient Position: Sitting, BP Cuff Size: Adult)   Pulse 100    "Temp 36.6 °C (97.9 °F) (Temporal)   Resp 17   Ht 1.702 m (5' 7\")   Wt 97.1 kg (214 lb 1.1 oz)   LMP 10/06/2010   SpO2 97%   BMI 33.53 kg/m²   Vital signs reviewed with patient.     Gen: Well developed; well nourished; no acute distress; age appropriate appearance   Skin: Warm and dry; no rashes noted   Neuro: No focal deficits noted   Psych: AAOx4; mood and affect are appropriate    A/P:  1. SABINE (obstructive sleep apnea)  Sleep study done 2/1/2022 shows evidence of mild sleep apnea - refer to HPI for details. We discussed condition at visit, and I will refer patient to [facility] for further evaluation and treatment.   - Referral to Pulmonary and Sleep Medicine    2. Other fatigue  Ongoing issue for patient that I feel is multi factorial in origin.   - Referral to Pulmonary and Sleep Medicine    3. Brain fog  Ongoing issue for patient that I feel is also multi factorial in origin.   - Referral to Pulmonary and Sleep Medicine     Time spent: 30 minutes     "

## 2022-03-24 DIAGNOSIS — I10 ESSENTIAL HYPERTENSION: ICD-10-CM

## 2022-03-24 DIAGNOSIS — F34.1 DYSTHYMIC DISORDER: Chronic | ICD-10-CM

## 2022-03-24 RX ORDER — ESCITALOPRAM OXALATE 20 MG/1
TABLET ORAL
Qty: 90 TABLET | Refills: 3 | Status: SHIPPED | OUTPATIENT
Start: 2022-03-24 | End: 2022-04-25 | Stop reason: SDUPTHER

## 2022-03-24 RX ORDER — LOSARTAN POTASSIUM 50 MG/1
TABLET ORAL
Qty: 90 TABLET | Refills: 3 | Status: SHIPPED | OUTPATIENT
Start: 2022-03-24 | End: 2022-06-08 | Stop reason: SDUPTHER

## 2022-04-25 DIAGNOSIS — F34.1 DYSTHYMIC DISORDER: Chronic | ICD-10-CM

## 2022-04-25 DIAGNOSIS — E03.9 ACQUIRED HYPOTHYROIDISM: ICD-10-CM

## 2022-04-25 RX ORDER — ESCITALOPRAM OXALATE 20 MG/1
20 TABLET ORAL
Qty: 90 TABLET | Refills: 3 | Status: SHIPPED
Start: 2022-04-25 | End: 2022-06-08 | Stop reason: SDUPTHER

## 2022-04-25 RX ORDER — LEVOTHYROXINE SODIUM 125 MCG
125 TABLET ORAL
Qty: 90 TABLET | Refills: 2 | Status: SHIPPED
Start: 2022-04-25 | End: 2022-06-08 | Stop reason: SDUPTHER

## 2022-05-16 ENCOUNTER — OFFICE VISIT (OUTPATIENT)
Dept: SLEEP MEDICINE | Facility: MEDICAL CENTER | Age: 52
End: 2022-05-16
Payer: COMMERCIAL

## 2022-05-16 VITALS
BODY MASS INDEX: 35.31 KG/M2 | RESPIRATION RATE: 16 BRPM | HEART RATE: 76 BPM | HEIGHT: 67 IN | SYSTOLIC BLOOD PRESSURE: 128 MMHG | DIASTOLIC BLOOD PRESSURE: 84 MMHG | WEIGHT: 225 LBS | OXYGEN SATURATION: 95 %

## 2022-05-16 DIAGNOSIS — G47.33 OSA (OBSTRUCTIVE SLEEP APNEA): ICD-10-CM

## 2022-05-16 PROCEDURE — 99204 OFFICE O/P NEW MOD 45 MIN: CPT | Performed by: FAMILY MEDICINE

## 2022-05-16 RX ORDER — LEFLUNOMIDE 10 MG/1
TABLET ORAL
COMMUNITY
Start: 2022-02-21 | End: 2022-07-03

## 2022-05-16 ASSESSMENT — PATIENT HEALTH QUESTIONNAIRE - PHQ9: CLINICAL INTERPRETATION OF PHQ2 SCORE: 0

## 2022-05-16 ASSESSMENT — FIBROSIS 4 INDEX: FIB4 SCORE: 1.05

## 2022-05-16 NOTE — PROGRESS NOTES
"      [facility]  Consult Note     Date: 5/16/2022 / Time: 9:33 AM    Patient ID:   Name:             Soraida Fregoso   YOB: 1970  Age:                 52 y.o.  female   MRN:               [id number]      Thank you for requesting a sleep medicine consultation on Soraida Fregoso at the sleep center. She presents today with the chief complaints of snoring  and excessive daytime sleepiness. She is referred by Dr. alvarez for evaluation and treatment of sleep disorder breathing. She had a HST on 2/4/22 with her dentist which showed AHI of 9.8/hr with O2 behzad 82%.    HISTORY OF PRESENT ILLNESS:       At night,  Soraida Fregoso goes to bed around 11:30 pm-12:30 am on weekdays and around 12 am on the weekends. She gets out of bed at 8 am on weekdays and at 9 am on the weekends.  She  averages about 8 hrs of sleep on a good night and 6 hrs on a bad night. She falls asleep within 20-30 minutes. She wakes up about 2-3 times during the night due to no obvious reason and bathroom use and on average It takesher 30 min to fall back asleep.      She is aware of snoring but denies breathing pauses/gasping or choking in sleep.  She  denies any symptoms of restless legs syndrome such as an \"urge to move\"  She  legs in the evening or bedtime. She  denies any symptoms of narcolepsy such as sleep paralysis or cataplexy, or any symptoms to suggest parasomnias such as sleep walking or acting out of dreams. She rarely medications for the sleep problem.Overall, she doesnot finds her sleep refreshing. [place] sleepiness scale score is 8/24.She does not take regular naps.She drinks about 0 caffeinated beverages per day.      REVIEW OF SYSTEMS:       Constitutional: Denies fevers, Denies weight changes  Eyes: Denies changes in vision, no eye pain  Ears/Nose/Throat/Mouth: Denies nasal congestion or sore throat   Cardiovascular: Denies chest pain or palpitations   Respiratory: Denies shortness of breath , Denies " cough  Gastrointestinal/Hepatic: Denies abdominal pain, nausea, vomiting, diarrhea, constipation or GI bleeding   Genitourinary: Denies bladder dysfunction, dysuria or frequency  Musculoskeletal/Rheum: Denies  joint pain and swelling   Skin/Breast: Denies rash  Neurological: Denies headache, confusion, memory loss or focal weakness/parasthesias  Psychiatric: denies mood disorder     Comprehensive review of systems form is reviewed with the patient and is attached in the EMR.     PMH:  has a past medical history of Allergic rhinitis, Anemia, Anesthesia, ASTHMA, Chickenpox, Depression, Dizziness, Fainting, Fatigue, GERD (gastroesophageal reflux disease), Grave's disease (08/01/2010), Heart burn, Heartburn, Hiatus hernia syndrome, Hoarseness, persistent, Hypertension, Hypothyroid, Hypothyroidism, Influenza, Insomnia, Morning headache, Obesity, Overweight(278.02), Painful joint, Polycystic ovaries, rheumatoid (01/01/2000), Rheumatoid arthritis (HCC), Ringing in ears, Sore muscles, Wears glasses, Wheezing, and Whooping cough.  MEDS:   Current Outpatient Medications:   •  leflunomide (ARAVA) 10 MG Tab, TAKE 1 TABLET BY MOUTH EVERY OTHER DAY FOR 90 DAYS, Disp: , Rfl:   •  escitalopram (LEXAPRO) 20 MG tablet, Take 1 Tablet by mouth every day., Disp: 90 Tablet, Rfl: 3  •  SYNTHROID 125 MCG Tab, Take 1 Tablet by mouth every morning on an empty stomach., Disp: 90 Tablet, Rfl: 2  •  losartan (COZAAR) 50 MG Tab, TAKE 1 TABLET BY MOUTH EVERY DAY, Disp: 90 Tablet, Rfl: 3  •  QVAR REDIHALER 80 MCG/ACT inhaler, , Disp: , Rfl:   •  levalbuterol (XOPENEX) 0.63 MG/3ML Nebu Soln, 3 mL by Nebulization route every 8 hours as needed. Indications: Spasm of Lung Air Passages, Disp: 24 mL, Rfl: 3  •  albuterol 108 (90 BASE) MCG/ACT Aero Soln inhalation aerosol, Inhale 2 Puffs by mouth every 6 hours as needed for Shortness of Breath., Disp: 8.5 g, Rfl: 12  •  Estradiol (EVAMIST) 1.53 MG/SPRAY SOLN, Apply 1-3 Sprays to skin as directed every  "day. (Patient taking differently: Place 2 Sprays on the skin every day.), Disp: 1 Bottle, Rfl: 0  •  leflunomide (ARAVA) 20 MG TABS, Take 1 Tab by mouth every day., Disp: 90 Tab, Rfl: 3  •  Cholecalciferol (VITAMIN D) 2000 UNITS CAPS, Take 1 Cap by mouth every day., Disp: 30 Cap, Rfl:   •  multivitamin (THERAGRAN) per tablet, Take 1 Tab by mouth every day., Disp: , Rfl:   •  cyclosporin (RESTASIS) 0.05 % ophthalmic emulsion, Place 1 Drop in both eyes 2 times a day., Disp: 1 Each, Rfl: 3  •  metroNIDAZOLE (METROGEL-VAGINAL) 0.75 % Gel, , Disp: , Rfl:   •  mometasone (NASONEX) 50 MCG/ACT nasal spray, Administer 2 Sprays into affected nostril(S) every day., Disp: 17 g, Rfl: 6  •  scopolamine (TRANSDERM-SCOP, 1.5 MG,) 1 mg/72hr PATCH 72 HR, Place 1 Patch on the skin every 72 hours., Disp: 4 Patch, Rfl: 3  •  terbinafine (LAMISIL) 250 MG Tab, TAKE 1 TABLET BY MOUTH EVERY DAY AFTER MEALS, Disp: , Rfl: 0  •  ORENCIA CLICKJECT 125 MG/ML Solution Auto-injector, , Disp: , Rfl:   •  COD LIVER OIL PO, Take  by mouth., Disp: , Rfl:   •  Evening Primrose Oil 500 MG Cap, Take 1 Cap by mouth 2 Times a Day., Disp: , Rfl:   ALLERGIES:   Allergies   Allergen Reactions   • Sulfa Drugs Swelling   • Amoxicillin Rash     Skin rash     SURGHX:   Past Surgical History:   Procedure Laterality Date   • HYSTERECTOMY ROBOTIC  10/06/2010    Performed by ELSY HINES at SURGERY [facility] ORS   • CYSTOSCOPY  10/06/2010    Performed by ELSY HINES at SURGERY [facility] ORS   • KIMI BY LAPAROSCOPY  01/01/2007   • LAP BAND SYSTEM  01/01/2006   • CHOLECYSTECTOMY     • HYSTERECTOMY LAPAROSCOPY     • SLEEVE,ARRON VASO THIGH       SOCHX:  reports that she has never smoked. She has never used smokeless tobacco. She reports that she does not drink alcohol and does not use drugs.   FH:   Family History   Problem Relation Age of Onset   • Stroke Father 41   • Heart Disease Father         \"hole\" in heart causing stroke   • Arthritis Sister  " "       rheumatoid-severe   • Asthma Sister    • Alcohol/Drug Brother    • Cancer Maternal Grandmother    • Sleep Apnea Neg Hx        Physical Exam:  Vitals/ General Appearance:   Weight/BMI: Body mass index is 35.24 kg/m².  /84 (BP Location: Left arm, Patient Position: Sitting, BP Cuff Size: Adult)   Pulse 76   Resp 16   Ht 1.702 m (5' 7\")   Wt 102 kg (225 lb)   SpO2 95%   Vitals:    05/16/22 0922   BP: 128/84   BP Location: Left arm   Patient Position: Sitting   BP Cuff Size: Adult   Pulse: 76   Resp: 16   SpO2: 95%   Weight: 102 kg (225 lb)   Height: 1.702 m (5' 7\")           Constitutional: Alert, no distress, well-groomed.  Skin: No rashes in visible areas.  Eye: Round. Conjunctiva clear, lids normal. No icterus.   ENMT: Lips pink without lesions, good dentition, moist mucous membranes. Phonation normal.  Neck: No masses, no thyromegaly. Moves freely without pain.  CV: Pulse as reported by patient  Respiratory: Unlabored respiratory effort, no cough or audible wheeze  Psych: Alert and oriented x3, normal affect and mood.   INVESTIGATIONS:       ASSESSMENT AND PLAN     1. She  has symptoms of Obstructive Sleep Apnea (SABINE).     We have discussed diagnostic options including in-laboratory, attended polysomnography and home sleep testing. We have also discussed treatment options including airway pressurization, reconstructive otolaryngologic surgery, dental appliances and weight management.       Subsequently,treatment options will be discussed based on the diagnostic study. Meanwhile, She is urged to avoid supine sleep, weight gain and alcoholic beverages since all of these can worsen SABINE. She is cautioned against drowsy driving. If She feels sleepy while driving, She must pull over for a break/nap, rather than persist on the road, in the interest of She own safety and that of others on the road.    Plan  -  She  will be scheduled for an overnight HST to assess sleep related  breathing " disorder.    2.Regarding treatment of other past medical problems and general health maintenance,  She is urged to follow up with PCP.

## 2022-05-16 NOTE — PATIENT INSTRUCTIONS
Stimulus control (Ref: UpToDate)    Patients with insomnia may associate their bed and bedroom with the fear of not sleeping or other arousing events, rather than the more pleasurable anticipation of sleep. The longer one stays in bed trying to sleep, the stronger the association becomes. This perpetuates the difficulty falling asleep.Stimulus control therapy is a strategy whose purpose is to disrupt this association by enhancing the likelihood of sleep . Patients should not go to bed until they are sleepy and should use the bed primarily for sleep (and not for reading, watching television, eating, or worrying). They should not spend more than 20 minutes in bed awake. If they are awake after 20 minutes, they should leave the bedroom and engage in a relaxing activity, such as reading or listening to soothing music. Patients should not engage in activities that stimulate them or reward them for being awake in the middle of the night, such as eating or watching television. In addition, they should not return to bed until they are tired and feel ready to sleep. If they return to bed and still cannot sleep within 20 minutes, the process should be repeated. An alarm should be set to wake the patient at the same time every morning, including weekends. Daytime naps are not allowed.  \Sleep hygiene (ref: UpToDate)  ?Sleep as long as necessary to feel rested (usually seven to eight hours for adults) and then get out of bed  ?Maintain a regular sleep schedule, particularly a regular wake-up time in the morning  ?Try not to force sleep  ?Avoid caffeinated beverages after lunch  ?Avoid alcohol near bedtime (eg, late afternoon and evening)  ?Avoid smoking or other nicotine intake, particularly during the evening  ?Adjust the bedroom environment as needed to decrease stimuli (eg, reduce ambient light, turn off the television or radio)  ?Avoid use of light-emitting screens  (laptops, tablets, smartphones, [employer]ooks) 2 hours before  bedtime   ?Resolve concerns or worries before bedtime  ?Exercise regularly for at least 20 minutes, preferably more than four to five hours prior to bedtime.  ?Avoid daytime naps, especially if they are longer than 20 to 30 minutes or occur late in the day

## 2022-06-08 DIAGNOSIS — J30.89 OTHER ALLERGIC RHINITIS: ICD-10-CM

## 2022-06-08 DIAGNOSIS — E03.9 ACQUIRED HYPOTHYROIDISM: ICD-10-CM

## 2022-06-08 DIAGNOSIS — F34.1 DYSTHYMIC DISORDER: Chronic | ICD-10-CM

## 2022-06-08 DIAGNOSIS — I10 ESSENTIAL HYPERTENSION: ICD-10-CM

## 2022-06-08 RX ORDER — LOSARTAN POTASSIUM 50 MG/1
50 TABLET ORAL
Qty: 90 TABLET | Refills: 3 | Status: SHIPPED | OUTPATIENT
Start: 2022-06-08 | End: 2023-01-06 | Stop reason: SDUPTHER

## 2022-06-08 RX ORDER — MOMETASONE FUROATE 50 UG/1
2 SPRAY, METERED NASAL DAILY
Qty: 17 G | Refills: 6 | Status: SHIPPED | OUTPATIENT
Start: 2022-06-08

## 2022-06-08 RX ORDER — LEVOTHYROXINE SODIUM 125 MCG
125 TABLET ORAL
Qty: 90 TABLET | Refills: 2 | Status: SHIPPED | OUTPATIENT
Start: 2022-06-08

## 2022-06-08 RX ORDER — ESCITALOPRAM OXALATE 20 MG/1
20 TABLET ORAL
Qty: 90 TABLET | Refills: 3 | Status: SHIPPED | OUTPATIENT
Start: 2022-06-08

## 2022-06-28 ENCOUNTER — OFFICE VISIT (OUTPATIENT)
Dept: INTERNAL MEDICINE | Facility: IMAGING CENTER | Age: 52
End: 2022-06-28
Payer: COMMERCIAL

## 2022-06-28 VITALS
DIASTOLIC BLOOD PRESSURE: 64 MMHG | SYSTOLIC BLOOD PRESSURE: 126 MMHG | HEART RATE: 89 BPM | BODY MASS INDEX: 35.29 KG/M2 | HEIGHT: 67 IN | WEIGHT: 224.87 LBS | RESPIRATION RATE: 17 BRPM | TEMPERATURE: 98.3 F | OXYGEN SATURATION: 91 %

## 2022-06-28 DIAGNOSIS — J45.20 MILD INTERMITTENT ASTHMA WITHOUT COMPLICATION: Chronic | ICD-10-CM

## 2022-06-28 PROCEDURE — 99214 OFFICE O/P EST MOD 30 MIN: CPT | Performed by: FAMILY MEDICINE

## 2022-06-28 RX ORDER — LEVALBUTEROL INHALATION SOLUTION 0.63 MG/3ML
0.63 SOLUTION RESPIRATORY (INHALATION) EVERY 4 HOURS PRN
Qty: 75 ML | Refills: 6 | Status: SHIPPED | OUTPATIENT
Start: 2022-06-28

## 2022-06-28 RX ORDER — BECLOMETHASONE DIPROPIONATE HFA 80 UG/1
2 AEROSOL, METERED RESPIRATORY (INHALATION) 2 TIMES DAILY
Qty: 10.6 G | Refills: 12 | Status: SHIPPED | OUTPATIENT
Start: 2022-06-28

## 2022-06-28 RX ORDER — ALBUTEROL SULFATE 90 UG/1
2 AEROSOL, METERED RESPIRATORY (INHALATION) EVERY 6 HOURS PRN
Qty: 8.5 G | Refills: 12 | Status: SHIPPED | OUTPATIENT
Start: 2022-06-28

## 2022-06-28 RX ORDER — FLUTICASONE PROPIONATE AND SALMETEROL 100; 50 UG/1; UG/1
1 POWDER RESPIRATORY (INHALATION) EVERY 12 HOURS
Qty: 60 EACH | Refills: 12 | Status: SHIPPED | OUTPATIENT
Start: 2022-06-28

## 2022-06-28 ASSESSMENT — FIBROSIS 4 INDEX: FIB4 SCORE: 1.05

## 2022-07-03 NOTE — PROGRESS NOTES
Chief Complaint   Patient presents with   • Medication Management     Requesting that I take over management of respiratory medications       HPI:  Patient is a 52 y.o. female established patient who presents today to request that I take over management of all medications for chronic intermittent asthma. She has been seeing Dr. Li for long term management of this condition and has been stable on her current medications for greater than 10 years. Unfortunately, she has been encountering difficulties obtaining medication refills from his office and is seeking assistance. She denies recent exacerbation, remains well informed about appropriate medication use, and is changing her pharmacy to [facility]. She recently went to [facility] for a wellness consultation and had labs drawn. She denies other new concerns and is in good spirits today.     Patient Active Problem List    Diagnosis Date Noted   • Obesity (BMI 30-39.9) 01/24/2020   • Primary insomnia 10/07/2018   • Rheumatoid arthritis involving multiple sites with positive rheumatoid factor (HCC) 05/17/2017   • Essential hypertension 11/11/2016   • Family history of stroke 04/13/2016   • Hypothyroidism 08/28/2015   • Heterozygous MTHFR mutation [id number] 11/07/2014   • Heterozygous MTHFR mutation C677T 11/07/2014   • Gluten intolerance 05/16/2014   • Mixed hyperlipidemia with apolipoprotein E4 variant 05/16/2014   • Vitamin D deficiency disease 05/16/2014   • GERD (gastroesophageal reflux disease)    • Polycystic ovaries    • Dysthymic disorder 11/07/2012   • Asthma 11/07/2012   • Premature surgical menopause on HRT 11/07/2012   • Dry eyes 11/07/2012       Past medical, surgical, family, and social history was reviewed and updated in Epic chart by me today.     Medications and allergies reviewed and updated in Epic chart by me today.     ROS:  Pertinent positives listed above in HPI. All other systems have been reviewed and are negative.    PE:   /64 (BP  "Location: Left arm, Patient Position: Sitting, BP Cuff Size: Adult)   Pulse 89   Temp 36.8 °C (98.3 °F) (Temporal)   Resp 17   Ht 1.702 m (5' 7\")   Wt 102 kg (224 lb 13.9 oz)   LMP 10/06/2010   SpO2 91%   BMI 35.22 kg/m²   Vital signs reviewed with patient.     Gen: Well developed; well nourished; no acute distress; age appropriate appearance   Skin: Warm and dry; no rashes noted   Neuro: No focal deficits noted   Psych: AAOx4; mood and affect are appropriate    A/P:  1. Mild intermittent asthma without complication  Stable without recent exacerbation. I will manage her medications moving forward due to challenges obtaining refills from her allergy/immunology team. Recommend patient continue current scheduled and PRN medication use, and she remains well versed in appropriate medication usage. New RX sent to pharmacy.   - albuterol 108 (90 Base) MCG/ACT Aero Soln inhalation aerosol; Inhale 2 Puffs every 6 hours as needed for Shortness of Breath.  Dispense: 8.5 g; Refill: 12  - beclomethasone HFA (QVAR REDIHALER) 80 MCG/ACT inhaler; Inhale 2 Puffs 2 times a day.  Dispense: 10.6 g; Refill: 12  - fluticasone-salmeterol (ADVAIR) 100-50 MCG/ACT AEROSOL POWDER, BREATH ACTIVATED; Inhale 1 Puff every 12 hours.  Dispense: 60 Each; Refill: 12  - levalbuterol (XOPENEX) 0.63 MG/3ML Nebu Soln; Take 3 mL by nebulization every four hours as needed for Shortness of Breath.  Dispense: 75 mL; Refill: 6    Sherry POWERS will request recent labs done at [facility] for review.         Time spent: 30 minutes  "

## 2022-07-11 ENCOUNTER — HOME STUDY (OUTPATIENT)
Dept: SLEEP MEDICINE | Facility: MEDICAL CENTER | Age: 52
End: 2022-07-11
Attending: FAMILY MEDICINE
Payer: COMMERCIAL

## 2022-07-11 DIAGNOSIS — G47.33 OSA (OBSTRUCTIVE SLEEP APNEA): ICD-10-CM

## 2022-07-11 PROCEDURE — 95806 SLEEP STUDY UNATT&RESP EFFT: CPT | Performed by: FAMILY MEDICINE

## 2022-07-15 ENCOUNTER — HOSPITAL ENCOUNTER (OUTPATIENT)
Dept: RADIOLOGY | Facility: MEDICAL CENTER | Age: 52
End: 2022-07-15
Attending: ALLERGY & IMMUNOLOGY
Payer: COMMERCIAL

## 2022-07-15 ENCOUNTER — OFFICE VISIT (OUTPATIENT)
Dept: INTERNAL MEDICINE | Facility: IMAGING CENTER | Age: 52
End: 2022-07-15
Payer: COMMERCIAL

## 2022-07-15 ENCOUNTER — TELEPHONE (OUTPATIENT)
Dept: INTERNAL MEDICINE | Facility: IMAGING CENTER | Age: 52
End: 2022-07-15

## 2022-07-15 ENCOUNTER — HOSPITAL ENCOUNTER (OUTPATIENT)
Facility: MEDICAL CENTER | Age: 52
End: 2022-07-15
Attending: FAMILY MEDICINE
Payer: COMMERCIAL

## 2022-07-15 VITALS
WEIGHT: 234.4 LBS | SYSTOLIC BLOOD PRESSURE: 126 MMHG | DIASTOLIC BLOOD PRESSURE: 62 MMHG | BODY MASS INDEX: 36.79 KG/M2 | HEART RATE: 76 BPM | RESPIRATION RATE: 17 BRPM | OXYGEN SATURATION: 96 % | TEMPERATURE: 98.5 F | HEIGHT: 67 IN

## 2022-07-15 DIAGNOSIS — R06.02 SHORTNESS OF BREATH: ICD-10-CM

## 2022-07-15 DIAGNOSIS — R05.9 COUGH: ICD-10-CM

## 2022-07-15 DIAGNOSIS — J45.41 MODERATE PERSISTENT REACTIVE AIRWAY DISEASE WITH ACUTE EXACERBATION: ICD-10-CM

## 2022-07-15 DIAGNOSIS — R60.9 PERIPHERAL EDEMA: ICD-10-CM

## 2022-07-15 LAB
ALBUMIN SERPL BCP-MCNC: 4.1 G/DL (ref 3.2–4.9)
ALBUMIN/GLOB SERPL: 1.6 G/DL
ALP SERPL-CCNC: 103 U/L (ref 30–99)
ALT SERPL-CCNC: 29 U/L (ref 2–50)
ANION GAP SERPL CALC-SCNC: 9 MMOL/L (ref 7–16)
AST SERPL-CCNC: 29 U/L (ref 12–45)
BASOPHILS # BLD AUTO: 0.9 % (ref 0–1.8)
BASOPHILS # BLD: 0.05 K/UL (ref 0–0.12)
BILIRUB SERPL-MCNC: 0.4 MG/DL (ref 0.1–1.5)
BUN SERPL-MCNC: 10 MG/DL (ref 8–22)
CALCIUM SERPL-MCNC: 9.1 MG/DL (ref 8.5–10.5)
CHLORIDE SERPL-SCNC: 106 MMOL/L (ref 96–112)
CO2 SERPL-SCNC: 25 MMOL/L (ref 20–33)
CREAT SERPL-MCNC: 0.86 MG/DL (ref 0.5–1.4)
CRP SERPL HS-MCNC: <0.3 MG/DL (ref 0–0.75)
EOSINOPHIL # BLD AUTO: 0.76 K/UL (ref 0–0.51)
EOSINOPHIL NFR BLD: 14.3 % (ref 0–6.9)
ERYTHROCYTE [DISTWIDTH] IN BLOOD BY AUTOMATED COUNT: 45.6 FL (ref 35.9–50)
ERYTHROCYTE [SEDIMENTATION RATE] IN BLOOD BY WESTERGREN METHOD: <1 MM/HOUR (ref 0–25)
GFR SERPLBLD CREATININE-BSD FMLA CKD-EPI: 82 ML/MIN/1.73 M 2
GLOBULIN SER CALC-MCNC: 2.6 G/DL (ref 1.9–3.5)
GLUCOSE SERPL-MCNC: 80 MG/DL (ref 65–99)
HCT VFR BLD AUTO: 44.1 % (ref 37–47)
HGB BLD-MCNC: 14.4 G/DL (ref 12–16)
IMM GRANULOCYTES # BLD AUTO: 0.01 K/UL (ref 0–0.11)
IMM GRANULOCYTES NFR BLD AUTO: 0.2 % (ref 0–0.9)
LYMPHOCYTES # BLD AUTO: 1.48 K/UL (ref 1–4.8)
LYMPHOCYTES NFR BLD: 27.9 % (ref 22–41)
MCH RBC QN AUTO: 29.8 PG (ref 27–33)
MCHC RBC AUTO-ENTMCNC: 32.7 G/DL (ref 33.6–35)
MCV RBC AUTO: 91.3 FL (ref 81.4–97.8)
MONOCYTES # BLD AUTO: 0.46 K/UL (ref 0–0.85)
MONOCYTES NFR BLD AUTO: 8.7 % (ref 0–13.4)
NEUTROPHILS # BLD AUTO: 2.54 K/UL (ref 2–7.15)
NEUTROPHILS NFR BLD: 48 % (ref 44–72)
NRBC # BLD AUTO: 0 K/UL
NRBC BLD-RTO: 0 /100 WBC
NT-PROBNP SERPL IA-MCNC: 94 PG/ML (ref 0–125)
PLATELET # BLD AUTO: 232 K/UL (ref 164–446)
PMV BLD AUTO: 11.1 FL (ref 9–12.9)
POTASSIUM SERPL-SCNC: 3.8 MMOL/L (ref 3.6–5.5)
PROT SERPL-MCNC: 6.7 G/DL (ref 6–8.2)
RBC # BLD AUTO: 4.83 M/UL (ref 4.2–5.4)
SODIUM SERPL-SCNC: 140 MMOL/L (ref 135–145)
T4 FREE SERPL-MCNC: 0.92 NG/DL (ref 0.93–1.7)
TSH SERPL DL<=0.005 MIU/L-ACNC: 3.37 UIU/ML (ref 0.38–5.33)
WBC # BLD AUTO: 5.3 K/UL (ref 4.8–10.8)

## 2022-07-15 PROCEDURE — 80053 COMPREHEN METABOLIC PANEL: CPT

## 2022-07-15 PROCEDURE — 84439 ASSAY OF FREE THYROXINE: CPT

## 2022-07-15 PROCEDURE — 85025 COMPLETE CBC W/AUTO DIFF WBC: CPT

## 2022-07-15 PROCEDURE — 86140 C-REACTIVE PROTEIN: CPT

## 2022-07-15 PROCEDURE — 93000 ELECTROCARDIOGRAM COMPLETE: CPT | Performed by: FAMILY MEDICINE

## 2022-07-15 PROCEDURE — 84443 ASSAY THYROID STIM HORMONE: CPT

## 2022-07-15 PROCEDURE — 71046 X-RAY EXAM CHEST 2 VIEWS: CPT

## 2022-07-15 PROCEDURE — 85652 RBC SED RATE AUTOMATED: CPT

## 2022-07-15 PROCEDURE — 99214 OFFICE O/P EST MOD 30 MIN: CPT | Performed by: FAMILY MEDICINE

## 2022-07-15 PROCEDURE — 83880 ASSAY OF NATRIURETIC PEPTIDE: CPT

## 2022-07-15 RX ORDER — POTASSIUM CHLORIDE 20 MEQ/1
20 TABLET, EXTENDED RELEASE ORAL EVERY MORNING
Qty: 3 TABLET | Refills: 0 | Status: SHIPPED | OUTPATIENT
Start: 2022-07-15 | End: 2022-07-18

## 2022-07-15 RX ORDER — METHYLPREDNISOLONE 4 MG/1
TABLET ORAL
Qty: 21 TABLET | Refills: 0 | Status: SHIPPED | OUTPATIENT
Start: 2022-07-15 | End: 2022-09-30 | Stop reason: SDUPTHER

## 2022-07-15 RX ORDER — FUROSEMIDE 20 MG/1
20 TABLET ORAL EVERY MORNING
Qty: 3 TABLET | Refills: 0 | Status: SHIPPED | OUTPATIENT
Start: 2022-07-15 | End: 2022-07-18

## 2022-07-15 ASSESSMENT — FIBROSIS 4 INDEX: FIB4 SCORE: 1.05

## 2022-07-16 NOTE — TELEPHONE ENCOUNTER
Reviewed labs and imaging results with patient tonight - I feel that significant increase in eosinophils is consistent with probable allergic trigger for reactive airway condition. Recommend patient continue current daily scheduled and PRN inhaled medications and start medrol dose pack. In regards to bilateral lower extremity edema, there are no overt signs of heart failure on workup today. I feel it is due to recent travel and significant heat exposure during travels - recommend patient do three day trial of lasix and KCL and update me accordingly on Monday. All questions answered as able.

## 2022-07-17 NOTE — PROGRESS NOTES
"Chief Complaint   Patient presents with   • Edema     X2 weeks - not sleeping, coughing, \"gasping for air\", SOB. She went and saw MD for asthma - O2 was 97% and PFT were normal. Bilateral leg swelling that also started about 2 weeks ago that has progressively gotten worse.       HPI:  Patient is a 52 y.o. female established patient who presents today, with her  present, for evaluation of new health changes that started approximately two weeks. She reports new dry coughing spasms, associated sensation of shortness of breath, and gasping although she has been able to speak full sentences to date. She also reports new bilateral lower extremity edema/unintentional weight gain without associated pain, skin streaking, nor recent illness. She and her  have traveled within the past two weeks but both deny COVID concerns (home test -). She also denies associated chest pain, shortness of breath, fever or other related symptoms.     She saw Dr. Li today for evaluation and treatment of symptoms, and PFTs/ room air O2 saturation WNL. He recommended that patient continue current scheduled and PRN inhalers, obtain CXR, and follow up with me for further evaluation of current complaints.    Today, patient is ambulating without assistance, speaking complete sentences without evidence of air hunger, and has dry, hacking cough present.     Patient Active Problem List    Diagnosis Date Noted   • Obesity (BMI 30-39.9) 01/24/2020   • Primary insomnia 10/07/2018   • Rheumatoid arthritis involving multiple sites with positive rheumatoid factor (HCC) 05/17/2017   • Essential hypertension 11/11/2016   • Family history of stroke 04/13/2016   • Hypothyroidism 08/28/2015   • Heterozygous MTHFR mutation [id number] 11/07/2014   • Heterozygous MTHFR mutation C677T 11/07/2014   • Gluten intolerance 05/16/2014   • Mixed hyperlipidemia with apolipoprotein E4 variant 05/16/2014   • Vitamin D deficiency disease 05/16/2014   • GERD " "(gastroesophageal reflux disease)    • Polycystic ovaries    • Dysthymic disorder 11/07/2012   • Asthma 11/07/2012   • Premature surgical menopause on HRT 11/07/2012   • Dry eyes 11/07/2012       Past medical, surgical, family, and social history was reviewed and updated in Epic chart by me today.     Medications and allergies reviewed and updated in Epic chart by me today.     EKG done at visit today and compared to EKG on file 1/20/14 and interpreted by myself: NSR HR:60 and stable with comparison of previous EKG    ROS:  Pertinent positives listed above in HPI. All other systems have been reviewed and are negative.    PE:   /62 (BP Location: Left arm, Patient Position: Sitting, BP Cuff Size: Adult)   Pulse 76   Temp 36.9 °C (98.5 °F) (Temporal)   Resp 17   Ht 1.702 m (5' 7\")   Wt 106 kg (234 lb 6.4 oz)   LMP 10/06/2010   SpO2 96%   BMI 36.71 kg/m²   Vital signs reviewed with patient.     Gen: Well developed; well nourished; no acute distress; non toxic appearance   HEENT: Normocephalic; atraumatic; PEERLA b/l; sclera clear b/l; b/l external auditory canals WNL; b/l TM WNL; nares patent; oropharynx clear; posterior oropharynx is red and irritated; oral mucosa moist; tongue midline; dentition adequate after mask removed  Neck: No adenopathy; no thyromegaly; no carotid bruit b/l  CV: Regular rate and rhythm; S1/ S2 present; no murmur, gallop or rub noted  Pulm: No respiratory distress; clear to ascultation b/l; no wheezing or stridor noted b/l; dry, hacking cough  Abd: Adequate bowel sounds noted; soft and nontender; no rebound, rigidity, nor distention  Extremities: 2+ peripheral edema b/l LE extremities without pain nor skin streaking/ no clubbing nor cyanosis noted  Skin: Warm and dry; no rashes noted   Neuro: No new focal deficits noted; speaking full sentences and ambulating without assistance  Psych: AAOx4; mood and affect are anxious due to current health condition     A/P:  1. Shortness of " breath  New condition present for the past two weeks without known etiology. Refer to HPI for details. EKG WNL today. Differential diagnosis discussed and recommend patient obtain CXR (has paper order from Dr. Li) now and will draw labs at visit today for further work up. I will contact patient later today when imaging and lab results are available for further care management.   - EKG - Clinic Performed  - proBrain Natriuretic Peptide, NT; Future  - Sed Rate; Future  - CRP QUANTITIVE (NON-CARDIAC); Future  - CBC WITH DIFFERENTIAL; Future  - Comp Metabolic Panel; Future  - TSH; Future  - FREE THYROXINE; Future    I called patient tonight and discussed lab and CXR results (WNL). There is no overt evidence of CHF or acute infection but eosinophil count is markedly elevated. I feel that her cough complaints are related to reactive airway condition, and peripheral edema is related to recent travel.      2. Peripheral edema  She has global bilateral lower extremity edema that I feel is related to travel and significant heat exposure. I do not feel that bilateral US is warranted at this time and recommend patient elevate legs, ensure adequate hydration daily, and do three day trial of Lasix and KCL. New RX sent to pharmacy, and I encouraged patient to contact me within the next 72 hours for further management.   - furosemide (LASIX) 20 MG Tab; Take 1 Tablet by mouth every morning for 3 days.  Dispense: 3 Tablet; Refill: 0  - potassium chloride SA (KDUR) 20 MEQ Tab CR; Take 1 Tablet by mouth every morning for 3 days.  Dispense: 3 Tablet; Refill: 0    3. Moderate persistent reactive airway disease with acute exacerbation  CBC shows significantly elevated eosinophil count and recommend patient start Medrol dose pack, continue current scheduled and PRN inhaled medications, rest, use Delsym PRN, and follow clinical response. New RX sent to pharmacy.   - methylPREDNISolone (MEDROL DOSEPAK) 4 MG Tablet Therapy Pack; As  directed on the packaging label.  Dispense: 21 Tablet; Refill: 0

## 2022-07-19 ENCOUNTER — PATIENT MESSAGE (OUTPATIENT)
Dept: SLEEP MEDICINE | Facility: MEDICAL CENTER | Age: 52
End: 2022-07-19
Payer: COMMERCIAL

## 2022-07-19 DIAGNOSIS — R60.9 PERIPHERAL EDEMA: ICD-10-CM

## 2022-07-19 RX ORDER — FUROSEMIDE 20 MG/1
20 TABLET ORAL EVERY MORNING
Qty: 5 TABLET | Refills: 0 | Status: SHIPPED | OUTPATIENT
Start: 2022-07-19 | End: 2022-07-24

## 2022-07-19 RX ORDER — POTASSIUM CHLORIDE 20 MEQ/1
20 TABLET, EXTENDED RELEASE ORAL EVERY MORNING
Qty: 5 TABLET | Refills: 0 | Status: SHIPPED | OUTPATIENT
Start: 2022-07-19 | End: 2022-07-24

## 2022-07-24 NOTE — PROCEDURES
DIAGNOSTIC HOME SLEEP TEST (HST) REPORT       PATIENT ID:  NAME:  Soraida Fregoso  MRN:               [id number]  YOB: 1970  DATE OF STUDY: 7/11/22      Impression:     This study shows evidence of:     1.Mild obstructive sleep apnea with  Respiratory Event Index (FITO) of 11.3 per hour and worse in supine sleep with FITO at 25.2. These findings are based on the recording time (flow evaluation time). It is not possible with this device to determine a traditional apnea+hypopnea index (AHI) for total sleep time since EEG channels are not available.     2.  O2 Sat. behzad was 66% and mean O2 sat was 85% and baseline O2 at 91 %. O2 sat was below 88% for 90% of the flow evaluation time. Oxygen Desaturation (>=3%) Index was elevated at 13.5/hr. AVG HR was 74 BPM.      TECHNICAL DESCRIPTION:  [employer] Device used was a type-III home study device. Home sleep study recording included: Airflow recording by nasal pressure transducer; Respiratory Effort by abdominal Respiratory Bands; O2 by finger oximetry. A position sensor and a snore channel was also used.    Scoring Criteria: A modification of the the AASM Manual for the Scoring of Sleep and Associated Events, 2012, was used.   Obstructive apnea was scored by cessation of airflow for at least 10 seconds with continuing respiratory effort.  Central apnea was scored by cessation of airflow for at least 10 seconds with no effort.  Hypopnea was scored by a 30% or more reduction in airflow for at least 10 seconds accompanied by an arterial oxygen desaturation of 3% or more.  (For Medicare patients, hypopneas were scored by a 30% or more reduction in airflow for at least 10 seconds accompanied by an arterial oxygen saturation of 4% or more, as required by their insurance, CMS.        General sleep summary: . Total recording time is 7 hours and 16 minutes and total flow evaluation time is 7 hours and 5 minutes. The patient spent 1 hours and 54 minutes in the  supine position.    Respiratory events:    Apneas: 1 (Obstructive apnea index 0.1/hr, Central apnea index 0 /hr, mixed 0 /hour)  Hypopneas: 79    Recommendations:    1. CPAP titration study vs Auto CPAP trial .   2.   In general patients with sleep apnea are advised to avoid alcohol and sedatives and to not operate a motor vehicle while drowsy. In some cases alternative treatment options may prove effective in resolving sleep apnea in these options include upper airway surgery, the use of a dental orthotic or weight loss and positional therapy. Clinical correlation is required.         Ange Joseph MD

## 2022-07-26 DIAGNOSIS — R06.02 SHORTNESS OF BREATH: ICD-10-CM

## 2022-07-26 DIAGNOSIS — R60.0 LOWER EXTREMITY EDEMA: ICD-10-CM

## 2022-07-29 ENCOUNTER — HOSPITAL ENCOUNTER (OUTPATIENT)
Dept: RADIOLOGY | Facility: MEDICAL CENTER | Age: 52
End: 2022-07-29
Attending: FAMILY MEDICINE
Payer: COMMERCIAL

## 2022-07-29 DIAGNOSIS — Z12.31 VISIT FOR SCREENING MAMMOGRAM: ICD-10-CM

## 2022-07-29 PROCEDURE — 77063 BREAST TOMOSYNTHESIS BI: CPT

## 2022-08-03 DIAGNOSIS — R60.0 LOWER EXTREMITY EDEMA: ICD-10-CM

## 2022-08-03 DIAGNOSIS — R06.02 SHORTNESS OF BREATH: ICD-10-CM

## 2022-09-02 ENCOUNTER — OFFICE VISIT (OUTPATIENT)
Dept: SLEEP MEDICINE | Facility: MEDICAL CENTER | Age: 52
End: 2022-09-02
Payer: COMMERCIAL

## 2022-09-02 VITALS
DIASTOLIC BLOOD PRESSURE: 72 MMHG | HEIGHT: 67 IN | BODY MASS INDEX: 35.47 KG/M2 | HEART RATE: 60 BPM | OXYGEN SATURATION: 96 % | RESPIRATION RATE: 16 BRPM | SYSTOLIC BLOOD PRESSURE: 122 MMHG | WEIGHT: 226 LBS

## 2022-09-02 DIAGNOSIS — G47.33 OSA (OBSTRUCTIVE SLEEP APNEA): ICD-10-CM

## 2022-09-02 DIAGNOSIS — I10 ESSENTIAL HYPERTENSION: Chronic | ICD-10-CM

## 2022-09-02 PROCEDURE — 99213 OFFICE O/P EST LOW 20 MIN: CPT | Performed by: NURSE PRACTITIONER

## 2022-09-02 RX ORDER — ESTRADIOL 1.25 MG/1.25G
GEL TOPICAL
COMMUNITY
Start: 2022-07-29

## 2022-09-02 RX ORDER — LEFLUNOMIDE 10 MG/1
TABLET ORAL
COMMUNITY
Start: 2022-08-06

## 2022-09-02 ASSESSMENT — FIBROSIS 4 INDEX: FIB4 SCORE: 1.18

## 2022-09-02 NOTE — PROGRESS NOTES
Chief Complaint   Patient presents with    Follow-Up     sleep study results 7/11/2022  last seen on 5/16/2022 - Dr. Joseph         HPI:      Mrs. Fregoso is a 53 y/o female patient who is in today for SABINE f/u and sleep study results.  Patient works as a .  PMH includes heterozygous MTHFR mutation [id number], heterozygous MTHFR mutation C677T, insomnia, SABINE, obesity, asthma, GERD, polycystic ovaries, mixed hyperlipidemia, hypertension, RA, never smoker.    Patient is in today to review treatment options for management of SABINE.  She goes to bed between 12-12:30 am and wakes up at 7:30 am. She reports morning headache and snoring.  She does report feeling tired throughout the day.  She tends to toss and turn throughout the night and sometimes does have trouble going back to sleep.  She also awakens several times to use the restroom.  Patient has consulted with Dr. Byrd in the past but was not interested in the dental device due to cost.  Patient stays fairly active while at work but outside of work does not follow an exercise regimen.  She denies any new health problems or medications.    Sleep Study History:   HSS from 7/11/22 indicated mild obstructive sleep apnea with  Respiratory Event Index (FITO) of 11.3 per hour and worse in supine sleep with FITO at 25.2. Apneas: 1 (Obstructive apnea index 0.1/hr, Central apnea index 0 /hr, mixed 0 /hour), Hypopneas: 79. O2 Sat. behzad was 66% and mean O2 sat was 85% and baseline O2 at 91 %. O2 sat was below 88% for 90% of the flow evaluation time. Oxygen Desaturation (>=3%) Index was elevated at 13.5/hr. AVG HR was 74 BPM.    ROS:    Constitutional: Denies fevers, Denies weight changes  Eyes: Denies changes in vision, no eye pain  Ears/Nose/Throat/Mouth: Denies nasal congestion or sore throat   Cardiovascular: Denies chest pain or palpitations   Respiratory: Denies shortness of breath , Denies cough  Gastrointestinal/Hepatic: Denies abdominal pain, nausea, vomiting,  "  Skin/Breast: Denies rash,   Neurological: Denies headache, confusion,   Psychiatric: denies mood disorder   Sleep: denies snoring       Past Medical History:   Diagnosis Date    Allergic rhinitis     Anemia     improved    Anesthesia     N & V    ASTHMA     Chickenpox     Depression     Dizziness     Fainting     Fatigue     GERD (gastroesophageal reflux disease)     Grave's disease 08/01/2010    Heart burn     Heartburn     Hiatus hernia syndrome     Hoarseness, persistent     Hypertension     Hypothyroid     Hypothyroidism     Influenza     Insomnia     Morning headache     Obesity     Overweight(278.02)     Painful joint     Polycystic ovaries     rheumatoid 01/01/2000    rheumatoid    Rheumatoid arthritis (HCC)     Ringing in ears     Sore muscles     Wears glasses     Wheezing     Whooping cough        Past Surgical History:   Procedure Laterality Date    HYSTERECTOMY ROBOTIC  10/06/2010    Performed by ELSY HINES at SUR[facility]ERY [facility]    CYSTOSCOPY  10/06/2010    Performed by ELSY HINES at [facility]ERY [facility]    KIMI BY LAPAROSCOPY  01/01/2007    LAP BAND SYSTEM  01/01/2006    CHOLECYSTECTOMY      HYSTERECTOMY LAPAROSCOPY      SLEEVE,ARRON VASO THIGH         Family History   Problem Relation Age of Onset    Stroke Father 41    Heart Disease Father         \"hole\" in heart causing stroke    Arthritis Sister         rheumatoid-severe    Asthma Sister     Alcohol/Drug Brother     Cancer Maternal Grandmother     Sleep Apnea Neg Hx        Social History     Socioeconomic History    Marital status:      Spouse name: Not on file    Number of children: Not on file    Years of education: Not on file    Highest education level: Not on file   Occupational History    Not on file   Tobacco Use    Smoking status: Never    Smokeless tobacco: Never   Vaping Use    Vaping Use: Never used   Substance and Sexual Activity    Alcohol use: No     Comment: twice a year    Drug use: No    Sexual " activity: Yes     Partners: Male   Other Topics Concern    Not on file   Social History Narrative    Not on file     Social Determinants of Health     Financial Resource Strain: Not on file   Food Insecurity: Not on file   Transportation Needs: Not on file   Physical Activity: Not on file   Stress: Not on file   Social Connections: Not on file   Intimate Partner Violence: Not on file   Housing Stability: Not on file       Allergies as of 09/02/2022 - Reviewed 09/02/2022   Allergen Reaction Noted    Sulfa drugs Swelling 07/13/2007    Amoxicillin Rash 10/06/2010        Vitals:  Vitals:    09/02/22 1000   BP: 122/72   Pulse: 60   Resp: 16   SpO2: 96%       Current medications as of today   Current Outpatient Medications   Medication Sig Dispense Refill    methylPREDNISolone (MEDROL DOSEPAK) 4 MG Tablet Therapy Pack As directed on the packaging label. 21 Tablet 0    albuterol 108 (90 Base) MCG/ACT Aero Soln inhalation aerosol Inhale 2 Puffs every 6 hours as needed for Shortness of Breath. 8.5 g 12    beclomethasone HFA (QVAR REDIHALER) 80 MCG/ACT inhaler Inhale 2 Puffs 2 times a day. 10.6 g 12    fluticasone-salmeterol (ADVAIR) 100-50 MCG/ACT AEROSOL POWDER, BREATH ACTIVATED Inhale 1 Puff every 12 hours. 60 Each 12    levalbuterol (XOPENEX) 0.63 MG/3ML Nebu Soln Take 3 mL by nebulization every four hours as needed for Shortness of Breath. 75 mL 6    SYNTHROID 125 MCG Tab Take 1 Tablet by mouth every morning on an empty stomach. 90 Tablet 2    losartan (COZAAR) 50 MG Tab Take 1 Tablet by mouth every day. 90 Tablet 3    escitalopram (LEXAPRO) 20 MG tablet Take 1 Tablet by mouth every day. 90 Tablet 3    mometasone (NASONEX) 50 MCG/ACT nasal spray Administer 2 Sprays into affected nostril(S) every day. 17 g 6    terbinafine (LAMISIL) 250 MG Tab TAKE 1 TABLET BY MOUTH EVERY DAY AFTER MEALS  0    ORENCIA CLICKJECT 125 MG/ML Solution Auto-injector       COD LIVER OIL PO Take  by mouth.      Evening Primrose Oil 500 MG Cap  Take 1 Cap by mouth 2 Times a Day.      Estradiol (EVAMIST) 1.53 MG/SPRAY SOLN Apply 1-3 Sprays to skin as directed every day. (Patient taking differently: Place 2 Sprays on the skin every day.) 1 Bottle 0    leflunomide (ARAVA) 20 MG TABS Take 1 Tab by mouth every day. 90 Tab 3    Cholecalciferol (VITAMIN D) 2000 UNITS CAPS Take 1 Cap by mouth every day. 30 Cap     multivitamin (THERAGRAN) per tablet Take 1 Tab by mouth every day.      cyclosporin (RESTASIS) 0.05 % ophthalmic emulsion Place 1 Drop in both eyes 2 times a day. 1 Each 3    leflunomide (ARAVA) 10 MG Tab Alternates with 20 mg      DIVIGEL 1.25 MG/1.25GM Gel APPLY 1 PACKET TRANSDERMALLY ONCE DAILY       No current facility-administered medications for this visit.         Physical Exam: Limited by COVID-19 precautions.  Appearance: Well developed, well nourished, no acute distress  Eyes: PERRL, EOM intact, sclera white, conjunctiva moist  Ears: no lesions or deformities  Hearing: grossly intact  Nose: no lesions or deformities  Respiratory effort: no intercostal retractions or use of accessory muscles  Extremities: no cyanosis or edema  Abdomen: soft  Gait and Station: normal  Digits and nails: no clubbing, cyanosis, petechiae or nodes.  Cranial nerves: grossly intact  Skin: no visible rashes, lesions or ulcers noted  Orientation: Oriented to time, person and place  Mood and affect: mood and affect appropriate, normal interaction with examiner  Judgement: Intact    Assessment:  1. SABINE (obstructive sleep apnea)  DME CPAP      2. Essential hypertension        3. BMI 35.0-35.9,adult  Patient identified as having weight management issue.  Appropriate orders and counseling given.            Plan  Discussed the cardiovascular and neuropsychiatric risks of untreated SABINE; including but not limited to: HTN, DM, MI, ASCVD, CVA, CHF, traffic accidents.     1. HSS from 7/11/22 indicated mild obstructive sleep apnea with  Respiratory Event Index (FITO) of 11.3 per hour  and worse in supine sleep with FITO at 25.2. Apneas: 1 (Obstructive apnea index 0.1/hr, Central apnea index 0 /hr, mixed 0 /hour), Hypopneas: 79. O2 Sat. behzad was 66% and mean O2 sat was 85% and baseline O2 at 91 %. O2 sat was below 88% for 90% of the flow evaluation time. Oxygen Desaturation (>=3%) Index was elevated at 13.5/hr. AVG HR was 74 BPM  Recommendation:  After review auto CPAP will be initiated.  Other treatment options including mandibular advancement device, UPPP, positional, weight loss and inspire implant therapy were reviewed with the patient.  Based on study results the patient does not qualify for inspire implant therapy and due to elevated BMI.  Patient defers mandibular advancement device at this time as well as UPPP.  Patient is also advised to avoid the supine position, alcohol 4 hours prior to bedtime, sedatives and opioids as all can exacerbate apnea.    *Patient is amenable to CPAP therapy.  DME order ([facility]) for ResMed autoCPAP 5-15 cmH2O, mask (MOC) and supplies was placed today. Clean mask and supplies weekly with soap and water, and change supplies per insurance guidelines. Advised patient to use the CPAP every night for more than four hours for optimal health benefit and to meet the health insurance 70% compliance guideline. Advised patient she may change the mask out if needed within the first 30 days after she receives her equipment.     2. Continue to f/u with PCP.   3. Encouraged a healthy diet and exercise to help with weight loss and management.   If your BMI is 25-29.9 you are overweight. If your BMI is 30 or greater you are obese. To lose weight eat less, move more, or both. Any diet that reduces caloric intake can help with weight loss. Extra weight may reduce your lifespan. Avoid dramatic unsustainable dietary changes that result in the yo-yo effect (down then back up.)  Usually small modifications in diet and exercise are easier to stick with.  4. Sleep hygiene discussed.  Recommend keeping a set sleep/wake schedule. Logging enough hours of sleep. Limiting/Avoiding naps. No caffeine after noon and no heavy meals in the evening.   5. Follow up with appropriate healthcare providers for all other medical problems.  6. F/u in 4 months for first compliance, SABINE.       OLIVIA Mobley.      This dictation was created using voice recognition software. The accuracy of the dictation is limited to the abilities of the software. I expect there may be some errors of grammar and possibly content.

## 2022-09-02 NOTE — PATIENT INSTRUCTIONS
Mandibular advancement device     A mandibular splint or mandibular advancement splint is a prescription custom-made medical device worn in the mouth used to treat sleep-related breathing disorders including: obstructive sleep apnea, snoring, and TMJ disorders. These devices are also known as mandibular advancement devices, sleep apnea oral appliances, oral airway dilators and sleep apnea mouth guards.          Articles on dental appliance    https://www.jwfo.org/article/-7476(52)78898-X/fulltext    https://www.ncbi.nlm.nih.gov/pmc/articles/XQN8700962/        Oral Appliance Therapy Doctors  You can check with your insurance which one of these doctors is contracted with your insurance.      [facility]   Dr. Derik Yusuf  [address] [place] [address] [phone]    [facility]  Dr. Noam Arango  [address] [place] [address] [phone]    [facility]   Dr. Alvin Nichole   [address] [address] [phone]    [facility]   Dr. Ramy Byrd   [address] [place] [id number] [phone]    [facility]  Dr. Rosa Walsh   [address] [place] [id number] [phone] Fax: [phone]    [facility]   Dr. Harsha Rodarte  [address] [place] [address] [phone]

## 2022-09-30 DIAGNOSIS — R60.0 LOWER EXTREMITY EDEMA: ICD-10-CM

## 2022-09-30 DIAGNOSIS — J45.41 MODERATE PERSISTENT REACTIVE AIRWAY DISEASE WITH ACUTE EXACERBATION: ICD-10-CM

## 2022-09-30 RX ORDER — FUROSEMIDE 20 MG/1
20 TABLET ORAL
Qty: 30 TABLET | Refills: 1 | Status: SHIPPED | OUTPATIENT
Start: 2022-09-30

## 2022-09-30 RX ORDER — POTASSIUM CHLORIDE 20 MEQ/1
20 TABLET, EXTENDED RELEASE ORAL
Qty: 30 TABLET | Refills: 1 | Status: SHIPPED | OUTPATIENT
Start: 2022-09-30

## 2022-09-30 RX ORDER — METHYLPREDNISOLONE 4 MG/1
TABLET ORAL
Qty: 21 TABLET | Refills: 0 | Status: SHIPPED | OUTPATIENT
Start: 2022-09-30

## 2022-11-14 ENCOUNTER — TELEPHONE (OUTPATIENT)
Dept: SCHEDULING | Facility: IMAGING CENTER | Age: 52
End: 2022-11-14

## 2022-12-06 ENCOUNTER — OFFICE VISIT (OUTPATIENT)
Dept: INTERNAL MEDICINE | Facility: IMAGING CENTER | Age: 52
End: 2022-12-06
Payer: COMMERCIAL

## 2022-12-06 VITALS
HEART RATE: 65 BPM | BODY MASS INDEX: 35.64 KG/M2 | HEIGHT: 67 IN | TEMPERATURE: 97.9 F | OXYGEN SATURATION: 95 % | DIASTOLIC BLOOD PRESSURE: 70 MMHG | SYSTOLIC BLOOD PRESSURE: 122 MMHG | RESPIRATION RATE: 17 BRPM | WEIGHT: 227.07 LBS

## 2022-12-06 DIAGNOSIS — J06.9 UPPER RESPIRATORY TRACT INFECTION, UNSPECIFIED TYPE: ICD-10-CM

## 2022-12-06 PROCEDURE — 99213 OFFICE O/P EST LOW 20 MIN: CPT | Performed by: FAMILY MEDICINE

## 2022-12-06 RX ORDER — AZITHROMYCIN 250 MG/1
TABLET, FILM COATED ORAL
Qty: 6 TABLET | Refills: 0 | Status: SHIPPED | OUTPATIENT
Start: 2022-12-06

## 2022-12-06 RX ORDER — AZITHROMYCIN 250 MG/1
TABLET, FILM COATED ORAL
Qty: 6 TABLET | Refills: 0 | Status: SHIPPED | OUTPATIENT
Start: 2022-12-06 | End: 2022-12-06

## 2022-12-06 RX ORDER — ESTRADIOL 2 MG/1
RING VAGINAL
COMMUNITY
Start: 2022-09-19

## 2022-12-06 RX ORDER — METHYLPREDNISOLONE 4 MG/1
TABLET ORAL
Qty: 21 TABLET | Refills: 0 | Status: SHIPPED | OUTPATIENT
Start: 2022-12-06

## 2022-12-06 ASSESSMENT — FIBROSIS 4 INDEX: FIB4 SCORE: 1.18

## 2022-12-06 NOTE — PROGRESS NOTES
"Chief Complaint   Patient presents with    Sinus Problem     Started the Sunday after Thanksgiving. Has tried numerous OTC with no relief. Sinus congestion, ear fullness, cough, fatigue.        HPI:  Patient is a 52 y.o. female established patient who presents today for evaluation of new illness symptoms that have been present for greater than one week. She reports ongoing sinus congestion, general malaise, bilateral ear fullness, dry cough involving chest without associated N/V/D/F. She has been taking appropriate OTC medications as well as using her inhalers/nebulizer treatments without symptom resolution. She has tested negative for COVID prior to our visit and is otherwise stable at this time.     Patient Active Problem List    Diagnosis Date Noted    Obesity (BMI 30-39.9) 01/24/2020    Primary insomnia 10/07/2018    Rheumatoid arthritis involving multiple sites with positive rheumatoid factor (HCC) 05/17/2017    Essential hypertension 11/11/2016    Family history of stroke 04/13/2016    Hypothyroidism 08/28/2015    Heterozygous MTHFR mutation [id number] 11/07/2014    Heterozygous MTHFR mutation C677T 11/07/2014    Gluten intolerance 05/16/2014    Mixed hyperlipidemia with apolipoprotein E4 variant 05/16/2014    Vitamin D deficiency disease 05/16/2014    GERD (gastroesophageal reflux disease)     Polycystic ovaries     Dysthymic disorder 11/07/2012    Asthma 11/07/2012    Premature surgical menopause on HRT 11/07/2012    Dry eyes 11/07/2012       Past medical, surgical, family, and social history was reviewed and updated in Epic chart by me today.     Medications and allergies reviewed and updated in Epic chart by me today.     ROS:  Pertinent positives listed above in HPI. All other systems have been reviewed and are negative.    PE:   /70 (BP Location: Left arm, Patient Position: Sitting, BP Cuff Size: Adult)   Pulse 65   Temp 36.6 °C (97.9 °F) (Temporal)   Resp 17   Ht 1.702 m (5' 7\")   Wt 103 kg (227 " lb 1.2 oz)   LMP 10/06/2010   SpO2 95%   BMI 35.56 kg/m²   Vital signs reviewed with patient.     Gen: Well developed; well nourished; no acute distress; tired appearance   HEENT: Normocephalic; atraumatic; PEERLA b/l; sclera clear b/l; b/l external auditory canals WNL; b/l TM WNL; nares patent; oropharynx clear but inflammed; oral mucosa moist; tongue midline; dentition adequate after mask removed; congested   Neck: No adenopathy; no thyromegaly  CV: Regular rate and rhythm; S1/ S2 present; no murmur, gallop or rub noted  Pulm: No respiratory distress; decreased BS b/l; no wheezing or stridor noted b/l; dry hacking cough present  Abd: Adequate bowel sounds noted; soft and nontender; no rebound, rigidity, nor distention  Extremities: No peripheral edema b/l LE extremities/ no clubbing nor cyanosis noted  Skin: Warm and dry; no rashes noted   Neuro: No focal deficits noted   Psych: AAOx4; mood and affect are appropriate    A/P:  1. Upper respiratory tract infection, unspecified type  Patient has been ill for greater than one week and has failed appropriate OTC use. Recommend patient start Z pack and medrol dose pack use today, rest, hydrate, use Delsym cough medication PRN, and follow clinical response. 2 new RX sent to pharmacy.   - methylPREDNISolone (MEDROL DOSEPAK) 4 MG Tablet Therapy Pack; As directed on the packaging label.  Dispense: 21 Tablet; Refill: 0

## 2022-12-20 DIAGNOSIS — R05.2 SUBACUTE COUGH: ICD-10-CM

## 2022-12-20 RX ORDER — BENZONATATE 200 MG/1
200 CAPSULE ORAL 3 TIMES DAILY PRN
Qty: 30 CAPSULE | Refills: 2 | Status: SHIPPED | OUTPATIENT
Start: 2022-12-20

## 2022-12-20 RX ORDER — BENZONATATE 100 MG/1
100 CAPSULE ORAL 3 TIMES DAILY PRN
Qty: 30 CAPSULE | Refills: 2 | Status: SHIPPED | OUTPATIENT
Start: 2022-12-20

## 2023-01-06 DIAGNOSIS — I10 ESSENTIAL HYPERTENSION: ICD-10-CM

## 2023-01-06 RX ORDER — LOSARTAN POTASSIUM 50 MG/1
50 TABLET ORAL
Qty: 90 TABLET | Refills: 3 | Status: SHIPPED | OUTPATIENT
Start: 2023-01-06

## 2023-01-26 DIAGNOSIS — Z98.84 HX OF LAPAROSCOPIC GASTRIC BANDING: ICD-10-CM

## 2023-01-26 DIAGNOSIS — R10.13 EPIGASTRIC PAIN: ICD-10-CM

## 2023-01-27 ENCOUNTER — HOSPITAL ENCOUNTER (OUTPATIENT)
Dept: RADIOLOGY | Facility: MEDICAL CENTER | Age: 53
End: 2023-01-27
Attending: FAMILY MEDICINE
Payer: COMMERCIAL

## 2023-01-27 ENCOUNTER — APPOINTMENT (OUTPATIENT)
Dept: SLEEP MEDICINE | Facility: MEDICAL CENTER | Age: 53
End: 2023-01-27
Payer: COMMERCIAL

## 2023-01-27 ENCOUNTER — NON-PROVIDER VISIT (OUTPATIENT)
Dept: INTERNAL MEDICINE | Facility: IMAGING CENTER | Age: 53
End: 2023-01-27
Payer: COMMERCIAL

## 2023-01-27 ENCOUNTER — HOSPITAL ENCOUNTER (OUTPATIENT)
Facility: MEDICAL CENTER | Age: 53
End: 2023-01-27
Attending: FAMILY MEDICINE
Payer: COMMERCIAL

## 2023-01-27 DIAGNOSIS — Z98.84 HX OF LAPAROSCOPIC GASTRIC BANDING: ICD-10-CM

## 2023-01-27 DIAGNOSIS — R10.13 EPIGASTRIC PAIN: ICD-10-CM

## 2023-01-27 LAB
ALBUMIN SERPL BCP-MCNC: 4.1 G/DL (ref 3.2–4.9)
ALBUMIN/GLOB SERPL: 1.7 G/DL
ALP SERPL-CCNC: 106 U/L (ref 30–99)
ALT SERPL-CCNC: 17 U/L (ref 2–50)
ANION GAP SERPL CALC-SCNC: 8 MMOL/L (ref 7–16)
AST SERPL-CCNC: 21 U/L (ref 12–45)
BASOPHILS # BLD AUTO: 1 % (ref 0–1.8)
BASOPHILS # BLD: 0.04 K/UL (ref 0–0.12)
BILIRUB SERPL-MCNC: 0.4 MG/DL (ref 0.1–1.5)
BUN SERPL-MCNC: 13 MG/DL (ref 8–22)
CALCIUM ALBUM COR SERPL-MCNC: 9 MG/DL (ref 8.5–10.5)
CALCIUM SERPL-MCNC: 9.1 MG/DL (ref 8.5–10.5)
CHLORIDE SERPL-SCNC: 105 MMOL/L (ref 96–112)
CO2 SERPL-SCNC: 28 MMOL/L (ref 20–33)
CREAT SERPL-MCNC: 0.69 MG/DL (ref 0.5–1.4)
EOSINOPHIL # BLD AUTO: 0.31 K/UL (ref 0–0.51)
EOSINOPHIL NFR BLD: 7.9 % (ref 0–6.9)
ERYTHROCYTE [DISTWIDTH] IN BLOOD BY AUTOMATED COUNT: 43.4 FL (ref 35.9–50)
GFR SERPLBLD CREATININE-BSD FMLA CKD-EPI: 104 ML/MIN/1.73 M 2
GLOBULIN SER CALC-MCNC: 2.4 G/DL (ref 1.9–3.5)
GLUCOSE SERPL-MCNC: 92 MG/DL (ref 65–99)
HCT VFR BLD AUTO: 46.1 % (ref 37–47)
HGB BLD-MCNC: 14.9 G/DL (ref 12–16)
IMM GRANULOCYTES # BLD AUTO: 0 K/UL (ref 0–0.11)
IMM GRANULOCYTES NFR BLD AUTO: 0 % (ref 0–0.9)
LYMPHOCYTES # BLD AUTO: 1.51 K/UL (ref 1–4.8)
LYMPHOCYTES NFR BLD: 38.6 % (ref 22–41)
MCH RBC QN AUTO: 29.7 PG (ref 27–33)
MCHC RBC AUTO-ENTMCNC: 32.3 G/DL (ref 33.6–35)
MCV RBC AUTO: 92 FL (ref 81.4–97.8)
MONOCYTES # BLD AUTO: 0.34 K/UL (ref 0–0.85)
MONOCYTES NFR BLD AUTO: 8.7 % (ref 0–13.4)
NEUTROPHILS # BLD AUTO: 1.71 K/UL (ref 2–7.15)
NEUTROPHILS NFR BLD: 43.8 % (ref 44–72)
NRBC # BLD AUTO: 0 K/UL
NRBC BLD-RTO: 0 /100 WBC
PLATELET # BLD AUTO: 204 K/UL (ref 164–446)
PMV BLD AUTO: 11.3 FL (ref 9–12.9)
POTASSIUM SERPL-SCNC: 3.8 MMOL/L (ref 3.6–5.5)
PROT SERPL-MCNC: 6.5 G/DL (ref 6–8.2)
RBC # BLD AUTO: 5.01 M/UL (ref 4.2–5.4)
SODIUM SERPL-SCNC: 141 MMOL/L (ref 135–145)
WBC # BLD AUTO: 3.9 K/UL (ref 4.8–10.8)

## 2023-01-27 PROCEDURE — 80053 COMPREHEN METABOLIC PANEL: CPT

## 2023-01-27 PROCEDURE — 76700 US EXAM ABDOM COMPLETE: CPT

## 2023-01-27 PROCEDURE — 85025 COMPLETE CBC W/AUTO DIFF WBC: CPT

## 2023-01-30 DIAGNOSIS — Z98.84 HX OF LAPAROSCOPIC GASTRIC BANDING: ICD-10-CM

## 2023-01-30 DIAGNOSIS — R10.13 EPIGASTRIC PAIN: ICD-10-CM

## 2023-02-10 ENCOUNTER — NON-PROVIDER VISIT (OUTPATIENT)
Dept: INTERNAL MEDICINE | Facility: IMAGING CENTER | Age: 53
End: 2023-02-10
Payer: COMMERCIAL

## 2023-02-10 ENCOUNTER — HOSPITAL ENCOUNTER (OUTPATIENT)
Facility: MEDICAL CENTER | Age: 53
End: 2023-02-10
Attending: NURSE PRACTITIONER
Payer: COMMERCIAL

## 2023-02-10 LAB
25(OH)D3 SERPL-MCNC: 30 NG/ML (ref 30–100)
ALBUMIN SERPL BCP-MCNC: 4.1 G/DL (ref 3.2–4.9)
ALBUMIN/GLOB SERPL: 1.6 G/DL
ALP SERPL-CCNC: 103 U/L (ref 30–99)
ALT SERPL-CCNC: 19 U/L (ref 2–50)
ANION GAP SERPL CALC-SCNC: 6 MMOL/L (ref 7–16)
AST SERPL-CCNC: 25 U/L (ref 12–45)
BASOPHILS # BLD AUTO: 0.6 % (ref 0–1.8)
BASOPHILS # BLD: 0.02 K/UL (ref 0–0.12)
BILIRUB SERPL-MCNC: 0.4 MG/DL (ref 0.1–1.5)
BUN SERPL-MCNC: 16 MG/DL (ref 8–22)
CALCIUM ALBUM COR SERPL-MCNC: 9.1 MG/DL (ref 8.5–10.5)
CALCIUM SERPL-MCNC: 9.2 MG/DL (ref 8.5–10.5)
CHLORIDE SERPL-SCNC: 108 MMOL/L (ref 96–112)
CHOLEST SERPL-MCNC: 173 MG/DL (ref 100–199)
CO2 SERPL-SCNC: 25 MMOL/L (ref 20–33)
CREAT SERPL-MCNC: 0.68 MG/DL (ref 0.5–1.4)
EOSINOPHIL # BLD AUTO: 0.26 K/UL (ref 0–0.51)
EOSINOPHIL NFR BLD: 7.2 % (ref 0–6.9)
ERYTHROCYTE [DISTWIDTH] IN BLOOD BY AUTOMATED COUNT: 42.2 FL (ref 35.9–50)
GFR SERPLBLD CREATININE-BSD FMLA CKD-EPI: 105 ML/MIN/1.73 M 2
GLOBULIN SER CALC-MCNC: 2.5 G/DL (ref 1.9–3.5)
GLUCOSE SERPL-MCNC: 94 MG/DL (ref 65–99)
HCT VFR BLD AUTO: 45 % (ref 37–47)
HDLC SERPL-MCNC: 65 MG/DL
HGB BLD-MCNC: 14.9 G/DL (ref 12–16)
IMM GRANULOCYTES # BLD AUTO: 0 K/UL (ref 0–0.11)
IMM GRANULOCYTES NFR BLD AUTO: 0 % (ref 0–0.9)
LDLC SERPL CALC-MCNC: 91 MG/DL
LYMPHOCYTES # BLD AUTO: 1.32 K/UL (ref 1–4.8)
LYMPHOCYTES NFR BLD: 36.5 % (ref 22–41)
MCH RBC QN AUTO: 30 PG (ref 27–33)
MCHC RBC AUTO-ENTMCNC: 33.1 G/DL (ref 33.6–35)
MCV RBC AUTO: 90.5 FL (ref 81.4–97.8)
MONOCYTES # BLD AUTO: 0.31 K/UL (ref 0–0.85)
MONOCYTES NFR BLD AUTO: 8.6 % (ref 0–13.4)
NEUTROPHILS # BLD AUTO: 1.71 K/UL (ref 2–7.15)
NEUTROPHILS NFR BLD: 47.1 % (ref 44–72)
NRBC # BLD AUTO: 0 K/UL
NRBC BLD-RTO: 0 /100 WBC
PLATELET # BLD AUTO: 189 K/UL (ref 164–446)
PMV BLD AUTO: 11.9 FL (ref 9–12.9)
POTASSIUM SERPL-SCNC: 4 MMOL/L (ref 3.6–5.5)
PROT SERPL-MCNC: 6.6 G/DL (ref 6–8.2)
RBC # BLD AUTO: 4.97 M/UL (ref 4.2–5.4)
SODIUM SERPL-SCNC: 139 MMOL/L (ref 135–145)
T4 FREE SERPL-MCNC: 1.21 NG/DL (ref 0.93–1.7)
TRIGL SERPL-MCNC: 84 MG/DL (ref 0–149)
TSH SERPL DL<=0.005 MIU/L-ACNC: 0.53 UIU/ML (ref 0.38–5.33)
WBC # BLD AUTO: 3.6 K/UL (ref 4.8–10.8)

## 2023-02-10 PROCEDURE — 80061 LIPID PANEL: CPT

## 2023-02-10 PROCEDURE — 84443 ASSAY THYROID STIM HORMONE: CPT

## 2023-02-10 PROCEDURE — 80053 COMPREHEN METABOLIC PANEL: CPT

## 2023-02-10 PROCEDURE — 84439 ASSAY OF FREE THYROXINE: CPT

## 2023-02-10 PROCEDURE — 82306 VITAMIN D 25 HYDROXY: CPT

## 2023-02-10 PROCEDURE — 85025 COMPLETE CBC W/AUTO DIFF WBC: CPT

## 2023-02-21 NOTE — PROGRESS NOTES
[facility] Follow-up Visit    Date of Visit: 2/24/2023     CC:  Follow-up for SABINE management      HPI:  Soraida Fregoso is a very pleasant 52 y.o. year old female never smoker, with a PMHx of SABINE, heterozygous MTHFR mutation [id number], heterozygous MTHFR mutation C677T, insomnia, obesity, asthma, GERD, polycystic ovaries, mixed hyperlipidemia, hypertension, RA  who presented to the [facility] for a regular follow up. Last seen in the office on 2/8/2023 with HIRAL Mobley.     Patient presents for compliance.  Patient states she has been doing good overall.  She does state that she has morning headaches, which she attributes to TMJ.  She reports that her daytime drowsiness has improved since starting CPAP therapy, but is still there.  She also has a dry mouth.  She reports when she first put on her CPAP mask that she will have leakage.  She also uses a CPAP barrier cough due to skin irritation.  Patient goes to bed between 10 PM-12 AM and wakes up between 8-10 AM.  She wakens twice at night to use the bathroom but does not have any issues 5 back asleep.  She will occasionally nap on the weekends for 1-2 hours.  She is also requesting to switch DME companies, as she finds it difficult to work with [employer].    DME provider: [employer]  Device: [employer] AirSense 11   Settings: AutoCPAP 5-15 CWP  When: 9/2022  Mask: FFM  Chin strap: No     Cleaning regimen: Once a week with CPAP wipes and soap and water    Compliance:  Compliance data reviewed showing 100% usage > 4hours in last 30 days. Average AHI 0.6 events/hour. 95% pressure 8.4 CWP. 95% leaks 1.7 L/min. Patient continues to use and benefit from machine.      Sleep History:  HSS 7/11/2022-      Patient Active Problem List    Diagnosis Date Noted    SABINE on CPAP 02/24/2023    Dry mouth 02/24/2023    Obesity (BMI 30-39.9) 01/24/2020    Primary insomnia 10/07/2018    Rheumatoid arthritis involving multiple sites with positive rheumatoid factor (HCC) 05/17/2017  "   Essential hypertension 11/11/2016    Family history of stroke 04/13/2016    Hypothyroidism 08/28/2015    Heterozygous MTHFR mutation [id number] 11/07/2014    Heterozygous MTHFR mutation C677T 11/07/2014    Gluten intolerance 05/16/2014    Mixed hyperlipidemia with apolipoprotein E4 variant 05/16/2014    Vitamin D deficiency disease 05/16/2014    GERD (gastroesophageal reflux disease)     Polycystic ovaries     Dysthymic disorder 11/07/2012    Asthma 11/07/2012    Premature surgical menopause on HRT 11/07/2012    Dry eyes 11/07/2012     Past Medical History:   Diagnosis Date    Allergic rhinitis     Anemia     improved    Anesthesia     N & V    ASTHMA     Chickenpox     Depression     Dizziness     Fainting     Fatigue     GERD (gastroesophageal reflux disease)     Grave's disease 08/01/2010    Heart burn     Heartburn     Hiatus hernia syndrome     Hoarseness, persistent     Hypertension     Hypothyroid     Hypothyroidism     Influenza     Insomnia     Morning headache     Obesity     Overweight(278.02)     Painful joint     Polycystic ovaries     rheumatoid 01/01/2000    rheumatoid    Rheumatoid arthritis (HCC)     Ringing in ears     Sore muscles     Wears glasses     Wheezing     Whooping cough       Past Surgical History:   Procedure Laterality Date    HYSTERECTOMY ROBOTIC  10/06/2010    Performed by ELSY HINES at SURGERY [facility] ORS    CYSTOSCOPY  10/06/2010    Performed by ELSY HINES at SURGERY [facility] ORS    KIMI BY LAPAROSCOPY  01/01/2007    LAP BAND SYSTEM  01/01/2006    CHOLECYSTECTOMY      HYSTERECTOMY LAPAROSCOPY      SLEEVE,ARRON VASO THIGH       Family History   Problem Relation Age of Onset    Stroke Father 41    Heart Disease Father         \"hole\" in heart causing stroke    Arthritis Sister         rheumatoid-severe    Asthma Sister     Alcohol/Drug Brother     Cancer Maternal Grandmother     Sleep Apnea Neg Hx      Social History     Socioeconomic History    Marital status: "      Spouse name: Not on file    Number of children: Not on file    Years of education: Not on file    Highest education level: Not on file   Occupational History    Not on file   Tobacco Use    Smoking status: Never    Smokeless tobacco: Never   Vaping Use    Vaping Use: Never used   Substance and Sexual Activity    Alcohol use: No     Comment: twice a year    Drug use: No    Sexual activity: Yes     Partners: Male   Other Topics Concern    Not on file   Social History Narrative    Not on file     Social Determinants of Health     Financial Resource Strain: Not on file   Food Insecurity: Not on file   Transportation Needs: Not on file   Physical Activity: Not on file   Stress: Not on file   Social Connections: Not on file   Intimate Partner Violence: Not on file   Housing Stability: Not on file     Current Outpatient Medications   Medication Sig Dispense Refill    venlafaxine XR (EFFEXOR XR) 75 MG CAPSULE SR 24 HR TAKE 1 CAPSULE BY MOUTH ONCE DAILY WITH FOOD      losartan (COZAAR) 50 MG Tab Take 1 Tablet by mouth every day. 90 Tablet 3    ESTRING 2 MG vaginal ring INSERT 1 RING VAGINALLY AND CHANGE EVERY 90 DAYS      methylPREDNISolone (MEDROL DOSEPAK) 4 MG Tablet Therapy Pack As directed on the packaging label. (Patient not taking: Reported on 2/24/2023) 21 Tablet 0    furosemide (LASIX) 20 MG Tab Take 1 Tablet by mouth 1 time a day as needed (leg edema). 30 Tablet 1    potassium chloride SA (KDUR) 20 MEQ Tab CR Take 1 Tablet by mouth 1 time a day as needed (take with Furosemide use). 30 Tablet 1    leflunomide (ARAVA) 10 MG Tab Alternates with 20 mg      albuterol 108 (90 Base) MCG/ACT Aero Soln inhalation aerosol Inhale 2 Puffs every 6 hours as needed for Shortness of Breath. 8.5 g 12    beclomethasone HFA (QVAR REDIHALER) 80 MCG/ACT inhaler Inhale 2 Puffs 2 times a day. 10.6 g 12    fluticasone-salmeterol (ADVAIR) 100-50 MCG/ACT AEROSOL POWDER, BREATH ACTIVATED Inhale 1 Puff every 12 hours. 60 Each 12     levalbuterol (XOPENEX) 0.63 MG/3ML Nebu Soln Take 3 mL by nebulization every four hours as needed for Shortness of Breath. 75 mL 6    SYNTHROID 125 MCG Tab Take 1 Tablet by mouth every morning on an empty stomach. 90 Tablet 2    mometasone (NASONEX) 50 MCG/ACT nasal spray Administer 2 Sprays into affected nostril(S) every day. 17 g 6    ORENCIA CLICKJECT 125 MG/ML Solution Auto-injector       COD LIVER OIL PO Take  by mouth.      Evening Primrose Oil 500 MG Cap Take 1 Cap by mouth 2 Times a Day.      Estradiol (EVAMIST) 1.53 MG/SPRAY SOLN Apply 1-3 Sprays to skin as directed every day. (Patient taking differently: Place 2 Sprays on the skin every day.) 1 Bottle 0    leflunomide (ARAVA) 20 MG TABS Take 1 Tab by mouth every day. 90 Tab 3    Cholecalciferol (VITAMIN D) 2000 UNITS CAPS Take 1 Cap by mouth every day. 30 Cap     multivitamin (THERAGRAN) per tablet Take 1 Tab by mouth every day.      cyclosporin (RESTASIS) 0.05 % ophthalmic emulsion Place 1 Drop in both eyes 2 times a day. 1 Each 3    benzonatate (TESSALON) 100 MG Cap Take 1 Capsule by mouth 3 times a day as needed for Cough. (Patient not taking: Reported on 2/24/2023) 30 Capsule 2    benzonatate (TESSALON) 200 MG capsule Take 1 Capsule by mouth 3 times a day as needed for Cough. (Patient not taking: Reported on 2/24/2023) 30 Capsule 2    methylPREDNISolone (MEDROL DOSEPAK) 4 MG Tablet Therapy Pack As directed on the packaging label. 21 Tablet 0    azithromycin (ZITHROMAX) 250 MG Tab TAKE 2 TABLETS BY MOUTH ON DAY 1, AND THEN TAKE 1 TABLET BY MOUTH ONCE A DAY ON DAY 2 THROUGH DAY 5 (Patient not taking: Reported on 2/24/2023) 6 Tablet 0    DIVIGEL 1.25 MG/1.25GM Gel APPLY 1 PACKET TRANSDERMALLY ONCE DAILY      escitalopram (LEXAPRO) 20 MG tablet Take 1 Tablet by mouth every day. (Patient not taking: Reported on 2/24/2023) 90 Tablet 3    terbinafine (LAMISIL) 250 MG Tab TAKE 1 TABLET BY MOUTH EVERY DAY AFTER MEALS (Patient not taking: Reported on 2/24/2023)   "0     No current facility-administered medications for this visit.      ALLERGIES: Sulfa drugs and Amoxicillin    ROS:  Constitutional: Denies fever, chills, sweats,  weight loss, fatigue  Cardiovascular: Denies chest pain, tightness, palpitations, swelling in legs/feet  Respiratory: Denies shortness of breath, cough, sputum, wheezing, painful breathing   Sleep: per HPI  Gastrointestinal: Denies  difficulty swallowing, nausea, abdominal pain, diarrhea, constipation, heartburn.  Musculoskeletal: Denies painful joints, sore muscles,       PHYSICAL EXAM:  /68 (BP Location: Right arm, Patient Position: Sitting, BP Cuff Size: Adult)   Pulse 76   Resp 16   Ht 1.702 m (5' 7\")   Wt 104 kg (230 lb)   LMP 10/06/2010   SpO2 93% Comment: room air at rest  BMI 36.02 kg/m²   Appearance: Well-nourished, well-developed, no acute distress  Eyes:  No scleral icterus , EOMI  ENMT: No redness of the oropharynx  Lung auscultation:  No wheezes rhonchi rubs or rales  Cardiac: No murmurs, rubs, or gallops; regular rhythm, normal rate; no edema  Musculoskeletal:  Grossly normal; gait and station normal; digits and nails normal  Skin:  No rashes, petechiae, cyanosis  Neurologic: without focal signs; oriented to person, time, place, and purpose; judgement intact  Psychiatric:  No depression, anxiety, agitation  Mallampati score: Class III    Assessment and Plan:    The medical record was reviewed.    Diagnostic and titration nocturnal polysomnogram's, home sleep apnea tests, continuous nocturnal oximetry results, multiple sleep latency tests, and compliance reports reviewed.    Problem List Items Addressed This Visit       SABINE on CPAP     Sleep Apnea:    The pathophysiology of sleep anea and the increased risk of cardiovascular morbidity from untreated sleep apnea is discussed in detail with the patient.  Urged to avoid supine sleep, weight gain and alcoholic beverages since all of these can worsen sleep apnea. Cautioned against " drowsy driving. If feeling sleepy while driving, pull over for a break/nap, rather than persist on the road, in the interest of own safety and that of others on the road.    Compliance download was reviewed and discussed with the patient.     - Order placed for mask and supplies to [facility]  - Compliance was reinforced  - Recommended the patient against the use of Ozone , such as SoClean  - Clean supplies a couple times a week with dish soap and water and air dry  - Recommended the patient change out supplies as recommended for best mask fit and usage of the machine  - Advised patient to reach out via [employer] if any questions or concerns should arise.   - Equipment replacement schedule:  Mask cushion every month  Mask every 6 months  Head gear every 6 months  Tubing every 3 months  Ultra-fine filters 2 times per month  Foam filter every 6 months  Humidifier chamber every 6 months    Has been advised to continue the current AutoCPAP 5-15 CWP, clean equipment frequently, and get new mask and supplies as allowed by insurance and DME. Recommend an earlier appointment, if significant treatment barriers develop.    The risks of untreated sleep apnea were discussed with the patient at length. Patients with sleep apnea are at increased risk of cardiovascular disease including coronary artery disease, systemic arterial hypertension, pulmonary arterial hypertension, cardiac arrythmias, and stroke. The patient was advised to avoid driving a motor vehicle when drowsy.    Positive airway pressure will favorably impact many of the adverse conditions and effects provoked by sleep apnea.         Relevant Orders    DME Mask and Supplies    Dry mouth     Recommendations for dry mouth:  1.  Increase humidity on PAP machine  2.  Biotene toothpaste/mouthwash  3.  XyliMelts lozenges  4.  Increase fluid intake            Have advised the patient to follow up with the appropriate healthcare practitioners for all other medical  problems and issues.    Return in about 1 year (around 2/24/2024), or if symptoms worsen or fail to improve, for compliance .    Please note portions of this record was created using voice recognition software. I have made every reasonable attempt to correct obvious errors, but I expect that there are errors of grammar and possibly content I did not discover before finalizing the note.    Time spent in record review prior to patient arrival, reviewing results, and in face-to-face encounter totaled 20 min.  __________  HIRAL Aleman  Pulmonary & Sleep Medicine  [facility]

## 2023-02-24 ENCOUNTER — OFFICE VISIT (OUTPATIENT)
Dept: SLEEP MEDICINE | Facility: MEDICAL CENTER | Age: 53
End: 2023-02-24
Payer: COMMERCIAL

## 2023-02-24 VITALS
RESPIRATION RATE: 16 BRPM | HEART RATE: 76 BPM | SYSTOLIC BLOOD PRESSURE: 118 MMHG | HEIGHT: 67 IN | WEIGHT: 230 LBS | BODY MASS INDEX: 36.1 KG/M2 | OXYGEN SATURATION: 93 % | DIASTOLIC BLOOD PRESSURE: 68 MMHG

## 2023-02-24 DIAGNOSIS — R68.2 DRY MOUTH: ICD-10-CM

## 2023-02-24 DIAGNOSIS — G47.33 OSA ON CPAP: ICD-10-CM

## 2023-02-24 PROCEDURE — 99213 OFFICE O/P EST LOW 20 MIN: CPT

## 2023-02-24 RX ORDER — VENLAFAXINE HYDROCHLORIDE 75 MG/1
CAPSULE, EXTENDED RELEASE ORAL
COMMUNITY
Start: 2023-02-13

## 2023-02-24 ASSESSMENT — FIBROSIS 4 INDEX: FIB4 SCORE: 1.55

## 2023-02-24 NOTE — ASSESSMENT & PLAN NOTE
Sleep Apnea:    The pathophysiology of sleep anea and the increased risk of cardiovascular morbidity from untreated sleep apnea is discussed in detail with the patient.  Urged to avoid supine sleep, weight gain and alcoholic beverages since all of these can worsen sleep apnea. Cautioned against drowsy driving. If feeling sleepy while driving, pull over for a break/nap, rather than persist on the road, in the interest of own safety and that of others on the road.    Compliance download was reviewed and discussed with the patient.     - Order placed for mask and supplies to [facility]  - Compliance was reinforced  - Recommended the patient against the use of Ozone , such as SoClean  - Clean supplies a couple times a week with dish soap and water and air dry  - Recommended the patient change out supplies as recommended for best mask fit and usage of the machine  - Advised patient to reach out via [email or web address] if any questions or concerns should arise.   - Equipment replacement schedule:  Mask cushion every month  Mask every 6 months  Head gear every 6 months  Tubing every 3 months  Ultra-fine filters 2 times per month  Foam filter every 6 months  Humidifier chamber every 6 months    Has been advised to continue the current AutoCPAP 5-15 CWP, clean equipment frequently, and get new mask and supplies as allowed by insurance and DME. Recommend an earlier appointment, if significant treatment barriers develop.    The risks of untreated sleep apnea were discussed with the patient at length. Patients with sleep apnea are at increased risk of cardiovascular disease including coronary artery disease, systemic arterial hypertension, pulmonary arterial hypertension, cardiac arrythmias, and stroke. The patient was advised to avoid driving a motor vehicle when drowsy.    Positive airway pressure will favorably impact many of the adverse conditions and effects provoked by sleep apnea.

## 2023-02-24 NOTE — PATIENT INSTRUCTIONS
Recommendations for dry mouth:  1.  Increase humidity on PAP machine  2.  Biotene toothpaste/mouthwash  3.  XyliMelts lozenges  4.  Increase fluid intake

## 2023-04-21 ENCOUNTER — HOSPITAL ENCOUNTER (OUTPATIENT)
Dept: LAB | Facility: MEDICAL CENTER | Age: 53
End: 2023-04-21
Attending: NURSE PRACTITIONER
Payer: COMMERCIAL

## 2023-04-21 LAB
ALBUMIN SERPL BCP-MCNC: 4 G/DL (ref 3.2–4.9)
ALBUMIN/GLOB SERPL: 1.5 G/DL
ALP SERPL-CCNC: 104 U/L (ref 30–99)
ALT SERPL-CCNC: 18 U/L (ref 2–50)
ANION GAP SERPL CALC-SCNC: 12 MMOL/L (ref 7–16)
AST SERPL-CCNC: 23 U/L (ref 12–45)
BASOPHILS # BLD AUTO: 1.2 % (ref 0–1.8)
BASOPHILS # BLD: 0.04 K/UL (ref 0–0.12)
BILIRUB SERPL-MCNC: 0.5 MG/DL (ref 0.1–1.5)
BUN SERPL-MCNC: 15 MG/DL (ref 8–22)
CALCIUM ALBUM COR SERPL-MCNC: 9.1 MG/DL (ref 8.5–10.5)
CALCIUM SERPL-MCNC: 9.1 MG/DL (ref 8.5–10.5)
CHLORIDE SERPL-SCNC: 105 MMOL/L (ref 96–112)
CHOLEST SERPL-MCNC: 179 MG/DL (ref 100–199)
CO2 SERPL-SCNC: 23 MMOL/L (ref 20–33)
CREAT SERPL-MCNC: 0.76 MG/DL (ref 0.5–1.4)
EOSINOPHIL # BLD AUTO: 0.09 K/UL (ref 0–0.51)
EOSINOPHIL NFR BLD: 2.7 % (ref 0–6.9)
ERYTHROCYTE [DISTWIDTH] IN BLOOD BY AUTOMATED COUNT: 41.4 FL (ref 35.9–50)
GFR SERPLBLD CREATININE-BSD FMLA CKD-EPI: 94 ML/MIN/1.73 M 2
GLOBULIN SER CALC-MCNC: 2.7 G/DL (ref 1.9–3.5)
GLUCOSE SERPL-MCNC: 87 MG/DL (ref 65–99)
HCT VFR BLD AUTO: 45.9 % (ref 37–47)
HDLC SERPL-MCNC: 69 MG/DL
HGB BLD-MCNC: 15.4 G/DL (ref 12–16)
IMM GRANULOCYTES # BLD AUTO: 0.01 K/UL (ref 0–0.11)
IMM GRANULOCYTES NFR BLD AUTO: 0.3 % (ref 0–0.9)
LDLC SERPL CALC-MCNC: 97 MG/DL
LYMPHOCYTES # BLD AUTO: 1.24 K/UL (ref 1–4.8)
LYMPHOCYTES NFR BLD: 36.7 % (ref 22–41)
MCH RBC QN AUTO: 29.5 PG (ref 27–33)
MCHC RBC AUTO-ENTMCNC: 33.6 G/DL (ref 33.6–35)
MCV RBC AUTO: 87.9 FL (ref 81.4–97.8)
MONOCYTES # BLD AUTO: 0.36 K/UL (ref 0–0.85)
MONOCYTES NFR BLD AUTO: 10.7 % (ref 0–13.4)
NEUTROPHILS # BLD AUTO: 1.64 K/UL (ref 2–7.15)
NEUTROPHILS NFR BLD: 48.4 % (ref 44–72)
NRBC # BLD AUTO: 0 K/UL
NRBC BLD-RTO: 0 /100 WBC
PLATELET # BLD AUTO: 197 K/UL (ref 164–446)
PMV BLD AUTO: 11.4 FL (ref 9–12.9)
POTASSIUM SERPL-SCNC: 3.9 MMOL/L (ref 3.6–5.5)
PROT SERPL-MCNC: 6.7 G/DL (ref 6–8.2)
RBC # BLD AUTO: 5.22 M/UL (ref 4.2–5.4)
SODIUM SERPL-SCNC: 140 MMOL/L (ref 135–145)
T4 FREE SERPL-MCNC: 1.37 NG/DL (ref 0.93–1.7)
TRIGL SERPL-MCNC: 63 MG/DL (ref 0–149)
TSH SERPL DL<=0.005 MIU/L-ACNC: 0.14 UIU/ML (ref 0.38–5.33)
WBC # BLD AUTO: 3.4 K/UL (ref 4.8–10.8)

## 2023-04-21 PROCEDURE — 85025 COMPLETE CBC W/AUTO DIFF WBC: CPT

## 2023-04-21 PROCEDURE — 36415 COLL VENOUS BLD VENIPUNCTURE: CPT

## 2023-04-21 PROCEDURE — 80061 LIPID PANEL: CPT

## 2023-04-21 PROCEDURE — 84443 ASSAY THYROID STIM HORMONE: CPT

## 2023-04-21 PROCEDURE — 80053 COMPREHEN METABOLIC PANEL: CPT

## 2023-04-21 PROCEDURE — 84439 ASSAY OF FREE THYROXINE: CPT

## 2023-06-23 ENCOUNTER — HOSPITAL ENCOUNTER (OUTPATIENT)
Dept: LAB | Facility: MEDICAL CENTER | Age: 53
End: 2023-06-23
Attending: SPECIALIST
Payer: COMMERCIAL

## 2023-06-23 ENCOUNTER — HOSPITAL ENCOUNTER (OUTPATIENT)
Dept: LAB | Facility: MEDICAL CENTER | Age: 53
End: 2023-06-23
Attending: INTERNAL MEDICINE
Payer: COMMERCIAL

## 2023-06-23 ENCOUNTER — HOSPITAL ENCOUNTER (OUTPATIENT)
Dept: LAB | Facility: MEDICAL CENTER | Age: 53
End: 2023-06-23
Attending: NURSE PRACTITIONER
Payer: COMMERCIAL

## 2023-06-23 LAB
ALBUMIN SERPL BCP-MCNC: 4.3 G/DL (ref 3.2–4.9)
ALBUMIN/GLOB SERPL: 2 G/DL
ALP SERPL-CCNC: 99 U/L (ref 30–99)
ALT SERPL-CCNC: 19 U/L (ref 2–50)
ANION GAP SERPL CALC-SCNC: 10 MMOL/L (ref 7–16)
AST SERPL-CCNC: 24 U/L (ref 12–45)
BASOPHILS # BLD AUTO: 0.6 % (ref 0–1.8)
BASOPHILS # BLD: 0.03 K/UL (ref 0–0.12)
BILIRUB SERPL-MCNC: 0.4 MG/DL (ref 0.1–1.5)
BUN SERPL-MCNC: 14 MG/DL (ref 8–22)
CALCIUM ALBUM COR SERPL-MCNC: 9 MG/DL (ref 8.5–10.5)
CALCIUM SERPL-MCNC: 9.2 MG/DL (ref 8.5–10.5)
CHLORIDE SERPL-SCNC: 107 MMOL/L (ref 96–112)
CO2 SERPL-SCNC: 25 MMOL/L (ref 20–33)
CREAT SERPL-MCNC: 0.76 MG/DL (ref 0.5–1.4)
EOSINOPHIL # BLD AUTO: 0.17 K/UL (ref 0–0.51)
EOSINOPHIL NFR BLD: 3.7 % (ref 0–6.9)
ERYTHROCYTE [DISTWIDTH] IN BLOOD BY AUTOMATED COUNT: 43.8 FL (ref 35.9–50)
GFR SERPLBLD CREATININE-BSD FMLA CKD-EPI: 94 ML/MIN/1.73 M 2
GLOBULIN SER CALC-MCNC: 2.2 G/DL (ref 1.9–3.5)
GLUCOSE SERPL-MCNC: 83 MG/DL (ref 65–99)
HCT VFR BLD AUTO: 46.3 % (ref 37–47)
HGB BLD-MCNC: 14.7 G/DL (ref 12–16)
IMM GRANULOCYTES # BLD AUTO: 0 K/UL (ref 0–0.11)
IMM GRANULOCYTES NFR BLD AUTO: 0 % (ref 0–0.9)
LYMPHOCYTES # BLD AUTO: 1.34 K/UL (ref 1–4.8)
LYMPHOCYTES NFR BLD: 28.9 % (ref 22–41)
MCH RBC QN AUTO: 29 PG (ref 27–33)
MCHC RBC AUTO-ENTMCNC: 31.7 G/DL (ref 32.2–35.5)
MCV RBC AUTO: 91.3 FL (ref 81.4–97.8)
MONOCYTES # BLD AUTO: 0.46 K/UL (ref 0–0.85)
MONOCYTES NFR BLD AUTO: 9.9 % (ref 0–13.4)
NEUTROPHILS # BLD AUTO: 2.64 K/UL (ref 1.82–7.42)
NEUTROPHILS NFR BLD: 56.9 % (ref 44–72)
NRBC # BLD AUTO: 0 K/UL
NRBC BLD-RTO: 0 /100 WBC (ref 0–0.2)
PLATELET # BLD AUTO: 211 K/UL (ref 164–446)
PMV BLD AUTO: 11.1 FL (ref 9–12.9)
POTASSIUM SERPL-SCNC: 4 MMOL/L (ref 3.6–5.5)
PROT SERPL-MCNC: 6.5 G/DL (ref 6–8.2)
RBC # BLD AUTO: 5.07 M/UL (ref 4.2–5.4)
SODIUM SERPL-SCNC: 142 MMOL/L (ref 135–145)
WBC # BLD AUTO: 4.6 K/UL (ref 4.8–10.8)

## 2023-06-23 PROCEDURE — 80053 COMPREHEN METABOLIC PANEL: CPT

## 2023-06-23 PROCEDURE — 82607 VITAMIN B-12: CPT

## 2023-06-23 PROCEDURE — 86038 ANTINUCLEAR ANTIBODIES: CPT

## 2023-06-23 PROCEDURE — 84439 ASSAY OF FREE THYROXINE: CPT

## 2023-06-23 PROCEDURE — 80074 ACUTE HEPATITIS PANEL: CPT

## 2023-06-23 PROCEDURE — 36415 COLL VENOUS BLD VENIPUNCTURE: CPT

## 2023-06-23 PROCEDURE — 85025 COMPLETE CBC W/AUTO DIFF WBC: CPT

## 2023-06-23 PROCEDURE — 84443 ASSAY THYROID STIM HORMONE: CPT

## 2023-06-23 PROCEDURE — 87389 HIV-1 AG W/HIV-1&-2 AB AG IA: CPT

## 2023-06-24 LAB
HAV IGM SERPL QL IA: NORMAL
HBV CORE IGM SER QL: NORMAL
HBV SURFACE AG SER QL: NORMAL
HCV AB SER QL: NORMAL
HIV 1+2 AB+HIV1 P24 AG SERPL QL IA: NORMAL
T4 FREE SERPL-MCNC: 1.38 NG/DL (ref 0.93–1.7)
TSH SERPL DL<=0.005 MIU/L-ACNC: 0.2 UIU/ML (ref 0.38–5.33)
VIT B12 SERPL-MCNC: 361 PG/ML (ref 211–911)

## 2023-06-26 LAB — NUCLEAR IGG SER QL IA: NORMAL

## 2023-07-28 ENCOUNTER — HOSPITAL ENCOUNTER (OUTPATIENT)
Dept: LAB | Facility: MEDICAL CENTER | Age: 53
End: 2023-07-28
Attending: NURSE PRACTITIONER
Payer: COMMERCIAL

## 2023-07-28 LAB
ALBUMIN SERPL BCP-MCNC: 3.9 G/DL (ref 3.2–4.9)
ALBUMIN/GLOB SERPL: 1.5 G/DL
ALP SERPL-CCNC: 101 U/L (ref 30–99)
ALT SERPL-CCNC: 17 U/L (ref 2–50)
ANION GAP SERPL CALC-SCNC: 6 MMOL/L (ref 7–16)
AST SERPL-CCNC: 21 U/L (ref 12–45)
BASOPHILS # BLD AUTO: 0.8 % (ref 0–1.8)
BASOPHILS # BLD: 0.03 K/UL (ref 0–0.12)
BILIRUB SERPL-MCNC: 0.4 MG/DL (ref 0.1–1.5)
BUN SERPL-MCNC: 13 MG/DL (ref 8–22)
CALCIUM ALBUM COR SERPL-MCNC: 8.9 MG/DL (ref 8.5–10.5)
CALCIUM SERPL-MCNC: 8.8 MG/DL (ref 8.5–10.5)
CHLORIDE SERPL-SCNC: 106 MMOL/L (ref 96–112)
CHOLEST SERPL-MCNC: 187 MG/DL (ref 100–199)
CO2 SERPL-SCNC: 29 MMOL/L (ref 20–33)
CREAT SERPL-MCNC: 0.8 MG/DL (ref 0.5–1.4)
EOSINOPHIL # BLD AUTO: 0.38 K/UL (ref 0–0.51)
EOSINOPHIL NFR BLD: 10.1 % (ref 0–6.9)
ERYTHROCYTE [DISTWIDTH] IN BLOOD BY AUTOMATED COUNT: 43 FL (ref 35.9–50)
FASTING STATUS PATIENT QL REPORTED: NORMAL
GFR SERPLBLD CREATININE-BSD FMLA CKD-EPI: 88 ML/MIN/1.73 M 2
GLOBULIN SER CALC-MCNC: 2.6 G/DL (ref 1.9–3.5)
GLUCOSE SERPL-MCNC: 87 MG/DL (ref 65–99)
HCT VFR BLD AUTO: 44.6 % (ref 37–47)
HDLC SERPL-MCNC: 67 MG/DL
HGB BLD-MCNC: 14.8 G/DL (ref 12–16)
IMM GRANULOCYTES # BLD AUTO: 0.01 K/UL (ref 0–0.11)
IMM GRANULOCYTES NFR BLD AUTO: 0.3 % (ref 0–0.9)
LDLC SERPL CALC-MCNC: 107 MG/DL
LYMPHOCYTES # BLD AUTO: 1.32 K/UL (ref 1–4.8)
LYMPHOCYTES NFR BLD: 34.9 % (ref 22–41)
MCH RBC QN AUTO: 29.7 PG (ref 27–33)
MCHC RBC AUTO-ENTMCNC: 33.2 G/DL (ref 32.2–35.5)
MCV RBC AUTO: 89.6 FL (ref 81.4–97.8)
MONOCYTES # BLD AUTO: 0.31 K/UL (ref 0–0.85)
MONOCYTES NFR BLD AUTO: 8.2 % (ref 0–13.4)
NEUTROPHILS # BLD AUTO: 1.73 K/UL (ref 1.82–7.42)
NEUTROPHILS NFR BLD: 45.7 % (ref 44–72)
NRBC # BLD AUTO: 0 K/UL
NRBC BLD-RTO: 0 /100 WBC (ref 0–0.2)
PLATELET # BLD AUTO: 212 K/UL (ref 164–446)
PMV BLD AUTO: 11.4 FL (ref 9–12.9)
POTASSIUM SERPL-SCNC: 3.8 MMOL/L (ref 3.6–5.5)
PROT SERPL-MCNC: 6.5 G/DL (ref 6–8.2)
RBC # BLD AUTO: 4.98 M/UL (ref 4.2–5.4)
SODIUM SERPL-SCNC: 141 MMOL/L (ref 135–145)
T4 FREE SERPL-MCNC: 1.02 NG/DL (ref 0.93–1.7)
TRIGL SERPL-MCNC: 64 MG/DL (ref 0–149)
TSH SERPL DL<=0.005 MIU/L-ACNC: 1.62 UIU/ML (ref 0.38–5.33)
WBC # BLD AUTO: 3.8 K/UL (ref 4.8–10.8)

## 2023-07-28 PROCEDURE — 85025 COMPLETE CBC W/AUTO DIFF WBC: CPT

## 2023-07-28 PROCEDURE — 84443 ASSAY THYROID STIM HORMONE: CPT

## 2023-07-28 PROCEDURE — 80061 LIPID PANEL: CPT

## 2023-07-28 PROCEDURE — 84439 ASSAY OF FREE THYROXINE: CPT

## 2023-07-28 PROCEDURE — 36415 COLL VENOUS BLD VENIPUNCTURE: CPT

## 2023-07-28 PROCEDURE — 80053 COMPREHEN METABOLIC PANEL: CPT

## 2023-12-07 ENCOUNTER — HOSPITAL ENCOUNTER (OUTPATIENT)
Dept: LAB | Facility: MEDICAL CENTER | Age: 53
End: 2023-12-07
Attending: NURSE PRACTITIONER
Payer: COMMERCIAL

## 2023-12-07 LAB
ALBUMIN SERPL BCP-MCNC: 3.9 G/DL (ref 3.2–4.9)
ALBUMIN/GLOB SERPL: 1.4 G/DL
ALP SERPL-CCNC: 100 U/L (ref 30–99)
ALT SERPL-CCNC: 16 U/L (ref 2–50)
ANION GAP SERPL CALC-SCNC: 7 MMOL/L (ref 7–16)
AST SERPL-CCNC: 16 U/L (ref 12–45)
BASOPHILS # BLD AUTO: 0.8 % (ref 0–1.8)
BASOPHILS # BLD: 0.03 K/UL (ref 0–0.12)
BILIRUB SERPL-MCNC: 0.4 MG/DL (ref 0.1–1.5)
BUN SERPL-MCNC: 14 MG/DL (ref 8–22)
CALCIUM ALBUM COR SERPL-MCNC: 9.1 MG/DL (ref 8.5–10.5)
CALCIUM SERPL-MCNC: 9 MG/DL (ref 8.5–10.5)
CHLORIDE SERPL-SCNC: 107 MMOL/L (ref 96–112)
CHOLEST SERPL-MCNC: 164 MG/DL (ref 100–199)
CO2 SERPL-SCNC: 26 MMOL/L (ref 20–33)
CREAT SERPL-MCNC: 0.82 MG/DL (ref 0.5–1.4)
EOSINOPHIL # BLD AUTO: 0.14 K/UL (ref 0–0.51)
EOSINOPHIL NFR BLD: 3.7 % (ref 0–6.9)
ERYTHROCYTE [DISTWIDTH] IN BLOOD BY AUTOMATED COUNT: 42.8 FL (ref 35.9–50)
FASTING STATUS PATIENT QL REPORTED: NORMAL
GFR SERPLBLD CREATININE-BSD FMLA CKD-EPI: 86 ML/MIN/1.73 M 2
GLOBULIN SER CALC-MCNC: 2.8 G/DL (ref 1.9–3.5)
GLUCOSE SERPL-MCNC: 87 MG/DL (ref 65–99)
HCT VFR BLD AUTO: 46.2 % (ref 37–47)
HDLC SERPL-MCNC: 62 MG/DL
HGB BLD-MCNC: 15.2 G/DL (ref 12–16)
IMM GRANULOCYTES # BLD AUTO: 0 K/UL (ref 0–0.11)
IMM GRANULOCYTES NFR BLD AUTO: 0 % (ref 0–0.9)
LDLC SERPL CALC-MCNC: 89 MG/DL
LYMPHOCYTES # BLD AUTO: 1.31 K/UL (ref 1–4.8)
LYMPHOCYTES NFR BLD: 34.2 % (ref 22–41)
MCH RBC QN AUTO: 29.4 PG (ref 27–33)
MCHC RBC AUTO-ENTMCNC: 32.9 G/DL (ref 32.2–35.5)
MCV RBC AUTO: 89.4 FL (ref 81.4–97.8)
MONOCYTES # BLD AUTO: 0.33 K/UL (ref 0–0.85)
MONOCYTES NFR BLD AUTO: 8.6 % (ref 0–13.4)
NEUTROPHILS # BLD AUTO: 2.02 K/UL (ref 1.82–7.42)
NEUTROPHILS NFR BLD: 52.7 % (ref 44–72)
NRBC # BLD AUTO: 0 K/UL
NRBC BLD-RTO: 0 /100 WBC (ref 0–0.2)
PLATELET # BLD AUTO: 213 K/UL (ref 164–446)
PMV BLD AUTO: 11.4 FL (ref 9–12.9)
POTASSIUM SERPL-SCNC: 4.1 MMOL/L (ref 3.6–5.5)
PROT SERPL-MCNC: 6.7 G/DL (ref 6–8.2)
RBC # BLD AUTO: 5.17 M/UL (ref 4.2–5.4)
SODIUM SERPL-SCNC: 140 MMOL/L (ref 135–145)
T4 FREE SERPL-MCNC: 1.39 NG/DL (ref 0.93–1.7)
TRIGL SERPL-MCNC: 65 MG/DL (ref 0–149)
TSH SERPL DL<=0.005 MIU/L-ACNC: 1.31 UIU/ML (ref 0.38–5.33)
WBC # BLD AUTO: 3.8 K/UL (ref 4.8–10.8)

## 2023-12-07 PROCEDURE — 84443 ASSAY THYROID STIM HORMONE: CPT

## 2023-12-07 PROCEDURE — 36415 COLL VENOUS BLD VENIPUNCTURE: CPT

## 2023-12-07 PROCEDURE — 84439 ASSAY OF FREE THYROXINE: CPT

## 2023-12-07 PROCEDURE — 80061 LIPID PANEL: CPT

## 2023-12-07 PROCEDURE — 80053 COMPREHEN METABOLIC PANEL: CPT

## 2023-12-07 PROCEDURE — 85025 COMPLETE CBC W/AUTO DIFF WBC: CPT

## 2024-01-31 NOTE — PROGRESS NOTES
"    [facility] Follow-up Visit    Date of Visit: 2/2/2024     CC:  Follow-up for SABINE management      HPI:  Soraida Fregoso is a very pleasant 52 y.o. year old female never smoker, with a PMHx of SABINE, heterozygous MTHFR mutation [id number], heterozygous MTHFR mutation C677T, insomnia, obesity, asthma, GERD, polycystic ovaries, mixed hyperlipidemia, hypertension, RA  who presented to the [facility] for a regular follow up. Last seen in the office on 2/24/2023 with myself.     Patient presents for compliance.  Patient states that she is having morning headaches, daytime drowsiness, and issues when asleep.  She will also have a dry mouth which water alleviates.  She also skin irritation underneath her mask and uses a barrier cough.  She does have baseline rosacea, which she believes attributes to her redness.  She also states that she has been having to change her tubing out every 1 to 2 months because of a \" leak\" when cleaning her tubing.  She denies any significant drowsiness while driving, snoring, gasping, apneas, palpitations, aerophagia, mask leak.  Patient does bed between 10-12:30 AM.  And wakes up at 8 AM.  She awakens 1-2 times 1 at night and does not have any issues when back to sleep.  She has not nap.     DME provider: [employer] (machine)  Device: ResMed AirSense 11   Settings: AutoCPAP 5-15 CWP  When: 9/2022  Mask: FFM  Chin strap: No     Cleaning regimen: Once a week with CPAP wipes and soap and water    Compliance:  Compliance data reviewed showing 100% usage > 4hours in last 30 days. Average AHI 0.5 events/hour. 95% pressure 8.3 CWP. 95% leaks 0.5 L/min. Patient continues to use and benefit from machine.      Sleep History:  HSS 7/11/2022-      Patient Active Problem List    Diagnosis Date Noted    SABINE on CPAP 02/24/2023    Dry mouth 02/24/2023    Obesity (BMI 30-39.9) 01/24/2020    Primary insomnia 10/07/2018    Rheumatoid arthritis involving multiple sites with positive rheumatoid factor (HCC) " "05/17/2017    Essential hypertension 11/11/2016    Family history of stroke 04/13/2016    Hypothyroidism 08/28/2015    Heterozygous MTHFR mutation [id number] 11/07/2014    Heterozygous MTHFR mutation C677T 11/07/2014    Gluten intolerance 05/16/2014    Mixed hyperlipidemia with apolipoprotein E4 variant 05/16/2014    Vitamin D deficiency disease 05/16/2014    GERD (gastroesophageal reflux disease)     Polycystic ovaries     Dysthymic disorder 11/07/2012    Asthma 11/07/2012    Premature surgical menopause on HRT 11/07/2012    Dry eyes 11/07/2012     Past Medical History:   Diagnosis Date    Allergic rhinitis     Anemia     improved    Anesthesia     N & V    ASTHMA     Chickenpox     Depression     Dizziness     Fainting     Fatigue     GERD (gastroesophageal reflux disease)     Grave's disease 08/01/2010    Heart burn     Heartburn     Hiatus hernia syndrome     Hoarseness, persistent     Hypertension     Hypothyroid     Hypothyroidism     Influenza     Insomnia     Morning headache     Obesity     Overweight(278.02)     Painful joint     Polycystic ovaries     rheumatoid 01/01/2000    rheumatoid    Rheumatoid arthritis (HCC)     Ringing in ears     Sore muscles     Wears glasses     Wheezing     Whooping cough       Past Surgical History:   Procedure Laterality Date    HYSTERECTOMY ROBOTIC  10/06/2010    Performed by ELSY HINES at SURGERY [facility] ORS    CYSTOSCOPY  10/06/2010    Performed by ELSY HINES at SURGERY [facility] ORS    KIMI BY LAPAROSCOPY  01/01/2007    LAP BAND SYSTEM  01/01/2006    CHOLECYSTECTOMY      HYSTERECTOMY LAPAROSCOPY      SLEEVE,ARRON VASO THIGH       Family History   Problem Relation Age of Onset    Stroke Father 41    Heart Disease Father         \"hole\" in heart causing stroke    Arthritis Sister         rheumatoid-severe    Asthma Sister     Alcohol/Drug Brother     Cancer Maternal Grandmother     Sleep Apnea Neg Hx      Social History     Socioeconomic History    " Marital status:      Spouse name: Not on file    Number of children: Not on file    Years of education: Not on file    Highest education level: Not on file   Occupational History    Not on file   Tobacco Use    Smoking status: Never    Smokeless tobacco: Never   Vaping Use    Vaping Use: Never used   Substance and Sexual Activity    Alcohol use: No     Comment: twice a year    Drug use: No    Sexual activity: Yes     Partners: Male   Other Topics Concern    Not on file   Social History Narrative    Not on file     Social Determinants of Health     Financial Resource Strain: Not on file   Food Insecurity: Not on file   Transportation Needs: Not on file   Physical Activity: Not on file   Stress: Not on file   Social Connections: Not on file   Intimate Partner Violence: Not on file   Housing Stability: Not on file     Current Outpatient Medications   Medication Sig Dispense Refill    venlafaxine XR (EFFEXOR XR) 75 MG CAPSULE SR 24 HR TAKE 1 CAPSULE BY MOUTH ONCE DAILY WITH FOOD      losartan (COZAAR) 50 MG Tab Take 1 Tablet by mouth every day. 90 Tablet 3    methylPREDNISolone (MEDROL DOSEPAK) 4 MG Tablet Therapy Pack As directed on the packaging label. 21 Tablet 0    methylPREDNISolone (MEDROL DOSEPAK) 4 MG Tablet Therapy Pack As directed on the packaging label. (Patient not taking: Reported on 2/24/2023) 21 Tablet 0    potassium chloride SA (KDUR) 20 MEQ Tab CR Take 1 Tablet by mouth 1 time a day as needed (take with Furosemide use). 30 Tablet 1    leflunomide (ARAVA) 10 MG Tab Alternates with 20 mg      DIVIGEL 1.25 MG/1.25GM Gel APPLY 1 PACKET TRANSDERMALLY ONCE DAILY      albuterol 108 (90 Base) MCG/ACT Aero Soln inhalation aerosol Inhale 2 Puffs every 6 hours as needed for Shortness of Breath. 8.5 g 12    beclomethasone HFA (QVAR REDIHALER) 80 MCG/ACT inhaler Inhale 2 Puffs 2 times a day. 10.6 g 12    levalbuterol (XOPENEX) 0.63 MG/3ML Nebu Soln Take 3 mL by nebulization every four hours as needed for  Shortness of Breath. 75 mL 6    SYNTHROID 125 MCG Tab Take 1 Tablet by mouth every morning on an empty stomach. 90 Tablet 2    mometasone (NASONEX) 50 MCG/ACT nasal spray Administer 2 Sprays into affected nostril(S) every day. 17 g 6    ORENCIA CLICKJECT 125 MG/ML Solution Auto-injector       COD LIVER OIL PO Take  by mouth.      leflunomide (ARAVA) 20 MG TABS Take 1 Tab by mouth every day. 90 Tab 3    Cholecalciferol (VITAMIN D) 2000 UNITS CAPS Take 1 Cap by mouth every day. 30 Cap     multivitamin (THERAGRAN) per tablet Take 1 Tab by mouth every day.      cyclosporin (RESTASIS) 0.05 % ophthalmic emulsion Place 1 Drop in both eyes 2 times a day. 1 Each 3    benzonatate (TESSALON) 100 MG Cap Take 1 Capsule by mouth 3 times a day as needed for Cough. (Patient not taking: Reported on 2/24/2023) 30 Capsule 2    benzonatate (TESSALON) 200 MG capsule Take 1 Capsule by mouth 3 times a day as needed for Cough. (Patient not taking: Reported on 2/24/2023) 30 Capsule 2    ESTRING 2 MG vaginal ring INSERT 1 RING VAGINALLY AND CHANGE EVERY 90 DAYS      azithromycin (ZITHROMAX) 250 MG Tab TAKE 2 TABLETS BY MOUTH ON DAY 1, AND THEN TAKE 1 TABLET BY MOUTH ONCE A DAY ON DAY 2 THROUGH DAY 5 (Patient not taking: Reported on 2/24/2023) 6 Tablet 0    furosemide (LASIX) 20 MG Tab Take 1 Tablet by mouth 1 time a day as needed (leg edema). 30 Tablet 1    fluticasone-salmeterol (ADVAIR) 100-50 MCG/ACT AEROSOL POWDER, BREATH ACTIVATED Inhale 1 Puff every 12 hours. 60 Each 12    escitalopram (LEXAPRO) 20 MG tablet Take 1 Tablet by mouth every day. (Patient not taking: Reported on 2/24/2023) 90 Tablet 3    terbinafine (LAMISIL) 250 MG Tab TAKE 1 TABLET BY MOUTH EVERY DAY AFTER MEALS (Patient not taking: Reported on 2/24/2023)  0    Evening Primrose Oil 500 MG Cap Take 1 Cap by mouth 2 Times a Day.      Estradiol (EVAMIST) 1.53 MG/SPRAY SOLN Apply 1-3 Sprays to skin as directed every day. (Patient not taking: Reported on 2/2/2024) 1 Bottle 0  "    No current facility-administered medications for this visit.      ALLERGIES: Sulfa drugs and Amoxicillin    ROS:  Constitutional: Denies fever, chills, sweats,  weight loss, fatigue  Cardiovascular: Denies chest pain, tightness, palpitations, swelling in legs/feet  Respiratory: Denies shortness of breath, cough, sputum, wheezing, painful breathing   Sleep: per HPI  Gastrointestinal: Denies  difficulty swallowing, nausea, abdominal pain, diarrhea, constipation, heartburn.  Musculoskeletal: Denies painful joints, sore muscles,       PHYSICAL EXAM:  /82 (BP Location: Left arm, Patient Position: Sitting, BP Cuff Size: Adult)   Pulse 65   Resp 14   Ht 1.702 m (5' 7\")   Wt 106 kg (234 lb)   LMP 10/06/2010   SpO2 95%   BMI 36.65 kg/m²   Appearance: Well-nourished, well-developed, no acute distress  Eyes:  No scleral icterus , EOMI  ENMT: No redness of the oropharynx  Lung auscultation:  No wheezes rhonchi rubs or rales  Cardiac: No murmurs, rubs, or gallops; regular rhythm, normal rate; no edema  Musculoskeletal:  Grossly normal; gait and station normal; digits and nails normal  Skin:  No rashes, petechiae, cyanosis  Neurologic: without focal signs; oriented to person, time, place, and purpose; judgement intact  Psychiatric:  No depression, anxiety, agitation  Mallampati score: Class III    Assessment and Plan:    The medical record was reviewed.    Diagnostic and titration nocturnal polysomnogram's, home sleep apnea tests, continuous nocturnal oximetry results, multiple sleep latency tests, and compliance reports reviewed.    Problem List Items Addressed This Visit       SABINE on CPAP     Sleep Apnea:    The pathophysiology of sleep anea and the increased risk of cardiovascular morbidity from untreated sleep apnea is discussed in detail with the patient.  Urged to avoid supine sleep, weight gain and alcoholic beverages since all of these can worsen sleep apnea. Cautioned against drowsy driving. If feeling " sleepy while driving, pull over for a break/nap, rather than persist on the road, in the interest of own safety and that of others on the road.    Compliance download was reviewed and discussed with the patient.     - Order placed for mask and supplies to [employer]  - OPO on AutoCPAP 5-15 CWP  - Compliance was reinforced  - Recommended the patient against the use of Ozone , such as SoClean  - Clean supplies a couple times a week with dish soap and water and air dry  - Recommended the patient change out supplies as recommended for best mask fit and usage of the machine  - Advised patient to reach out via [employer] if any questions or concerns should arise.   - Equipment replacement schedule:  Mask cushion every month  Nasal pillows 2 times per month  Mask every 6 months  Head gear every 6 months  Tubing every 3 months  Ultra-fine filters 2 times per month  Foam filter every 6 months  Humidifier chamber every 6 months  Chin strap every 6 months    Has been advised to continue the current AutoCPAP, clean equipment frequently, and get new mask and supplies as allowed by insurance and DME. Recommend an earlier appointment, if significant treatment barriers develop.    The risks of untreated sleep apnea were discussed with the patient at length. Patients with sleep apnea are at increased risk of cardiovascular disease including coronary artery disease, systemic arterial hypertension, pulmonary arterial hypertension, cardiac arrythmias, and stroke. The patient was advised to avoid driving a motor vehicle when drowsy.    Positive airway pressure will favorably impact many of the adverse conditions and effects provoked by sleep apnea.         Relevant Orders    Overnight Oximetry    DME Mask and Supplies     Have advised the patient to follow up with the appropriate healthcare practitioners for all other medical problems and issues.    Return in about 1 year (around 2/2/2025), or if symptoms worsen or fail to improve,  for compliance, with Zach.    Please note portions of this record was created using voice recognition software. I have made every reasonable attempt to correct obvious errors, but I expect that there are errors of grammar and possibly content I did not discover before finalizing the note.    Time spent in record review prior to patient arrival, reviewing results, and in face-to-face encounter totaled 19 min.  __________  HIRAL Aleman  Pulmonary & Sleep Medicine  [facility]

## 2024-02-02 ENCOUNTER — OFFICE VISIT (OUTPATIENT)
Dept: SLEEP MEDICINE | Facility: MEDICAL CENTER | Age: 54
End: 2024-02-02
Payer: COMMERCIAL

## 2024-02-02 VITALS
DIASTOLIC BLOOD PRESSURE: 82 MMHG | HEIGHT: 67 IN | BODY MASS INDEX: 36.73 KG/M2 | HEART RATE: 65 BPM | WEIGHT: 234 LBS | RESPIRATION RATE: 14 BRPM | SYSTOLIC BLOOD PRESSURE: 128 MMHG | OXYGEN SATURATION: 95 %

## 2024-02-02 DIAGNOSIS — G47.33 OSA ON CPAP: ICD-10-CM

## 2024-02-02 PROCEDURE — 99213 OFFICE O/P EST LOW 20 MIN: CPT

## 2024-02-02 PROCEDURE — 3074F SYST BP LT 130 MM HG: CPT

## 2024-02-02 PROCEDURE — 99212 OFFICE O/P EST SF 10 MIN: CPT

## 2024-02-02 PROCEDURE — 3079F DIAST BP 80-89 MM HG: CPT

## 2024-02-02 ASSESSMENT — FIBROSIS 4 INDEX: FIB4 SCORE: 0.98

## 2024-02-02 NOTE — ASSESSMENT & PLAN NOTE
Sleep Apnea:    The pathophysiology of sleep anea and the increased risk of cardiovascular morbidity from untreated sleep apnea is discussed in detail with the patient.  Urged to avoid supine sleep, weight gain and alcoholic beverages since all of these can worsen sleep apnea. Cautioned against drowsy driving. If feeling sleepy while driving, pull over for a break/nap, rather than persist on the road, in the interest of own safety and that of others on the road.    Compliance download was reviewed and discussed with the patient.     - Order placed for mask and supplies to [employer]  - OPO on AutoCPAP 5-15 CWP  - Compliance was reinforced  - Recommended the patient against the use of Ozone , such as SoClean  - Clean supplies a couple times a week with dish soap and water and air dry  - Recommended the patient change out supplies as recommended for best mask fit and usage of the machine  - Advised patient to reach out via [employer] if any questions or concerns should arise.   - Equipment replacement schedule:  Mask cushion every month  Nasal pillows 2 times per month  Mask every 6 months  Head gear every 6 months  Tubing every 3 months  Ultra-fine filters 2 times per month  Foam filter every 6 months  Humidifier chamber every 6 months  Chin strap every 6 months    Has been advised to continue the current AutoCPAP, clean equipment frequently, and get new mask and supplies as allowed by insurance and DME. Recommend an earlier appointment, if significant treatment barriers develop.    The risks of untreated sleep apnea were discussed with the patient at length. Patients with sleep apnea are at increased risk of cardiovascular disease including coronary artery disease, systemic arterial hypertension, pulmonary arterial hypertension, cardiac arrythmias, and stroke. The patient was advised to avoid driving a motor vehicle when drowsy.    Positive airway pressure will favorably impact many of the adverse conditions  and effects provoked by sleep apnea.

## 2024-02-09 ENCOUNTER — HOSPITAL ENCOUNTER (OUTPATIENT)
Dept: LAB | Facility: MEDICAL CENTER | Age: 54
End: 2024-02-09
Attending: NURSE PRACTITIONER
Payer: COMMERCIAL

## 2024-02-09 ENCOUNTER — HOSPITAL ENCOUNTER (OUTPATIENT)
Dept: LAB | Facility: MEDICAL CENTER | Age: 54
End: 2024-02-09
Attending: CLINICAL NURSE SPECIALIST
Payer: COMMERCIAL

## 2024-02-09 LAB
25(OH)D3 SERPL-MCNC: 30 NG/ML (ref 30–100)
ALBUMIN SERPL BCP-MCNC: 4 G/DL (ref 3.2–4.9)
ALBUMIN/GLOB SERPL: 1.5 G/DL
ALP SERPL-CCNC: 96 U/L (ref 30–99)
ALT SERPL-CCNC: 12 U/L (ref 2–50)
ANION GAP SERPL CALC-SCNC: 11 MMOL/L (ref 7–16)
AST SERPL-CCNC: 24 U/L (ref 12–45)
BASOPHILS # BLD AUTO: 0.7 % (ref 0–1.8)
BASOPHILS # BLD: 0.03 K/UL (ref 0–0.12)
BILIRUB SERPL-MCNC: 0.5 MG/DL (ref 0.1–1.5)
BUN SERPL-MCNC: 13 MG/DL (ref 8–22)
CALCIUM ALBUM COR SERPL-MCNC: 9 MG/DL (ref 8.5–10.5)
CALCIUM SERPL-MCNC: 9 MG/DL (ref 8.5–10.5)
CHLORIDE SERPL-SCNC: 107 MMOL/L (ref 96–112)
CHOLEST SERPL-MCNC: 168 MG/DL (ref 100–199)
CO2 SERPL-SCNC: 22 MMOL/L (ref 20–33)
CREAT SERPL-MCNC: 0.67 MG/DL (ref 0.5–1.4)
EOSINOPHIL # BLD AUTO: 0.16 K/UL (ref 0–0.51)
EOSINOPHIL NFR BLD: 3.5 % (ref 0–6.9)
ERYTHROCYTE [DISTWIDTH] IN BLOOD BY AUTOMATED COUNT: 42.2 FL (ref 35.9–50)
EST. AVERAGE GLUCOSE BLD GHB EST-MCNC: 94 MG/DL
FERRITIN SERPL-MCNC: 39.9 NG/ML (ref 10–291)
FOLATE SERPL-MCNC: 16.9 NG/ML
GFR SERPLBLD CREATININE-BSD FMLA CKD-EPI: 104 ML/MIN/1.73 M 2
GLOBULIN SER CALC-MCNC: 2.6 G/DL (ref 1.9–3.5)
GLUCOSE SERPL-MCNC: 88 MG/DL (ref 65–99)
HBA1C MFR BLD: 4.9 % (ref 4–5.6)
HCT VFR BLD AUTO: 47 % (ref 37–47)
HDLC SERPL-MCNC: 69 MG/DL
HGB BLD-MCNC: 15.7 G/DL (ref 12–16)
IMM GRANULOCYTES # BLD AUTO: 0 K/UL (ref 0–0.11)
IMM GRANULOCYTES NFR BLD AUTO: 0 % (ref 0–0.9)
IRON SATN MFR SERPL: 52 % (ref 15–55)
IRON SERPL-MCNC: 141 UG/DL (ref 40–170)
LDLC SERPL CALC-MCNC: 85 MG/DL
LYMPHOCYTES # BLD AUTO: 1.37 K/UL (ref 1–4.8)
LYMPHOCYTES NFR BLD: 30.1 % (ref 22–41)
MCH RBC QN AUTO: 29.5 PG (ref 27–33)
MCHC RBC AUTO-ENTMCNC: 33.4 G/DL (ref 32.2–35.5)
MCV RBC AUTO: 88.3 FL (ref 81.4–97.8)
MONOCYTES # BLD AUTO: 0.35 K/UL (ref 0–0.85)
MONOCYTES NFR BLD AUTO: 7.7 % (ref 0–13.4)
NEUTROPHILS # BLD AUTO: 2.64 K/UL (ref 1.82–7.42)
NEUTROPHILS NFR BLD: 58 % (ref 44–72)
NRBC # BLD AUTO: 0 K/UL
NRBC BLD-RTO: 0 /100 WBC (ref 0–0.2)
PLATELET # BLD AUTO: 207 K/UL (ref 164–446)
PMV BLD AUTO: 11.7 FL (ref 9–12.9)
POTASSIUM SERPL-SCNC: 3.8 MMOL/L (ref 3.6–5.5)
PROT SERPL-MCNC: 6.6 G/DL (ref 6–8.2)
RBC # BLD AUTO: 5.32 M/UL (ref 4.2–5.4)
SODIUM SERPL-SCNC: 140 MMOL/L (ref 135–145)
T4 FREE SERPL-MCNC: 1.12 NG/DL (ref 0.93–1.7)
TIBC SERPL-MCNC: 269 UG/DL (ref 250–450)
TRIGL SERPL-MCNC: 69 MG/DL (ref 0–149)
TSH SERPL DL<=0.005 MIU/L-ACNC: 1.15 UIU/ML (ref 0.38–5.33)
UIBC SERPL-MCNC: 128 UG/DL (ref 110–370)
VIT B12 SERPL-MCNC: 2119 PG/ML (ref 211–911)
WBC # BLD AUTO: 4.6 K/UL (ref 4.8–10.8)

## 2024-02-09 PROCEDURE — 83036 HEMOGLOBIN GLYCOSYLATED A1C: CPT

## 2024-02-09 PROCEDURE — 85025 COMPLETE CBC W/AUTO DIFF WBC: CPT

## 2024-02-09 PROCEDURE — 82607 VITAMIN B-12: CPT

## 2024-02-09 PROCEDURE — 83013 H PYLORI (C-13) BREATH: CPT

## 2024-02-09 PROCEDURE — 83540 ASSAY OF IRON: CPT

## 2024-02-09 PROCEDURE — 83550 IRON BINDING TEST: CPT

## 2024-02-09 PROCEDURE — 80061 LIPID PANEL: CPT

## 2024-02-09 PROCEDURE — 36415 COLL VENOUS BLD VENIPUNCTURE: CPT

## 2024-02-09 PROCEDURE — 80053 COMPREHEN METABOLIC PANEL: CPT

## 2024-02-09 PROCEDURE — 84443 ASSAY THYROID STIM HORMONE: CPT

## 2024-02-09 PROCEDURE — 84425 ASSAY OF VITAMIN B-1: CPT

## 2024-02-09 PROCEDURE — 82306 VITAMIN D 25 HYDROXY: CPT

## 2024-02-09 PROCEDURE — 82728 ASSAY OF FERRITIN: CPT

## 2024-02-09 PROCEDURE — 84439 ASSAY OF FREE THYROXINE: CPT

## 2024-02-09 PROCEDURE — 82746 ASSAY OF FOLIC ACID SERUM: CPT

## 2024-02-12 LAB — UREA BREATH TEST QL: NEGATIVE

## 2024-02-14 ENCOUNTER — HOSPITAL ENCOUNTER (OUTPATIENT)
Dept: RADIOLOGY | Facility: MEDICAL CENTER | Age: 54
End: 2024-02-14
Attending: SPECIALIST
Payer: COMMERCIAL

## 2024-02-14 DIAGNOSIS — N95.1 MENOPAUSAL AND FEMALE CLIMACTERIC STATES: ICD-10-CM

## 2024-02-14 LAB — VIT B1 BLD-MCNC: 114 NMOL/L (ref 70–180)

## 2024-02-14 PROCEDURE — 77080 DXA BONE DENSITY AXIAL: CPT

## 2024-02-28 ENCOUNTER — PATIENT MESSAGE (OUTPATIENT)
Dept: SLEEP MEDICINE | Facility: MEDICAL CENTER | Age: 54
End: 2024-02-28
Payer: COMMERCIAL

## 2024-03-04 ENCOUNTER — TELEPHONE (OUTPATIENT)
Dept: HEALTH INFORMATION MANAGEMENT | Facility: OTHER | Age: 54
End: 2024-03-04